# Patient Record
Sex: MALE | Race: WHITE | Employment: FULL TIME | URBAN - METROPOLITAN AREA
[De-identification: names, ages, dates, MRNs, and addresses within clinical notes are randomized per-mention and may not be internally consistent; named-entity substitution may affect disease eponyms.]

---

## 2017-01-05 ENCOUNTER — HOSPITAL ENCOUNTER (EMERGENCY)
Age: 47
Discharge: HOME | End: 2017-01-05
Attending: EMERGENCY MEDICINE
Payer: COMMERCIAL

## 2017-01-05 VITALS
WEIGHT: 245.37 LBS | OXYGEN SATURATION: 99 % | SYSTOLIC BLOOD PRESSURE: 114 MMHG | TEMPERATURE: 97.5 F | DIASTOLIC BLOOD PRESSURE: 78 MMHG | RESPIRATION RATE: 16 BRPM | HEART RATE: 86 BPM | BODY MASS INDEX: 32.52 KG/M2 | HEIGHT: 73 IN

## 2017-01-05 DIAGNOSIS — K52.9 GASTROENTERITIS, ACUTE: Primary | ICD-10-CM

## 2017-01-05 LAB
ALBUMIN SERPL BCP-MCNC: 4 G/DL (ref 3.5–5)
ALBUMIN/GLOB SERPL: 1 {RATIO} (ref 1.1–2.2)
ALP SERPL-CCNC: 68 U/L (ref 45–117)
ALT SERPL-CCNC: 40 U/L (ref 12–78)
ANION GAP BLD CALC-SCNC: 9 MMOL/L (ref 5–15)
APPEARANCE UR: CLEAR
AST SERPL W P-5'-P-CCNC: 27 U/L (ref 15–37)
BACTERIA URNS QL MICRO: NEGATIVE /HPF
BASOPHILS # BLD AUTO: 0 K/UL (ref 0–0.1)
BASOPHILS # BLD: 0 % (ref 0–1)
BILIRUB SERPL-MCNC: 1.1 MG/DL (ref 0.2–1)
BILIRUB UR QL: NEGATIVE
BUN SERPL-MCNC: 16 MG/DL (ref 6–20)
BUN/CREAT SERPL: 14 (ref 12–20)
C DIFF TOX GENS STL QL NAA+PROBE: NEGATIVE
CALCIUM SERPL-MCNC: 8.6 MG/DL (ref 8.5–10.1)
CHLORIDE SERPL-SCNC: 108 MMOL/L (ref 97–108)
CO2 SERPL-SCNC: 22 MMOL/L (ref 21–32)
COLOR UR: ABNORMAL
CREAT SERPL-MCNC: 1.12 MG/DL (ref 0.7–1.3)
EOSINOPHIL # BLD: 0.3 K/UL (ref 0–0.4)
EOSINOPHIL NFR BLD: 4 % (ref 0–7)
EPITH CASTS URNS QL MICRO: ABNORMAL /LPF
ERYTHROCYTE [DISTWIDTH] IN BLOOD BY AUTOMATED COUNT: 14 % (ref 11.5–14.5)
GLOBULIN SER CALC-MCNC: 4 G/DL (ref 2–4)
GLUCOSE SERPL-MCNC: 133 MG/DL (ref 65–100)
GLUCOSE UR STRIP.AUTO-MCNC: >1000 MG/DL
HCT VFR BLD AUTO: 50.8 % (ref 36.6–50.3)
HGB BLD-MCNC: 17.2 G/DL (ref 12.1–17)
HGB UR QL STRIP: NEGATIVE
KETONES UR QL STRIP.AUTO: NEGATIVE MG/DL
LEUKOCYTE ESTERASE UR QL STRIP.AUTO: NEGATIVE
LYMPHOCYTES # BLD AUTO: 25 % (ref 12–49)
LYMPHOCYTES # BLD: 1.8 K/UL (ref 0.8–3.5)
MCH RBC QN AUTO: 28.6 PG (ref 26–34)
MCHC RBC AUTO-ENTMCNC: 33.9 G/DL (ref 30–36.5)
MCV RBC AUTO: 84.4 FL (ref 80–99)
MONOCYTES # BLD: 0.7 K/UL (ref 0–1)
MONOCYTES NFR BLD AUTO: 10 % (ref 5–13)
NEUTS SEG # BLD: 4.4 K/UL (ref 1.8–8)
NEUTS SEG NFR BLD AUTO: 61 % (ref 32–75)
NITRITE UR QL STRIP.AUTO: NEGATIVE
PH UR STRIP: 5.5 [PH] (ref 5–8)
PLATELET # BLD AUTO: 181 K/UL (ref 150–400)
POTASSIUM SERPL-SCNC: 4.5 MMOL/L (ref 3.5–5.1)
PROT SERPL-MCNC: 8 G/DL (ref 6.4–8.2)
PROT UR STRIP-MCNC: NEGATIVE MG/DL
RBC # BLD AUTO: 6.02 M/UL (ref 4.1–5.7)
RBC #/AREA URNS HPF: ABNORMAL /HPF (ref 0–5)
SODIUM SERPL-SCNC: 139 MMOL/L (ref 136–145)
SP GR UR REFRACTOMETRY: 1.02 (ref 1–1.03)
UA: UC IF INDICATED,UAUC: ABNORMAL
UROBILINOGEN UR QL STRIP.AUTO: 0.2 EU/DL (ref 0.2–1)
WBC # BLD AUTO: 7.2 K/UL (ref 4.1–11.1)
WBC #/AREA STL HPF: NORMAL /HPF (ref 0–4)
WBC URNS QL MICRO: ABNORMAL /HPF (ref 0–4)

## 2017-01-05 PROCEDURE — 85025 COMPLETE CBC W/AUTO DIFF WBC: CPT | Performed by: EMERGENCY MEDICINE

## 2017-01-05 PROCEDURE — 96360 HYDRATION IV INFUSION INIT: CPT

## 2017-01-05 PROCEDURE — 89055 LEUKOCYTE ASSESSMENT FECAL: CPT | Performed by: PHYSICIAN ASSISTANT

## 2017-01-05 PROCEDURE — 87493 C DIFF AMPLIFIED PROBE: CPT | Performed by: PHYSICIAN ASSISTANT

## 2017-01-05 PROCEDURE — 81001 URINALYSIS AUTO W/SCOPE: CPT | Performed by: EMERGENCY MEDICINE

## 2017-01-05 PROCEDURE — 80053 COMPREHEN METABOLIC PANEL: CPT | Performed by: EMERGENCY MEDICINE

## 2017-01-05 PROCEDURE — 74011250636 HC RX REV CODE- 250/636: Performed by: PHYSICIAN ASSISTANT

## 2017-01-05 PROCEDURE — 99283 EMERGENCY DEPT VISIT LOW MDM: CPT

## 2017-01-05 PROCEDURE — 87427 SHIGA-LIKE TOXIN AG IA: CPT | Performed by: PHYSICIAN ASSISTANT

## 2017-01-05 PROCEDURE — 96361 HYDRATE IV INFUSION ADD-ON: CPT

## 2017-01-05 PROCEDURE — 36415 COLL VENOUS BLD VENIPUNCTURE: CPT | Performed by: EMERGENCY MEDICINE

## 2017-01-05 RX ORDER — METFORMIN HYDROCHLORIDE EXTENDED-RELEASE TABLETS 1000 MG/1
2000 TABLET, FILM COATED, EXTENDED RELEASE ORAL DAILY
COMMUNITY

## 2017-01-05 RX ORDER — DIPHENOXYLATE HYDROCHLORIDE AND ATROPINE SULFATE 2.5; .025 MG/1; MG/1
1 TABLET ORAL
Qty: 20 TAB | Refills: 0 | Status: SHIPPED | OUTPATIENT
Start: 2017-01-05 | End: 2017-10-26

## 2017-01-05 RX ORDER — LISINOPRIL 20 MG/1
20 TABLET ORAL DAILY
COMMUNITY
End: 2017-11-14 | Stop reason: SINTOL

## 2017-01-05 RX ORDER — ONDANSETRON 4 MG/1
4 TABLET, FILM COATED ORAL
Qty: 20 TAB | Refills: 0 | Status: SHIPPED | OUTPATIENT
Start: 2017-01-05 | End: 2017-10-26

## 2017-01-05 RX ORDER — SODIUM CHLORIDE 0.9 % (FLUSH) 0.9 %
5-10 SYRINGE (ML) INJECTION EVERY 8 HOURS
Status: DISCONTINUED | OUTPATIENT
Start: 2017-01-05 | End: 2017-01-05 | Stop reason: HOSPADM

## 2017-01-05 RX ORDER — SODIUM CHLORIDE 0.9 % (FLUSH) 0.9 %
5-10 SYRINGE (ML) INJECTION AS NEEDED
Status: DISCONTINUED | OUTPATIENT
Start: 2017-01-05 | End: 2017-01-05 | Stop reason: HOSPADM

## 2017-01-05 RX ORDER — ERGOCALCIFEROL 1.25 MG/1
50000 CAPSULE ORAL
COMMUNITY
End: 2018-09-04 | Stop reason: ALTCHOICE

## 2017-01-05 RX ORDER — ATORVASTATIN CALCIUM 40 MG/1
40 TABLET, FILM COATED ORAL DAILY
COMMUNITY

## 2017-01-05 RX ORDER — PANTOPRAZOLE SODIUM 40 MG/1
40 TABLET, DELAYED RELEASE ORAL DAILY
COMMUNITY
End: 2018-09-04

## 2017-01-05 RX ADMIN — SODIUM CHLORIDE 1000 ML: 900 INJECTION, SOLUTION INTRAVENOUS at 10:40

## 2017-01-05 RX ADMIN — SODIUM CHLORIDE 1000 ML: 900 INJECTION, SOLUTION INTRAVENOUS at 12:29

## 2017-01-05 NOTE — ED NOTES
RICCI Pagan reviewed discharge instructions with the patient. The patient verbalized understanding. Patient discharged home in stable condition, ambulatory with family. Patient discharged with discharge papers and prescriptions in hand. Patient awake and alert, stable gait.

## 2017-01-05 NOTE — LETTER
[address] 
[facility] EMERGENCY DEPT 
[address] [place] [address] [address] [id number] [phone] Work/School Note Date: 1/5/2017 To Whom It May concern: 
 
Yamileth Ignacio was seen and treated today in the emergency room by the following provider(s): 
Attending Provider: Dennis Werner MD 
Physician Assistant: RICCI Cervantes. Please excuse Yamileth Ignacio from work for the next 2 days. Sincerely, Lino Cervantes

## 2017-01-05 NOTE — DISCHARGE INSTRUCTIONS
Gastroenteritis: Care Instructions  Your Care Instructions  Gastroenteritis is an illness that may cause nausea, vomiting, and diarrhea. It is sometimes called \"stomach flu. \" It can be caused by bacteria or a virus. You will probably begin to feel better in 1 to 2 days. In the meantime, get plenty of rest and make sure you do not become dehydrated. Dehydration occurs when your body loses too much fluid. Follow-up care is a key part of your treatment and safety. Be sure to make and go to all appointments, and call your doctor if you are having problems. Its also a good idea to know your test results and keep a list of the medicines you take. How can you care for yourself at home? · If your doctor prescribed antibiotics, take them as directed. Do not stop taking them just because you feel better. You need to take the full course of antibiotics. · Drink plenty of fluids to prevent dehydration, enough so that your urine is light yellow or clear like water. Choose water and other caffeine-free clear liquids until you feel better. If you have kidney, heart, or liver disease and have to limit fluids, talk with your doctor before you increase your fluid intake. · Drink fluids slowly, in frequent, small amounts, because drinking too much too fast can cause vomiting. · Begin eating mild foods, such as dry toast, yogurt, applesauce, bananas, and rice. Avoid spicy, hot, or high-fat foods, and do not drink alcohol or caffeine for a day or two. Do not drink milk or eat ice cream until you are feeling better. How to prevent gastroenteritis  · Keep hot foods hot and cold foods cold. · Do not eat meats, dressings, salads, or other foods that have been kept at room temperature for more than 2 hours. · Use a thermometer to check your refrigerator. It should be between 34°F and 40°F.  · Defrost meats in the refrigerator or microwave, not on the kitchen counter. · Keep your hands and your kitchen clean.  Wash your hands, cutting boards, and countertops with hot soapy water frequently. · Cook meat until it is well done. · Do not eat raw eggs or uncooked sauces made with raw eggs. · Do not take chances. If food looks or tastes spoiled, throw it out. When should you call for help? Call 911 anytime you think you may need emergency care. For example, call if:  · You vomit blood or what looks like coffee grounds. · You passed out (lost consciousness). · You pass maroon or very bloody stools. Call your doctor now or seek immediate medical care if:  · You have severe belly pain. · You have signs of needing more fluids. You have sunken eyes, a dry mouth, and pass only a little dark urine. · You feel like you are going to faint. · You have increased belly pain that does not go away in 1 to 2 days. · You have new or increased nausea, or you are vomiting. · You have a new or higher fever. · Your stools are black and tarlike or have streaks of blood. Watch closely for changes in your health, and be sure to contact your doctor if:  · You are dizzy or lightheaded. · You urinate less than usual, or your urine is dark yellow or brown. · You do not feel better with each day that goes by. Where can you learn more? Go to http://milind-joe.info/. Enter N142 in the search box to learn more about \"Gastroenteritis: Care Instructions. \"  Current as of: May 24, 2016  Content Version: 11.1  © 6632-6973 [employer]. Care instructions adapted under license by [employer] (which disclaims liability or warranty for this information). If you have questions about a medical condition or this instruction, always ask your healthcare professional. [address] any warranty or liability for your use of this information.

## 2017-01-05 NOTE — ED PROVIDER NOTES
The history is provided by the patient. No  was used. Harriet Leyva is a 48 y.o. male who presents ambulatory to [id number] ED, with PMH of DM and hypercholesterolemia,  c/o diarrhea ongoing x 4 days with x 10 episodes since 12 AM today. Patient has had sensation of weakness and dizziness. Patient experienced x 1 episode of vomiting on 1/2/17, none since or prior to that episode. Patient took immodium at onset without improvement. \"I have not felt bad\" and \"I feel weak and I feel dizzy\". There has been no nausea and no ABD pain. Patient simply continues to have to make trips to bathroom. Patient specifically denies fever/chills, URI s/s, cough, SOB, ABD tenderness, back pain, nausea, rashes, urinary s/s, recently drinking stream water or eating exotic/unusual foods. Since onset of s/s, patient continues to eat and taking PO fluids normally, and food \"seems to partially digest\" before it becomes diarrhea. Diarrhea is \"thin and watery\". Patient's s/s began after he returned home on 1/1/17 0300 from [place]. Patient ate no unusual foods/dishses; patient had two glasses of wine and ate only finger type foods/cheeses. No hx similar s/s. No recent ABX. Patient with no other specific c/o or concerns today. Pain is 0/10 scale. Going to bathroom frequently is \"annoying\". PCP: Che Nye MD   Allergies: dolobid      There are no other complaints, changes or physical findings at this time. Past Medical History:   Diagnosis Date    Diabetes ([address]     Hypercholesterolemia        Past Surgical History:   Procedure Laterality Date    Vascular surgery procedure unlist           History reviewed. No pertinent family history. Social History     Social History    Marital status:      Spouse name: N/A    Number of children: N/A    Years of education: N/A     Occupational History    Not on file.      Social History Main Topics    Smoking status: Former Smoker    Smokeless tobacco: Never Used    Alcohol use Yes      Comment: moderate    Drug use: Not on file    Sexual activity: Not on file     Other Topics Concern    Not on file     Social History Narrative         ALLERGIES: Dolobid [diflunisal]    Review of Systems   Constitutional: Negative for chills and fever. HENT: Negative for ear pain, rhinorrhea and sore throat. Respiratory: Negative for cough and shortness of breath. Gastrointestinal: Positive for diarrhea. Negative for abdominal pain and nausea. Genitourinary: Negative for dysuria and frequency. Musculoskeletal: Negative for back pain. Skin: Negative for rash. Neurological: Positive for dizziness and weakness. All other systems reviewed and are negative. Vitals:    01/05/17 1000 01/05/17 1312   BP: (!) 133/92 114/78   Pulse: 99 86   Resp: 16 16   Temp: 97.5 °F (36.4 °C)    SpO2: 99% 99%   Weight: 111.3 kg (245 lb 6 oz)    Height: 6' 1\" (1.854 m)             Physical Exam   Constitutional: He is oriented to person, place, and time. He appears well-developed and well-nourished. No distress. 47 yo  female   HENT:   Head: Normocephalic and atraumatic. Eyes: Conjunctivae are normal. Right eye exhibits no discharge. Left eye exhibits no discharge. Neck: Normal range of motion. Neck supple. Cardiovascular: Normal rate, regular rhythm and normal heart sounds. No murmur heard. Pulmonary/Chest: Effort normal and breath sounds normal. No respiratory distress. He has no wheezes. Abdominal: Soft. Normal appearance. He exhibits no distension. Bowel sounds are increased. There is no tenderness. There is no rebound and no guarding. Lymphadenopathy:     He has no cervical adenopathy. Neurological: He is alert and oriented to person, place, and time. Skin: Skin is warm and dry. He is not diaphoretic. Psychiatric: He has a normal mood and affect. His behavior is normal.   Nursing note and vitals reviewed.        MDM  Number of Diagnoses or Management Options  Diagnosis management comments: DDx: viral gastroenteritis, dehydration, food poisoning, electrolyte abnormality    Needs work note    ED Course       Procedures     12:08 PM  RICCI Pagan at bedside reviewing available test results and treatment /discharge plan, including a second liter of fluids prior to discharge today. Patient is feeling improved. Patient agrees and verbalizes understanding of plan. DISCHARGE NOTE:  1:18 PM  The care plan has been outline with the patient and/or family, who verbally conveyed understanding and agreement. Available results have been reviewed. Patient and/or family understand the follow up plan as outlined and discharge instructions. Should their condition deterioration at any time after discharge the patient agrees to return, follow up sooner than outlined or seek medical assistance at the closest Emergency Room as soon as possible. Questions have been answered. Discharge instructions and educational information regarding the patient's diagnosis as well a list of reasons why the patient would want to seek immediate medical attention, should their condition change, were reviewed directly with the patient/family     LABS COMPLETED AND REVIEWED:  Recent Results (from the past 12 hour(s))   CBC WITH AUTOMATED DIFF    Collection Time: 01/05/17 10:30 AM   Result Value Ref Range    WBC 7.2 4.1 - 11.1 K/uL    RBC 6.02 (H) 4.10 - 5.70 M/uL    HGB 17.2 (H) 12.1 - 17.0 g/dL    HCT 50.8 (H) 36.6 - 50.3 %    MCV 84.4 80.0 - 99.0 FL    MCH 28.6 26.0 - 34.0 PG    MCHC 33.9 30.0 - 36.5 g/dL    RDW 14.0 11.5 - 14.5 %    PLATELET 398 925 - 869 K/uL    NEUTROPHILS 61 32 - 75 %    LYMPHOCYTES 25 12 - 49 %    MONOCYTES 10 5 - 13 %    EOSINOPHILS 4 0 - 7 %    BASOPHILS 0 0 - 1 %    ABS. NEUTROPHILS 4.4 1.8 - 8.0 K/UL    ABS. LYMPHOCYTES 1.8 0.8 - 3.5 K/UL    ABS. MONOCYTES 0.7 0.0 - 1.0 K/UL    ABS. EOSINOPHILS 0.3 0.0 - 0.4 K/UL    ABS.  BASOPHILS 0.0 0.0 - 0.1 K/UL   METABOLIC PANEL, COMPREHENSIVE    Collection Time: 01/05/17 10:30 AM   Result Value Ref Range    Sodium 139 136 - 145 mmol/L    Potassium 4.5 3.5 - 5.1 mmol/L    Chloride 108 97 - 108 mmol/L    CO2 22 21 - 32 mmol/L    Anion gap 9 5 - 15 mmol/L    Glucose 133 (H) 65 - 100 mg/dL    BUN 16 6 - 20 MG/DL    Creatinine 1.12 0.70 - 1.30 MG/DL    BUN/Creatinine ratio 14 12 - 20      GFR est AA >60 >60 ml/min/1.73m2    GFR est non-AA >60 >60 ml/min/1.73m2    Calcium 8.6 8.5 - 10.1 MG/DL    Bilirubin, total 1.1 (H) 0.2 - 1.0 MG/DL    ALT 40 12 - 78 U/L    AST 27 15 - 37 U/L    Alk. phosphatase 68 45 - 117 U/L    Protein, total 8.0 6.4 - 8.2 g/dL    Albumin 4.0 3.5 - 5.0 g/dL    Globulin 4.0 2.0 - 4.0 g/dL    A-G Ratio 1.0 (L) 1.1 - 2.2     URINALYSIS W/ REFLEX CULTURE    Collection Time: 01/05/17 10:57 AM   Result Value Ref Range    Color YELLOW/STRAW      Appearance CLEAR CLEAR      Specific gravity 1.020 1.003 - 1.030      pH (UA) 5.5 5.0 - 8.0      Protein NEGATIVE  NEG mg/dL    Glucose >1000 (A) NEG mg/dL    Ketone NEGATIVE  NEG mg/dL    Bilirubin NEGATIVE  NEG      Blood NEGATIVE  NEG      Urobilinogen 0.2 0.2 - 1.0 EU/dL    Nitrites NEGATIVE  NEG      Leukocyte Esterase NEGATIVE  NEG      WBC 0-4 0 - 4 /hpf    RBC 0-5 0 - 5 /hpf    Epithelial cells FEW FEW /lpf    Bacteria NEGATIVE  NEG /hpf    UA:UC IF INDICATED CULTURE NOT INDICATED BY UA RESULT CNI           Medications   sodium chloride (NS) flush 5-10 mL (not administered)   sodium chloride (NS) flush 5-10 mL (not administered)   sodium chloride 0.9 % bolus infusion 1,000 mL (1,000 mL IntraVENous New Bag 1/5/17 1229)   sodium chloride 0.9 % bolus infusion 1,000 mL (1,000 mL IntraVENous New Bag 1/5/17 1040)       CLINICAL IMPRESSION:  1. Gastroenteritis, acute        Plan:  1. Garza diet until s/s resolve  2.  Push PO fluids  Current Discharge Medication List      START taking these medications    Details   diphenoxylate-atropine (LOMOTIL) 2.5-0.025 mg per tablet Take 1 Tab by mouth four (4) times daily as needed for Diarrhea. Max Daily Amount: 4 Tabs. Qty: 20 Tab, Refills: 0      ondansetron hcl (ZOFRAN, AS HYDROCHLORIDE,) 4 mg tablet Take 1 Tab by mouth every eight (8) hours as needed for Nausea. Qty: 20 Tab, Refills: 0         CONTINUE these medications which have NOT CHANGED    Details   metFORMIN ER 1,000 mg tr24 Take 2,000 mg by mouth. Liraglutide (VICTOZA 2-KIYA) 0.6 mg/0.1 mL (18 mg/3 mL) sub-q pen 1.8 mg by SubCUTAneous route. canagliflozin (INVOKANA) 300 mg tablet Take  by mouth Daily (before breakfast). lisinopril (PRINIVIL, ZESTRIL) 20 mg tablet Take 20 mg by mouth daily. atorvastatin (LIPITOR) 40 mg tablet Take 40 mg by mouth daily. ergocalciferol (VITAMIN D2) 50,000 unit capsule Take 50,000 Units by mouth. vit B Cmplx 3-FA-Vit C-Biotin (NEPHRO JENARO RX) 1- mg-mg-mcg tablet Take 1 Tab by mouth daily. pantoprazole (PROTONIX) 40 mg tablet Take 40 mg by mouth daily. TESTOSTERONE (AXIRON TD) by TransDERmal route. Follow-up Information     Follow up With Details Comments Valentin Pollard MD In 1 week  [address] [employer]  [phone]          Return to the closest emergency room or follow up sooner for any deterioration  Patient's condition is stable and patient is ready to go home    This note is prepared by Ana Maria Hartman, acting as Scribe for [employer] MENDY Christianson PA-C: The scribe's documentation has been prepared under my direction and personally reviewed by me in its entirety. I confirm that the note above accurately reflects all work, treatment, procedures, and medical decision making performed by me.    4:09 PM  I was personally available for consultation in the emergency department. I have reviewed the chart and agree with the documentation recorded by the [facility], including the assessment, treatment plan, and disposition.   Liz Valenzuela MD

## 2017-01-05 NOTE — ED NOTES
Patient arrived with c/o diarrhea x4 days. Pt still able to eat and drink but reported \"everything goes right through me. \"  Pt denies abdominal pain. Pt reported he is dehydrated. Pt states \"I feel good, I just want to know why I have the diarrhea. \"  Kandis Syed with minimal relief. Pt A&Ox4.

## 2017-01-05 NOTE — LETTER
[facility] EMERGENCY DEPT 
[address] [place] [address] [address] [id number] [phone] Work/School Note Date: 1/5/2017 To Whom It May concern: 
 
Vianca Hart was seen and treated today in the emergency room by the following provider(s): 
Attending Provider: Robert Antony MD 
Physician Assistant: RICCI Dong. Please excuse Vianca Hart from work tomorrow. Sincerely, Lino Dong

## 2017-01-07 LAB
BACTERIA SPEC CULT: NORMAL
C JEJUNI+C COLI AG STL QL: NEGATIVE
E COLI SXT1+2 STL IA: NEGATIVE
SERVICE CMNT-IMP: NORMAL

## 2017-10-26 ENCOUNTER — HOSPITAL ENCOUNTER (EMERGENCY)
Age: 47
Discharge: HOME | End: 2017-10-26
Attending: FAMILY MEDICINE

## 2017-10-26 VITALS
SYSTOLIC BLOOD PRESSURE: 123 MMHG | HEART RATE: 100 BPM | OXYGEN SATURATION: 95 % | TEMPERATURE: 97 F | BODY MASS INDEX: 33.66 KG/M2 | RESPIRATION RATE: 16 BRPM | HEIGHT: 73 IN | DIASTOLIC BLOOD PRESSURE: 77 MMHG | WEIGHT: 254 LBS

## 2017-10-26 DIAGNOSIS — J20.8 ACUTE BRONCHITIS, VIRAL: Primary | ICD-10-CM

## 2017-10-26 RX ORDER — ALBUTEROL SULFATE 90 UG/1
2 AEROSOL, METERED RESPIRATORY (INHALATION)
Qty: 1 INHALER | Refills: 0 | Status: SHIPPED | OUTPATIENT
Start: 2017-10-26 | End: 2018-09-04

## 2017-10-26 RX ORDER — BENZONATATE 200 MG/1
200 CAPSULE ORAL
Qty: 21 CAP | Refills: 0 | Status: SHIPPED | OUTPATIENT
Start: 2017-10-26 | End: 2017-11-02

## 2017-10-26 RX ORDER — CODEINE PHOSPHATE AND GUAIFENESIN 10; 100 MG/5ML; MG/5ML
5 SOLUTION ORAL
Qty: 120 ML | Refills: 0 | Status: SHIPPED | OUTPATIENT
Start: 2017-10-26 | End: 2017-11-14 | Stop reason: ALTCHOICE

## 2017-10-26 RX ORDER — AZITHROMYCIN 250 MG/1
TABLET, FILM COATED ORAL
Qty: 6 TAB | Refills: 0 | Status: SHIPPED | OUTPATIENT
Start: 2017-10-26 | End: 2017-11-14

## 2017-10-26 RX ORDER — IPRATROPIUM BROMIDE AND ALBUTEROL SULFATE 2.5; .5 MG/3ML; MG/3ML
3 SOLUTION RESPIRATORY (INHALATION)
Qty: 30 NEBULE | Refills: 0 | Status: SHIPPED | OUTPATIENT
Start: 2017-10-26 | End: 2017-10-26

## 2017-10-26 NOTE — DISCHARGE INSTRUCTIONS
Bronchitis: Care Instructions  Your Care Instructions    Bronchitis is inflammation of the bronchial tubes, which carry air to the lungs. The tubes swell and produce mucus, or phlegm. The mucus and inflamed bronchial tubes make you cough. You may have trouble breathing. Most cases of bronchitis are caused by viruses like those that cause colds. Antibiotics usually do not help and they may be harmful. Bronchitis usually develops rapidly and lasts about 2 to 3 weeks in otherwise healthy people. Follow-up care is a key part of your treatment and safety. Be sure to make and go to all appointments, and call your doctor if you are having problems. It's also a good idea to know your test results and keep a list of the medicines you take. How can you care for yourself at home? · Take all medicines exactly as prescribed. Call your doctor if you think you are having a problem with your medicine. · Get some extra rest.  · Take an over-the-counter pain medicine, such as acetaminophen (Tylenol), ibuprofen (Advil, Motrin), or naproxen (Aleve) to reduce fever and relieve body aches. Read and follow all instructions on the label. · Do not take two or more pain medicines at the same time unless the doctor told you to. Many pain medicines have acetaminophen, which is Tylenol. Too much acetaminophen (Tylenol) can be harmful. · Take an over-the-counter cough medicine that contains dextromethorphan to help quiet a dry, hacking cough so that you can sleep. Avoid cough medicines that have more than one active ingredient. Read and follow all instructions on the label. · Breathe moist air from a humidifier, hot shower, or sink filled with hot water. The heat and moisture will thin mucus so you can cough it out. · Do not smoke. Smoking can make bronchitis worse. If you need help quitting, talk to your doctor about stop-smoking programs and medicines. These can increase your chances of quitting for good.   When should you call for help? Call 911 anytime you think you may need emergency care. For example, call if:  ? · You have severe trouble breathing. ?Call your doctor now or seek immediate medical care if:  ? · You have new or worse trouble breathing. ? · You cough up dark brown or bloody mucus (sputum). ? · You have a new or higher fever. ? · You have a new rash. ? Watch closely for changes in your health, and be sure to contact your doctor if:  ? · You cough more deeply or more often, especially if you notice more mucus or a change in the color of your mucus. ? · You are not getting better as expected. Where can you learn more? Go to http://milind-joe.info/. Enter H333 in the search box to learn more about \"Bronchitis: Care Instructions. \"  Current as of: May 12, 2017  Content Version: 11.4  © 4783-0862 [employer]. Care instructions adapted under license by [employer] (which disclaims liability or warranty for this information). If you have questions about a medical condition or this instruction, always ask your healthcare professional. [address] any warranty or liability for your use of this information.

## 2017-11-06 NOTE — UC PROVIDER NOTE
Patient is a 50 y.o. male presenting with cough. Cough   This is a new problem. The current episode started more than 1 week ago. The problem occurs every few minutes. The problem has not changed since onset. The cough is non-productive. There has been no fever. Associated symptoms include sore throat. Pertinent negatives include no chest pain, no chills, no ear pain, no headaches, no myalgias, no shortness of breath, no nausea and no vomiting. He has tried nothing for the symptoms. He is not a smoker. His past medical history is significant for bronchitis. His past medical history does not include pneumonia or asthma. Past Medical History:   Diagnosis Date    Diabetes ([address]     Hypercholesterolemia         Past Surgical History:   Procedure Laterality Date    VASCULAR SURGERY PROCEDURE UNLIST           History reviewed. No pertinent family history. Social History     Social History    Marital status:      Spouse name: N/A    Number of children: N/A    Years of education: N/A     Occupational History    Not on file. Social History Main Topics    Smoking status: Former Smoker    Smokeless tobacco: Never Used    Alcohol use Yes      Comment: moderate    Drug use: Not on file    Sexual activity: Not on file     Other Topics Concern    Not on file     Social History Narrative                ALLERGIES: Dolobid [diflunisal]    Review of Systems   Constitutional: Negative for chills. HENT: Positive for sore throat. Negative for ear pain. Respiratory: Positive for cough. Negative for shortness of breath. Cardiovascular: Negative for chest pain. Gastrointestinal: Negative for nausea and vomiting. Musculoskeletal: Negative for myalgias. Neurological: Negative for headaches. All other systems reviewed and are negative.       Vitals:    10/26/17 1320   BP: 123/77   Pulse: 100   Resp: 16   Temp: 97 °F (36.1 °C)   SpO2: 95%   Weight: 115.2 kg (254 lb)   Height: 6' 1\" (1.854 m) Physical Exam   Constitutional: No distress. HENT:   Right Ear: Tympanic membrane and ear canal normal.   Left Ear: Tympanic membrane and ear canal normal.   Nose: Nose normal.   Mouth/Throat: No oropharyngeal exudate, posterior oropharyngeal edema or posterior oropharyngeal erythema. Eyes: Conjunctivae are normal. Right eye exhibits no discharge. Left eye exhibits no discharge. Neck: Neck supple. Pulmonary/Chest: Effort normal and breath sounds normal. No respiratory distress. He has no wheezes. He has no rales. Lymphadenopathy:     He has no cervical adenopathy. Skin: No rash noted. Nursing note and vitals reviewed. MDM     Differential Diagnosis; Clinical Impression; Plan:     CLINICAL IMPRESSION:  Acute bronchitis, viral  (primary encounter diagnosis)      DDX    Plan:      Tessalon- robitussin AC suspension - flonase and albuterol     If not better in 1 week- use z myles   Amount and/or Complexity of Data Reviewed:    Review and summarize past medical records:  Yes  Risk of Significant Complications, Morbidity, and/or Mortality:   Presenting problems: Moderate  Management options:   Moderate  Progress:   Patient progress:  Stable      Procedures

## 2017-11-14 ENCOUNTER — OFFICE VISIT (OUTPATIENT)
Dept: FAMILY MEDICINE CLINIC | Age: 47
End: 2017-11-14

## 2017-11-14 VITALS
HEIGHT: 73 IN | SYSTOLIC BLOOD PRESSURE: 124 MMHG | OXYGEN SATURATION: 96 % | TEMPERATURE: 97.9 F | HEART RATE: 97 BPM | DIASTOLIC BLOOD PRESSURE: 75 MMHG | WEIGHT: 258 LBS | RESPIRATION RATE: 16 BRPM | BODY MASS INDEX: 34.19 KG/M2

## 2017-11-14 DIAGNOSIS — Z76.89 ENCOUNTER TO ESTABLISH CARE: ICD-10-CM

## 2017-11-14 DIAGNOSIS — R09.82 POST-NASAL DRIP: ICD-10-CM

## 2017-11-14 DIAGNOSIS — R05.9 COUGH: Primary | ICD-10-CM

## 2017-11-14 DIAGNOSIS — T46.4X5A ADVERSE EFFECT OF LISINOPRIL, INITIAL ENCOUNTER: ICD-10-CM

## 2017-11-14 DIAGNOSIS — R05.3 PERSISTENT DRY COUGH: ICD-10-CM

## 2017-11-14 DIAGNOSIS — Z79.4 CONTROLLED TYPE 2 DIABETES MELLITUS WITHOUT COMPLICATION, WITH LONG-TERM CURRENT USE OF INSULIN (HCC): ICD-10-CM

## 2017-11-14 DIAGNOSIS — E11.9 CONTROLLED TYPE 2 DIABETES MELLITUS WITHOUT COMPLICATION, WITH LONG-TERM CURRENT USE OF INSULIN (HCC): ICD-10-CM

## 2017-11-14 RX ORDER — BENZONATATE 100 MG/1
100 CAPSULE ORAL
Qty: 21 CAP | Refills: 0 | Status: SHIPPED | OUTPATIENT
Start: 2017-11-14 | End: 2017-11-21

## 2017-11-14 RX ORDER — NAPROXEN 250 MG/1
TABLET ORAL 2 TIMES DAILY WITH MEALS
COMMUNITY
End: 2018-11-28

## 2017-11-14 RX ORDER — CODEINE PHOSPHATE AND GUAIFENESIN 10; 100 MG/5ML; MG/5ML
5 SOLUTION ORAL
Qty: 120 ML | Refills: 0 | Status: SHIPPED | OUTPATIENT
Start: 2017-11-14 | End: 2018-09-04

## 2017-11-14 NOTE — PROGRESS NOTES
Chief Complaint   Patient presents with    Cough     been going on for 1 month     Headache    Nasal Congestion     in the chest      1. Have you been to the ER, urgent care clinic since your last visit? Hospitalized since your last visit?n/a hasn't been to office in 3 years     2. Have you seen or consulted any other health care providers outside of the [address] [place] [address] since your last visit? Include any pap smears or colon screening. Patient sees an endocrinologist. Dr. Torsten Pérez- Internal      Pt sees Dr. Maurice Johnson for DM     Pt states that prednisone \"spikes his blood sugar\"     Pt states he went to [facility] around a month ago.

## 2017-11-14 NOTE — PROGRESS NOTES
Subjective:     Chief Complaint   Patient presents with    Cough     been going on for 1 month     Headache    Nasal Congestion     in the chest        Delroy Suarez is a 50 y.o. male who complains of post nasal drip, dry cough (occasionally productive) and headache for 3-4 weeks, not improving. Cough wakes up at night and now feeling like he has pulled chest muscles from coughing so hard. Cough is worse at night and AM, \"annoying during the day\"  Was treat with azithromycin, tessalon, albuterol inhaler and robitussin with codeine at  on 10/26/17. Albuterol inhaler \"not doing anything\". Says that the tessalon and cough syrup helped some but he cannot take cough syrup and work. He denies fevers. He denies any mucous or sinus pain. Not taking over the counter medications but is using Neti Pot 1-2 times per day. Patient does not smoke cigarettes (quit 15 years ago)  Cannot tolerate steroids due to DM. Says that any prednisone causes his blood sugars to \"spike\"      New patient to me, Dr Maynor Epperson last PCP, seen by Dr Alayna Carrington prior. Has history of DM2, hypogonadism, hyperlipidemia. Has labs done routinely at endocrine, last done in August 25, 2017 and HgbA1c was 6.5  Says that his DM is stable on current medications. On protonix for about 3 years for GERD symptoms. Feels that his symptoms are controlled, was never told to try to wean off or use Zantac instead. Denies cardiac complaints including chest pain or discomfort, elevated heart rate, or palpitations. Denies respiratory complaints including SOB, difficulty or pain with breathing, wheezes, and cough. Denies fever, myalgias, chills, weakness, fatigue and other systemic symptoms. ROS is otherwise negative. No recent travel. Chart reviewed: immunizations are up to date and documented.     Past Medical History:   Diagnosis Date    Diabetes ([address]     Hypercholesterolemia     Sleep apnea 2015     Social History   Substance Use Topics    Smoking status: Former Smoker    Smokeless tobacco: Never Used    Alcohol use Yes      Comment: social      Current Outpatient Prescriptions on File Prior to Visit   Medication Sig Dispense Refill   [facility]. REC. ANLOG (TOUJEO SOLOSTAR SC) by SubCUTAneous route.  albuterol (PROVENTIL HFA, VENTOLIN HFA, PROAIR HFA) 90 mcg/actuation inhaler Take 2 Puffs by inhalation every six (6) hours as needed for Wheezing. 1 Inhaler 0    metFORMIN ER 1,000 mg tr24 Take 2,000 mg by mouth.  Liraglutide (VICTOZA 2-KIYA) 0.6 mg/0.1 mL (18 mg/3 mL) sub-q pen 1.8 mg by SubCUTAneous route.  lisinopril (PRINIVIL, ZESTRIL) 20 mg tablet Take 20 mg by mouth daily.  atorvastatin (LIPITOR) 40 mg tablet Take 40 mg by mouth daily.  ergocalciferol (VITAMIN D2) 50,000 unit capsule Take 50,000 Units by mouth.  vit B Cmplx 3-FA-Vit C-Biotin (NEPHRO JENARO RX) 1- mg-mg-mcg tablet Take 1 Tab by mouth daily.  pantoprazole (PROTONIX) 40 mg tablet Take 40 mg by mouth daily.  TESTOSTERONE (AXIRON TD) by TransDERmal route.  azithromycin (ZITHROMAX) 250 mg tablet Take two tablets today then one tablet daily 6 Tab 0    guaiFENesin-codeine (ROBITUSSIN AC) 100-10 mg/5 mL solution Take 5 mL by mouth three (3) times daily as needed for Cough. Max Daily Amount: 15 mL. 120 mL 0     No current facility-administered medications on file prior to visit. Allergies   Allergen Reactions    Dolobid [Diflunisal] Rash        Review of Systems  Pertinent items are noted in HPI. Agree with nurses note. OBJECTIVE:   Visit Vitals    /75 (BP 1 Location: Left arm, BP Patient Position: Sitting)    Pulse 97    Temp 97.9 °F (36.6 °C) (Oral)    Resp 16    Ht 6' 1\" (1.854 m)    Wt 258 lb (117 kg)    SpO2 96%    BMI 34.04 kg/m2     He appears well, vital signs are as noted above   PAIN: No complaints of pain today. HEAD:  Normocephalic. Atraumatic.   Non tender sinuses x 4.  EYE: PERRLA. EOMs intact. Sclera anicteric without injection. No drainage or discharge. EARS: Hearing intact bilaterally. External ear canals normal without evidence of blood or swelling. Bilateral TM's intact, pearly grey with landmarks visible. No erythema or effusion. + clear fluid bubbles seen bilateral   NOSE: Patent. Nasal turbinates boggy. No polyps noted. Mild erythema. No discharge. MOUTH: mucous membranes pink and moist. Posterior pharynx normal with cobblestone appearance. No erythema, white exudate or obstruction. NECK: supple. Midline trachea. RESP: Breath sounds are symmetrical bilaterally. Unlabored without SOB. Speaking in full sentences. Clear to auscultation bilaterally anteriorly and posteriorly. No wheezes. No rales or rhonchi. Non-productive, dry  cough heard. CV: normal rate. Regular rhythm. S1, S2 audible. No murmur noted. No rubs, clicks or gallops noted. HEME/LYMPH: peripheral pulses palpable 2+ x 4 extremities. No peripheral edema is noted. No cervical adenopathy noted. SKIN: clean dry and intact throughout. no rashes, erythema, ecchymosis, lacerations, abrasions, suspicious moles noted           Assessment/Plan:   Differential diagnosis and treatment options reviewed with patient who is in agreement with treatment plan as outlined below. ICD-10-CM ICD-9-CM    1. Cough R05 786.2 XR CHEST PA LAT      benzonatate (TESSALON) 100 mg capsule      guaiFENesin-codeine (ROBITUSSIN AC) 100-10 mg/5 mL solution   2. Persistent dry cough R05 786.2 XR CHEST PA LAT   3. Adverse effect of lisinopril, initial encounter T46.4X5A E942.6    4. Controlled type 2 diabetes mellitus without complication, with long-term current use of insulin (HCC) E11.9 250.00     Z79.4 V58.67    5. Encounter to establish care Z76.89 V65.8    6.  Post-nasal drip R09.82 784.91        Will send for CXR to rule out any other lung abnormality since cough persists for about a month and no improvement after antibiotic. Potential lisinopril induced cough although he has been on lisinopril for 1.5 year or more. Will Try d/c lisinopril- he says he has never had HTN but was on the lisinopril for renal protection while taking Invokana for his diabetes but has since stopped taking invokana due to black box warnings but stayed on the lisinopril. Encouraged him to monitor BP for the next few days, may need to add different antihypertensive if BP becomes elevated. Discussed BP goal <140/90. Encouraged to start OTC allergy medications (Flonase and zyrtec). Symptomatic therapy suggested: push fluids, gargle warm salt water and apply heat to sinuses prn. Lack of antibiotic effectiveness discussed with him. Alternate Ibuprofen with Tylenol every 4 hours as needed for pain and discomfort. OTC nasal saline spray  2-3 sprays per nostril twice a day to clear eustachian tube and assist with post nasal drip symptoms. OTC Mucinex (plain) for 3-5 days, increase water. Encouraged nutrition, hydration and rest; -avoid dairy, sugar and strenuous activity    Verbal and written instructions (see AVS) provided. Patient expresses understanding and agreement of diagnosis and treatment plan. Will try to get old records from PCP and endocrine for recent labs. F/u if symptoms do not improve or worsen. Follow up in 2 weeks for BP check.

## 2017-11-14 NOTE — PATIENT INSTRUCTIONS

## 2017-11-14 NOTE — MR AVS SNAPSHOT
Visit Information Date & Time Provider Department Dept. Phone Encounter #  
 11/14/2017  7:45 AM Karen Thao NP Fatou Wang [facility] [address] [phone] [id number] Follow-up Instructions Return in about 2 weeks (around 11/28/2017), or if symptoms worsen or fail to improve. Upcoming Health Maintenance Date Due  
 LIPID PANEL Q1 1968 FOOT EXAM Q1 4/21/1978 MICROALBUMIN Q1 4/21/1978 EYE EXAM RETINAL OR DILATED Q1 4/21/1978 Pneumococcal 19-64 Medium Risk (1 of 1 - PPSV23) 4/21/1987 DTaP/Tdap/Td series (1 - Tdap) 4/21/1989 HEMOGLOBIN A1C Q6M 7/14/2014 Influenza Age 5 to Adult 8/1/2017 Allergies as of 11/14/2017  Review Complete On: 11/14/2017 By: Karen Thao NP Severity Noted Reaction Type Reactions Dolobid [Diflunisal]  09/03/2013   Side Effect Rash Current Immunizations  Reviewed on 12/3/2013 Name Date Influenza Vaccine 12/3/2013 Not reviewed this visit You Were Diagnosed With   
  
 Codes Comments Cough    -  Primary ICD-10-CM: N62 ICD-9-CM: 786.2 Persistent dry cough     ICD-10-CM: R05 ICD-9-CM: 786.2 Adverse effect of lisinopril, initial encounter     ICD-10-CM: T46.4X5A 
ICD-9-CM: E942.6 Controlled type 2 diabetes mellitus without complication, with long-term current use of insulin (HCC)     ICD-10-CM: E11.9, Z79.4 ICD-9-CM: 250.00, V58.67 Encounter to establish care     ICD-10-CM: Z76.89 
ICD-9-CM: V65.8 Vitals BP Pulse Temp Resp Height(growth percentile) Weight(growth percentile) 124/75 (BP 1 Location: Left arm, BP Patient Position: Sitting) 97 97.9 °F (36.6 °C) (Oral) 16 6' 1\" (1.854 m) 258 lb (117 kg) SpO2 BMI Smoking Status 96% 34.04 kg/m2 Former Smoker Vitals History BMI and BSA Data Body Mass Index Body Surface Area 34.04 kg/m 2 2.45 m 2 Preferred Pharmacy Pharmacy Name Phone [address] [phone] Your Updated Medication List  
  
   
This list is accurate as of: 11/14/17  8:35 AM.  Always use your most recent med list.  
  
  
  
  
 albuterol 90 mcg/actuation inhaler Commonly known as:  PROVENTIL HFA, VENTOLIN HFA, PROAIR HFA Take 2 Puffs by inhalation every six (6) hours as needed for Wheezing. atorvastatin 40 mg tablet Commonly known as:  LIPITOR Take 40 mg by mouth daily. AXIRON TD  
by TransDERmal route. metFORMIN ER 1,000 mg Tr24 Take 2,000 mg by mouth. naproxen 250 mg tablet Commonly known as:  NAPROSYN Take  by mouth two (2) times daily (with meals). PROTONIX 40 mg tablet Generic drug:  pantoprazole Take 40 mg by mouth daily. TOUJEO SOLOSTAR SC  
by SubCUTAneous route. VICTOZA 2-KIYA 0.6 mg/0.1 mL (18 mg/3 mL) Pnij Generic drug:  Liraglutide 1.8 mg by SubCUTAneous route. vit B Cmplx 3-FA-Vit C-Biotin 1- mg-mg-mcg tablet Commonly known as:  NEPHRO JENARO RX Take 1 Tab by mouth daily. VITAMIN D2 50,000 unit capsule Generic drug:  ergocalciferol Take 50,000 Units by mouth. Follow-up Instructions Return in about 2 weeks (around 11/28/2017), or if symptoms worsen or fail to improve. To-Do List   
 02/12/2018 Imaging:  XR CHEST PA LAT Patient Instructions Cough: Care Instructions Your Care Instructions A cough is your body's response to something that bothers your throat or airways. Many things can cause a cough. You might cough because of a cold or the flu, bronchitis, or asthma. Smoking, postnasal drip, allergies, and stomach acid that backs up into your throat also can cause coughs. A cough is a symptom, not a disease. Most coughs stop when the cause, such as a cold, goes away. You can take a few steps at home to cough less and feel better. Follow-up care is a key part of your treatment and safety. Be sure to make and go to all appointments, and call your doctor if you are having problems. It's also a good idea to know your test results and keep a list of the medicines you take. How can you care for yourself at home? · Drink lots of water and other fluids. This helps thin the mucus and soothes a dry or sore throat. Honey or lemon juice in hot water or tea may ease a dry cough. · Take cough medicine as directed by your doctor. · Prop up your head on pillows to help you breathe and ease a dry cough. · Try cough drops to soothe a dry or sore throat. Cough drops don't stop a cough. Medicine-flavored cough drops are no better than candy-flavored drops or hard candy. · Do not smoke. Avoid secondhand smoke. If you need help quitting, talk to your doctor about stop-smoking programs and medicines. These can increase your chances of quitting for good. When should you call for help? Call 911 anytime you think you may need emergency care. For example, call if: 
? · You have severe trouble breathing. ?Call your doctor now or seek immediate medical care if: 
? · You cough up blood. ? · You have new or worse trouble breathing. ? · You have a new or higher fever. ? · You have a new rash. ? Watch closely for changes in your health, and be sure to contact your doctor if: 
? · You cough more deeply or more often, especially if you notice more mucus or a change in the color of your mucus. ? · You have new symptoms, such as a sore throat, an earache, or sinus pain. ? · You do not get better as expected. Where can you learn more? Go to http://milind-joe.info/. Enter D279 in the search box to learn more about \"Cough: Care Instructions. \" Current as of: May 12, 2017 Content Version: 11.4 © 5009-0245 Healthwise, [employer].  Care instructions adapted under license by [employer] (which disclaims liability or warranty for this information). If you have questions about a medical condition or this instruction, always ask your healthcare professional. [address] any warranty or liability for your use of this information. Introducing hospitals HEALTH SERVICES! [facility] introduces FlowPay patient portal. Now you can access parts of your medical record, email your doctor's office, and request medication refills online. 1. In your internet browser, go to [email or web address] 2. Click on the First Time User? Click Here link in the Sign In box. You will see the New Member Sign Up page. 3. Enter your FlowPay Access Code exactly as it appears below. You will not need to use this code after youve completed the sign-up process. If you do not sign up before the expiration date, you must request a new code. · FlowPay Access Code: [email or web address] Expires: 1/24/2018 12:36 PM 
 
4. Enter the last four digits of your Social Security Number (xxxx) and Date of Birth (mm/dd/yyyy) as indicated and click Submit. You will be taken to the next sign-up page. 5. Create a FlowPay ID. This will be your FlowPay login ID and cannot be changed, so think of one that is secure and easy to remember. 6. Create a FlowPay password. You can change your password at any time. 7. Enter your Password Reset Question and Answer. This can be used at a later time if you forget your password. 8. Enter your e-mail address. You will receive e-mail notification when new information is available in [address] 9. Click Sign Up. You can now view and download portions of your medical record. 10. Click the Download Summary menu link to download a portable copy of your medical information. If you have questions, please visit the Frequently Asked Questions section of the FlowPay website. Remember, FlowPay is NOT to be used for urgent needs. For medical emergencies, dial 911. Now available from your iPhone and Android! Please provide this summary of care documentation to your next provider. Your primary care clinician is listed as NELLY CASTILLO. If you have any questions after today's visit, please call [phone].

## 2017-12-06 ENCOUNTER — OFFICE VISIT (OUTPATIENT)
Dept: FAMILY MEDICINE CLINIC | Age: 47
End: 2017-12-06

## 2017-12-06 VITALS
WEIGHT: 261.2 LBS | SYSTOLIC BLOOD PRESSURE: 130 MMHG | RESPIRATION RATE: 20 BRPM | HEART RATE: 98 BPM | DIASTOLIC BLOOD PRESSURE: 87 MMHG | TEMPERATURE: 97.9 F | BODY MASS INDEX: 34.62 KG/M2 | OXYGEN SATURATION: 95 % | HEIGHT: 73 IN

## 2017-12-06 DIAGNOSIS — R05.8 DRY COUGH: Primary | ICD-10-CM

## 2017-12-06 NOTE — PROGRESS NOTES
Chief Complaint   Patient presents with    Cough     3 week follow up      1. Have you been to the ER, urgent care clinic since your last visit? Hospitalized since your last visit? No    2. Have you seen or consulted any other health care providers outside of the [address] [place] [address] since your last visit? Include any pap smears or colon screening. No     Pt still has cough from last visit.

## 2017-12-06 NOTE — MR AVS SNAPSHOT
Visit Information Date & Time Provider Department Dept. Phone Encounter #  
 12/6/2017  8:00 AM Cindy Torres, [address] [address] [phone] [id number] Upcoming Health Maintenance Date Due  
 LIPID PANEL Q1 1968 FOOT EXAM Q1 4/21/1978 MICROALBUMIN Q1 4/21/1978 EYE EXAM RETINAL OR DILATED Q1 4/21/1978 Pneumococcal 19-64 Medium Risk (1 of 1 - PPSV23) 4/21/1987 DTaP/Tdap/Td series (1 - Tdap) 4/21/1989 HEMOGLOBIN A1C Q6M 7/14/2014 Allergies as of 12/6/2017  Review Complete On: 12/6/2017 By: Cindy Torres NP Severity Noted Reaction Type Reactions Dolobid [Diflunisal]  09/03/2013   Side Effect Rash Current Immunizations  Reviewed on 12/3/2013 Name Date Influenza Vaccine 12/3/2013 Not reviewed this visit You Were Diagnosed With   
  
 Codes Comments Dry cough    -  Primary ICD-10-CM: U62 ICD-9-CM: 867. 2 Vitals BP Pulse Temp Resp Height(growth percentile) Weight(growth percentile) 130/87 (BP 1 Location: Left arm, BP Patient Position: Sitting) 98 97.9 °F (36.6 °C) (Oral) 20 6' 1\" (1.854 m) 261 lb 3.2 oz (118.5 kg) SpO2 BMI Smoking Status 95% 34.46 kg/m2 Former Smoker Vitals History BMI and BSA Data Body Mass Index Body Surface Area 34.46 kg/m 2 2.47 m 2 Preferred Pharmacy Pharmacy Name Phone [facility], [address] [phone] Your Updated Medication List  
  
   
This list is accurate as of: 12/6/17  8:50 AM.  Always use your most recent med list.  
  
  
  
  
 albuterol 90 mcg/actuation inhaler Commonly known as:  PROVENTIL HFA, VENTOLIN HFA, PROAIR HFA Take 2 Puffs by inhalation every six (6) hours as needed for Wheezing. atorvastatin 40 mg tablet Commonly known as:  LIPITOR Take 40 mg by mouth daily. AXIRON TD  
by TransDERmal route. guaiFENesin-codeine 100-10 mg/5 mL solution Commonly known as:  ROBITUSSIN AC Take 5 mL by mouth three (3) times daily as needed for Cough. Max Daily Amount: 15 mL. metFORMIN ER 1,000 mg Tr24 Take 2,000 mg by mouth. naproxen 250 mg tablet Commonly known as:  NAPROSYN Take  by mouth two (2) times daily (with meals). PROTONIX 40 mg tablet Generic drug:  pantoprazole Take 40 mg by mouth daily. TOUJEO SOLOSTAR SC  
by SubCUTAneous route. VICTOZA 2-KIYA 0.6 mg/0.1 mL (18 mg/3 mL) Pnij Generic drug:  Liraglutide 1.8 mg by SubCUTAneous route. vit B Cmplx 3-FA-Vit C-Biotin 1- mg-mg-mcg tablet Commonly known as:  NEPHRO JENARO RX Take 1 Tab by mouth daily. VITAMIN D2 50,000 unit capsule Generic drug:  ergocalciferol Take 50,000 Units by mouth. We Performed the Following REFERRAL TO ENT-OTOLARYNGOLOGY [ZCI54 Custom] Comments:  
 Persistent cough (stemming from throat), failed antihistamine Referral Information Referral ID Referred By Referred To  
  
 [id number] Yoseph Matos, [address] [facility] Whitney Ram [address] [place] [address] Fax: [phone] Visits Status Start Date End Date 1 New Request 12/6/17 12/6/18 If your referral has a status of pending review or denied, additional information will be sent to support the outcome of this decision. Patient Instructions Need CXR,- oredered Wean off Protonix-  Start taking half tab daily for 2-3 weeks then every other day for one week. Add Zantac or Pepcid in daily while weaning. Also advised to use over the counter nasal saline 2 sprays each nostril 2-3 times per day to assist with eustachian tube draining. Chronic Cough: Care Instructions Your Care Instructions A cough is your body's response to something that bothers your throat or airways. Many things can cause a cough.  You might cough because of a cold or the flu, bronchitis, or asthma. Smoking, postnasal drip, allergies, and stomach acid that backs up into your throat also can cause a cough. A cough can be short-term (acute) or long-term (chronic). A chronic cough lasts more than 3 weeks. A chronic cough is often caused by a long-term problem, such as asthma. Another cause might be a medicine, such as an ACE inhibitor. A cough is a symptom, not a disease. To treat a chronic cough, you may need to treat the problem that causes it. You can take a few steps at home to cough less and feel better. Some people cough or clear their throat out of habit for no clear reason. Follow-up care is a key part of your treatment and safety. Be sure to make and go to all appointments, and call your doctor if you are having problems. It's also a good idea to know your test results and keep a list of the medicines you take. How can you care for yourself at home? · Drink plenty of water and other fluids. This may help soothe a dry or sore throat. Honey or lemon juice in hot water or tea may ease a dry cough. · Prop up your head on pillows to help you breathe and ease a cough. · Do not smoke or allow others to smoke around you. Smoke can make a cough worse. If you need help quitting, talk to your doctor about stop-smoking programs and medicines. These can increase your chances of quitting for good. · Avoid exposure to smoke, dust, or other pollutants, or wear a face mask. Check with your doctor or pharmacist to find out which type of face mask will give you the most benefit. · Take cough medicine as directed by your doctor. · Try cough drops to soothe a dry or sore throat. Cough drops don't stop a cough. Medicine-flavored cough drops are no better than candy-flavored drops or hard candy. Throat clearing When you have a chronic cough or a disease that may cause this type of cough, you may often feel like you want to clear your throat.  This helps bring up mucus. But throat clearing does not always have a cause. Throat clearing can become a habit. The more you do it, the more you feel like you need to do it. But frequent throat clearing can be hard on your vocal cords. It's like slamming them together. To help lessen throat clearing, you can try: · Taking small sips of water. · Not clearing your throat when you feel you need to. · Swallowing hard when you want to clear your throat. You may want to ask your doctor if a medicine that thins mucus would help. When should you call for help? Call 911 anytime you think you may need emergency care. For example, call if: 
? · You have severe trouble breathing. ?Call your doctor now or seek immediate medical care if: 
? · You cough up blood. ? · You have new or worse trouble breathing. ? · You have a new or higher fever. ? Watch closely for changes in your health, and be sure to contact your doctor if: 
? · You cough more deeply or more often, especially if you notice more mucus or a change in the color of your mucus. ? · You do not get better as expected. Where can you learn more? Go to http://milind-joe.info/. Enter I688 in the search box to learn more about \"Chronic Cough: Care Instructions. \" Current as of: May 12, 2017 Content Version: 11.4 © 1049-5851 [employer]. Care instructions adapted under license by [employer] (which disclaims liability or warranty for this information). If you have questions about a medical condition or this instruction, always ask your healthcare professional. [address] [address] any warranty or liability for your use of this information. Learning About Diabetes Food Guidelines Your Care Instructions Meal planning is important to manage diabetes. It helps keep your blood sugar at a target level (which you set with your doctor).  You don't have to eat special foods. You can eat what your family eats, including sweets once in a while. But you do have to pay attention to how often you eat and how much you eat of certain foods. You may want to work with a dietitian or a certified diabetes educator (CDE) to help you plan meals and snacks. A dietitian or CDE can also help you lose weight if that is one of your goals. What should you know about eating carbs? Managing the amount of carbohydrate (carbs) you eat is an important part of healthy meals when you have diabetes. Carbohydrate is found in many foods. · Learn which foods have carbs. And learn the amounts of carbs in different foods. ¨ Bread, cereal, pasta, and rice have about 15 grams of carbs in a serving. A serving is 1 slice of bread (1 ounce), ½ cup of cooked cereal, or 1/3 cup of cooked pasta or rice. ¨ Fruits have 15 grams of carbs in a serving. A serving is 1 small fresh fruit, such as an apple or orange; ½ of a banana; ½ cup of cooked or canned fruit; ½ cup of fruit juice; 1 cup of melon or raspberries; or 2 tablespoons of dried fruit. ¨ Milk and no-sugar-added yogurt have 15 grams of carbs in a serving. A serving is 1 cup of milk or 2/3 cup of no-sugar-added yogurt. ¨ Starchy vegetables have 15 grams of carbs in a serving. A serving is ½ cup of mashed potatoes or sweet potato; 1 cup winter squash; ½ of a small baked potato; ½ cup of cooked beans; or ½ cup cooked corn or green peas. · Learn how much carbs to eat each day and at each meal. A dietitian or CDE can teach you how to keep track of the amount of carbs you eat. This is called carbohydrate counting. · If you are not sure how to count carbohydrate grams, use the Plate Method to plan meals. It is a good, quick way to make sure that you have a balanced meal. It also helps you spread carbs throughout the day. ¨ Divide your plate by types of foods.  Put non-starchy vegetables on half the plate, meat or other protein food on one-quarter of the plate, and a grain or starchy vegetable in the final quarter of the plate. To this you can add a small piece of fruit and 1 cup of milk or yogurt, depending on how many carbs you are supposed to eat at a meal. 
· Try to eat about the same amount of carbs at each meal. Do not \"save up\" your daily allowance of carbs to eat at one meal. 
· Proteins have very little or no carbs per serving. Examples of proteins are beef, chicken, turkey, fish, eggs, tofu, cheese, cottage cheese, and peanut butter. A serving size of meat is 3 ounces, which is about the size of a deck of cards. Examples of meat substitute serving sizes (equal to 1 ounce of meat) are 1/4 cup of cottage cheese, 1 egg, 1 tablespoon of peanut butter, and ½ cup of tofu. How can you eat out and still eat healthy? · Learn to estimate the serving sizes of foods that have carbohydrate. If you measure food at home, it will be easier to estimate the amount in a serving of restaurant food. · If the meal you order has too much carbohydrate (such as potatoes, corn, or baked beans), ask to have a low-carbohydrate food instead. Ask for a salad or green vegetables. · If you use insulin, check your blood sugar before and after eating out to help you plan how much to eat in the future. · If you eat more carbohydrate at a meal than you had planned, take a walk or do other exercise. This will help lower your blood sugar. What else should you know? · Limit saturated fat, such as the fat from meat and dairy products. This is a healthy choice because people who have diabetes are at higher risk of heart disease. So choose lean cuts of meat and nonfat or low-fat dairy products. Use olive or canola oil instead of butter or shortening when cooking. · Don't skip meals. Your blood sugar may drop too low if you skip meals and take insulin or certain medicines for diabetes. · Check with your doctor before you drink alcohol. Alcohol can cause your blood sugar to drop too low. Alcohol can also cause a bad reaction if you take certain diabetes medicines. Follow-up care is a key part of your treatment and safety. Be sure to make and go to all appointments, and call your doctor if you are having problems. It's also a good idea to know your test results and keep a list of the medicines you take. Where can you learn more? Go to http://milind-joe.info/. Enter K706 in the search box to learn more about \"Learning About Diabetes Food Guidelines. \" Current as of: March 13, 2017 Content Version: 11.4 © 5774-6935 [employer]. Care instructions adapted under license by [employer] (which disclaims liability or warranty for this information). If you have questions about a medical condition or this instruction, always ask your healthcare professional. [address] any warranty or liability for your use of this information. Introducing [facility]! Iona Zimmerman introduces [employer] patient portal. Now you can access parts of your medical record, email your doctor's office, and request medication refills online. 1. In your internet browser, go to [email or web address] 2. Click on the First Time User? Click Here link in the Sign In box. You will see the New Member Sign Up page. 3. Enter your [employer] Access Code exactly as it appears below. You will not need to use this code after youve completed the sign-up process. If you do not sign up before the expiration date, you must request a new code. · [employer] Access Code: [email or web address] Expires: 1/24/2018 12:36 PM 
 
4. Enter the last four digits of your Social Security Number (xxxx) and Date of Birth (mm/dd/yyyy) as indicated and click Submit. You will be taken to the next sign-up page. 5. Create a Housebites ID. This will be your Housebites login ID and cannot be changed, so think of one that is secure and easy to remember. 6. Create a Housebites password. You can change your password at any time. 7. Enter your Password Reset Question and Answer. This can be used at a later time if you forget your password. 8. Enter your e-mail address. You will receive e-mail notification when new information is available in [address] 9. Click Sign Up. You can now view and download portions of your medical record. 10. Click the Download Summary menu link to download a portable copy of your medical information. If you have questions, please visit the Frequently Asked Questions section of the Housebites website. Remember, Housebites is NOT to be used for urgent needs. For medical emergencies, dial 911. Now available from your iPhone and Android! Please provide this summary of care documentation to your next provider. Your primary care clinician is listed as NELLY CASTILLO. If you have any questions after today's visit, please call [phone].

## 2017-12-06 NOTE — PROGRESS NOTES
Subjective:      Chief Complaint   Patient presents with    Cough     3 week follow up        Vianca Hart is an 50 y.o. male here for follow up on of cough. The cough is non-productive (only occasional productive in the AM), without wheezing, dyspnea or hemoptysis. Described as harsh/dry and is aggravated by dust and air pollutants, extreme exercise but albuterol never helps. His onset of symptoms was about 7 weeks ago   I saw him on 11/14/17 as a new patient for the cough after he had already completed antibiotics and treatment. He Was treated with azithromycin, tessalon, albuterol inhaler and robitussin with codeine at  on 10/26/17. Was also using Albuterol inhaler but said that was  \"not doing anything\". I decided to stop his lisinopril to see if that was culprit for dry cough, he was not on lisnipril for BP control, but rather kidney protection when he was on a diabetic medication but he recalled that he was taken off that medication but never taken off the lisinopril. His BP at home has \"been perfectly fine off of it\". Says that he feels that his symptoms are gradually improving but still has dry cough,\"just not as harsh and not as often but still happens a lot\". Also started on Zyrtec ( for the past three weeks )and Flonase (past two weeks) and using the codeine cough syrup only as needed. Still using saline flushes once every other day or so. Denies any sinus pain or nasal congestion or sore throat. Has been on Protonix \"for years, 2-3 years. Dr Saji Francis put me on it for similar symptoms but I dont recall if that helped that problem then and he never took me off of it\", does not feel that he is having GERD symptoms (no burning, no bad taste in his mouth, symptoms not worse after eating and not worse at night). Past Medical History:   Diagnosis Date    Diabetes ([address]     Hypercholesterolemia     Sleep apnea 2015     History reviewed. No pertinent family history.   Current Outpatient Prescriptions   Medication Sig Dispense Refill    naproxen (NAPROSYN) 250 mg tablet Take  by mouth two (2) times daily (with meals).  INSULIN GLARGINE,HUM. REC. ANLOG (TOUJEO SOLOSTAR SC) by SubCUTAneous route.  albuterol (PROVENTIL HFA, VENTOLIN HFA, PROAIR HFA) 90 mcg/actuation inhaler Take 2 Puffs by inhalation every six (6) hours as needed for Wheezing. 1 Inhaler 0    metFORMIN ER 1,000 mg tr24 Take 2,000 mg by mouth.  Liraglutide (VICTOZA 2-KIYA) 0.6 mg/0.1 mL (18 mg/3 mL) sub-q pen 1.8 mg by SubCUTAneous route.  atorvastatin (LIPITOR) 40 mg tablet Take 40 mg by mouth daily.  ergocalciferol (VITAMIN D2) 50,000 unit capsule Take 50,000 Units by mouth.  vit B Cmplx 3-FA-Vit C-Biotin (NEPHRO JENARO RX) 1- mg-mg-mcg tablet Take 1 Tab by mouth daily.  pantoprazole (PROTONIX) 40 mg tablet Take 40 mg by mouth daily.  TESTOSTERONE (AXIRON TD) by TransDERmal route.  guaiFENesin-codeine (ROBITUSSIN AC) 100-10 mg/5 mL solution Take 5 mL by mouth three (3) times daily as needed for Cough. Max Daily Amount: 15 mL. 120 mL 0     Allergies   Allergen Reactions    Dolobid [Diflunisal] Rash     Social History     Social History    Marital status:      Spouse name: N/A    Number of children: N/A    Years of education: N/A     Occupational History    Not on file.      Social History Main Topics    Smoking status: Former Smoker    Smokeless tobacco: Never Used    Alcohol use Yes      Comment: social     Drug use: No    Sexual activity: Not on file     Other Topics Concern    Not on file     Social History Narrative     Review of Systems  ROS:  Gen: no fatigue, fever, chills  Eyes: no excessive tearing, itching, or discharge  Nose: no rhinorrhea, no sinus pain  Mouth: no oral lesions, no sore throat  Resp: no shortness of breath, no wheezing  CV: no chest pain, no paroxysmal nocturnal dyspnea  Abd: no nausea, no heartburn, no diarrhea, no constipation, no abdominal pain  Neuro: no headaches, no syncope or presyncopal episodes  Endo: no polyuria, no polydipsia  Heme: no lymphadenopathy, no easy bruising or bleeding      Objective:     Visit Vitals    /87 (BP 1 Location: Left arm, BP Patient Position: Sitting)    Pulse 98    Temp 97.9 °F (36.6 °C) (Oral)    Resp 20    Ht 6' 1\" (1.854 m)    Wt 261 lb 3.2 oz (118.5 kg)    SpO2 95%    BMI 34.46 kg/m2     General:  alert, cooperative, no distress, appears stated age   HEENT:  ENT exam normal, no neck nodes or sinus tenderness   Lungs: clear to auscultation bilaterally   Heart:  regular rate and rhythm, S1, S2 normal, no murmur, click, rub or gallop   Abdomen: soft, non-tender. Bowel sounds normal. No masses,  no organomegaly   Extremities: extremities normal, atraumatic, no cyanosis or edema      Neurological: alert, oriented x 3, no defects noted in general exam.         Assessment/Plan:   Differential diagnosis and treatment options reviewed with patient who is in agreement with treatment plan as outlined below. ICD-10-CM ICD-9-CM    1. Dry cough R05 786.2 REFERRAL TO ENT-OTOLARYNGOLOGY     BP stable off lisinopril. No need to add additional antihypertensive at this time. Call if shortness of breath worsens, blood in sputum, change in character of cough, development of fever or chills, inability to maintain nutrition and hydration. Avoid exposure to tobacco smoke, fumes. Symptoms are slowly improving and may just take time to resolve all the way given the extreme coughing that he was having. CXR ordered at previous visit, he will go have done to rule out any lung abnormality. ENT consult given due to chronic upper airway irritant, lungs are clear and no improvement from albuterol so unlikely stemming from lungs, may need scope of upper airway or allergy testing. Continue Zyrtec and Flonase.   Also advised to use OTC nasal saline 2 sprays each nostril 2-3 times per day to assist with eustachian tube draining. Trial of H-2 blocker:  Discussed weaning off Protonix and starting Zantac or Pepcid, no need for chronic PPI and may benefit more from H2 blocker if allergen induced. Follow up as needed or 1 month if no improvement. Verbal and written instructions (see AVS) provided. Patient expresses understanding and agreement of diagnosis and treatment plan.

## 2018-09-04 ENCOUNTER — OFFICE VISIT (OUTPATIENT)
Dept: FAMILY MEDICINE CLINIC | Age: 48
End: 2018-09-04

## 2018-09-04 VITALS
TEMPERATURE: 97.9 F | HEART RATE: 102 BPM | BODY MASS INDEX: 34.72 KG/M2 | DIASTOLIC BLOOD PRESSURE: 87 MMHG | HEIGHT: 73 IN | OXYGEN SATURATION: 93 % | SYSTOLIC BLOOD PRESSURE: 126 MMHG | WEIGHT: 262 LBS | RESPIRATION RATE: 16 BRPM

## 2018-09-04 DIAGNOSIS — K42.9 UMBILICAL HERNIA WITHOUT OBSTRUCTION AND WITHOUT GANGRENE: ICD-10-CM

## 2018-09-04 DIAGNOSIS — Z00.00 ENCOUNTER FOR WELLNESS EXAMINATION IN ADULT: Primary | ICD-10-CM

## 2018-09-04 DIAGNOSIS — E11.41 DIABETIC MONONEUROPATHY ASSOCIATED WITH TYPE 2 DIABETES MELLITUS (HCC): ICD-10-CM

## 2018-09-04 DIAGNOSIS — K59.00 CONSTIPATION, UNSPECIFIED CONSTIPATION TYPE: ICD-10-CM

## 2018-09-04 DIAGNOSIS — R26.89 BALANCE PROBLEMS: ICD-10-CM

## 2018-09-04 DIAGNOSIS — Z23 NEED FOR TDAP VACCINATION: ICD-10-CM

## 2018-09-04 DIAGNOSIS — E29.1 HYPOGONADISM MALE: ICD-10-CM

## 2018-09-04 DIAGNOSIS — M25.562 CHRONIC PAIN OF LEFT KNEE: ICD-10-CM

## 2018-09-04 DIAGNOSIS — Z12.11 SPECIAL SCREENING FOR MALIGNANT NEOPLASM OF COLON: ICD-10-CM

## 2018-09-04 DIAGNOSIS — G89.29 CHRONIC PAIN OF LEFT KNEE: ICD-10-CM

## 2018-09-04 RX ORDER — RANITIDINE 150 MG/1
150 CAPSULE ORAL 2 TIMES DAILY
COMMUNITY
End: 2020-09-05

## 2018-09-04 RX ORDER — CHOLECALCIFEROL TAB 125 MCG (5000 UNIT) 125 MCG
TAB ORAL DAILY
COMMUNITY
End: 2018-11-28

## 2018-09-04 RX ORDER — EMPAGLIFLOZIN 25 MG/1
25 TABLET, FILM COATED ORAL DAILY
COMMUNITY
Start: 2018-07-09

## 2018-09-04 NOTE — PROGRESS NOTES
Chief Complaint Patient presents with  Physical  
 Joint Pain  
  left knee and left hip  Umbilical Hernia 1. Have you been to the ER, urgent care clinic since your last visit? Hospitalized since your last visit? No 
 
2. Have you seen or consulted any other health care providers outside of the [address] [place] [address] since your last visit? Include any pap smears or colon screening. No  
 
Pt has fasted this morning, pt did drink milk this morning to take his medications. Pt goes to endocrinology for management of diabetes and sees them every 3 months.   
 
TDaP vaccine given today per Roya Ludwig NP.

## 2018-09-04 NOTE — PATIENT INSTRUCTIONS
DTaP (Diphtheria, Tetanus, Pertussis) Vaccine: What You Need to Know Why get vaccinated? Diphtheria, tetanus, and pertussis are serious diseases caused by bacteria. Diphtheria and pertussis are spread from person to person. Tetanus enters the body through cuts or wounds. DIPHTHERIA causes a thick covering in the back of the throat. · It can lead to breathing problems, paralysis, heart failure, and even death. TETANUS (Lockjaw) causes painful tightening of the muscles, usually all over the body. · It can lead to \"locking\" of the jaw so the victim cannot open his mouth or swallow. Tetanus leads to death in up to 2 out of 10 cases. PERTUSSIS (Whooping Cough) causes coughing spells so bad that it is hard for infants to eat, drink, or breathe. These spells can last for weeks. · It can lead to pneumonia, seizures (jerking and staring spells), brain damage, and death. Diphtheria, tetanus, and pertussis vaccine (DTaP) can help prevent these diseases. Most children who are vaccinated with DTaP will be protected throughout childhood. Many more children would get these diseases if we stopped vaccinating. DTaP is a safer version of an older vaccine called DTP. DTP is no longer used in the [place]. Who should get DTaP vaccine and when? Children should get 5 doses of DTaP vaccine, one dose at each of the following ages: · 2 months · 4 months · 6 months · 15-18 months · 4-6 years DTaP may be given at the same time as other vaccines. Some children should not get DTaP vaccine or should wait. · Children with minor illnesses, such as a cold, may be vaccinated. But children who are moderately or severely ill should usually wait until they recover before getting DTaP vaccine. · Any child who had a life-threatening allergic reaction after a dose of DTaP should not get another dose. · Any child who suffered a brain or nervous system disease within 7 days after a dose of DTaP should not get another dose. · Talk with your doctor if your child: 
Diamond Kim Had a seizure or collapsed after a dose of DTaP. ¨ Cried non-stop for 3 hours or more after a dose of DTaP. ¨ Had a fever over 105°F after a dose of DTaP. Ask your doctor for more information. Some of these children should not get another dose of pertussis vaccine, but may get a vaccine without pertussis, called DT. Older children and adults DTaP is not licensed for adolescents, adults, or children 9years of age and older. But older people still need protection. A vaccine called Tdap is similar to DTaP. A single dose of Tdap is recommended for people 11 through 59years of age. Another vaccine, called Td, protects against tetanus and diphtheria, but not pertussis. It is recommended every 10 years. There are separate Vaccine Information Statements for these vaccines. What are the risks from DTaP vaccine? Getting diphtheria, tetanus, or pertussis disease is much riskier than getting DTaP vaccine. However, a vaccine, like any medicine, is capable of causing serious problems, such as severe allergic reactions. The risk of DTaP vaccine causing serious harm, or death, is extremely small. Mild Problems (Common) · Fever (up to about 1 child in 4) · Redness or swelling where the shot was given (up to about 1 child in 4) · Soreness or tenderness where the shot was given (up to about 1 child in 4) These problems occur more often after the 4th and 5th doses of the DTaP series than after earlier doses. Sometimes the 4th or 5th dose of DTaP vaccine is followed by swelling of the entire arm or leg in which the shot was given, lasting 1-7 days (up to about 1 child in 27). Other mild problems include: · Fussiness (up to about 1 child in 3) · Tiredness or poor appetite (up to about 1 child in 10) · Vomiting (up to about 1 child in 48) These problems generally occur 1-3 days after the shot. Moderate Problems (Uncommon) · Seizure (jerking or staring) (about 1 child out of 14,000) · Non-stop crying, for 3 hours or more (up to about 1 child out of 1,000) · High fever, over 105°F (about 1 child out of 16,000) Severe Problems (Very Rare) · Serious allergic reaction (less than 1 out of a million doses) · Several other severe problems have been reported after DTaP vaccine. These include: 
¨ Long-term seizures, coma, or lowered consciousness. ¨ Permanent brain damage. These are so rare it is hard to tell if they are caused by the vaccine. Controlling fever is especially important for children who have had seizures, for any reason. It is also important if another family member has had seizures. You can reduce fever and pain by giving your child an aspirin-free pain reliever when the shot is given, and for the next 24 hours, following the package instructions. What if there is a serious reaction? What should I look for? · Look for anything that concerns you, such as signs of a severe allergic reaction, very high fever, or behavior changes. Signs of a severe allergic reaction can include hives, swelling of the face and throat, difficulty breathing, a fast heartbeat, dizziness, and weakness. These would start a few minutes to a few hours after the vaccination. What should I do? · If you think it is a severe allergic reaction or other emergency that can't wait, call 9-1-1 or get the person to the nearest hospital. Otherwise, call your doctor. · Afterward, the reaction should be reported to the Vaccine Adverse Event Reporting System (VAERS). Your doctor might file this report, or you can do it yourself through the VAERS web site at www.vaers. hhs.gov, or by calling 2-967.610.8822. VAERS is only for reporting reactions. They do not give medical advice.  
The [employer] Vaccine Injury Compensation Program 
The [employer] [place] Vaccine Injury Compensation Program (VICP) is a federal program that was created to compensate people who may have been injured by certain vaccines. Persons who believe they may have been injured by a vaccine can learn about the program and about filing a claim by calling 1-899.780.5786 or visiting the [employer] website at www.[employer].gov/vaccinecompensation. How can I learn more? · Ask your doctor. · Call your local or state health department. · Contact the Centers for Disease Control and Prevention (CDC): 
¨ Call 8-372.515.1922 (1-800-CDC-INFO) or ¨ Visit CDC's website at www.cdc.gov/vaccines Vaccine Information Statement DTaP (Tetanus, Diphtheria, Pertussis ) Vaccine 
(5/17/2007) 42 FAW[address] [id number] [employer] Centers for Disease Control and Prevention Many Vaccine Information Statements are available in [place] and other languages. See www.immunize.org/vis. Muchas hojas de información sobre vacunas están disponibles en español y en otros idiomas. Visite www.immunize.org/vis. Care instructions adapted under license by [employer] (which disclaims liability or warranty for this information). If you have questions about a medical condition or this instruction, always ask your healthcare professional. [place] [address] any warranty or liability for your use of this information. Colon Cancer Screening: Care Instructions Your Care Instructions Colorectal cancer occurs in the colon or rectum. That's the lower part of your digestive system. It is the second-leading cause of cancer deaths in the [place]. It often starts with small growths called polyps in the colon or rectum. Polyps are usually found with screening tests. Depending on the type of test, any polyps found may be removed during the tests.  
Colorectal cancer usually does not cause symptoms at first. But regular tests can help find it early, before it spreads and becomes harder to treat. Experts advise routine tests for colon cancer for people starting at age 48. And they advise people with a higher risk of colon cancer to get tested sooner. Talk with your doctor about when you should start testing. Discuss which tests you need. Follow-up care is a key part of your treatment and safety. Be sure to make and go to all appointments, and call your doctor if you are having problems. It's also a good idea to know your test results and keep a list of the medicines you take. What are the main screening tests for colon cancer? · Stool tests. These include the fecal immunochemical test (FIT) and the fecal occult blood test (FOBT). These tests check stool samples for signs of cancer. If your test is positive, you will need to have a colonoscopy. · Sigmoidoscopy. This test lets your doctor look at the lining of your rectum and the lowest part of your colon. Your doctor uses a lighted tube called a sigmoidoscope. This test can't find cancers or polyps in the upper part of your colon. In some cases, polyps that are found can be removed. But if your doctor finds polyps, you will need to have a colonoscopy to check the upper part of your colon. · Colonoscopy. This test lets your doctor look at the lining of your rectum and your entire colon. The doctor uses a thin, flexible tool called a colonoscope. It can also be used to remove polyps or get a tissue sample (biopsy). What tests do you need? The following guidelines are for people age 48 and over who are not at high risk for colorectal cancer. You may have at least one of these tests as directed by your doctor. · Fecal immunochemical test (FIT) or fecal occult blood test (FOBT) every year · Sigmoidoscopy every 5 years · Colonoscopy every 10 years If you are age 68 to 80, you can work with your doctor to decide if screening is a good option. If you are age 80 or older, your doctor will likely advise that screening is not helpful. Talk with your doctor about when you need to be tested. And discuss which tests are right for you. Your doctor may recommend earlier or more frequent testing if you: 
· Have had colorectal cancer before. · Have had colon polyps. · Have symptoms of colorectal cancer. These include blood in your stool and changes in your bowel habits. · Have a parent, brother or sister, or child with colon polyps or colorectal cancer. · Have a bowel disease. This includes ulcerative colitis and Crohn's disease. · Have a rare polyp syndrome that runs in families, such as familial adenomatous polyposis (FAP). · Have had radiation treatments to the belly or pelvis. When should you call for help? Watch closely for changes in your health, and be sure to contact your doctor if: 
  · You have any changes in your bowel habits.  
  · You have any problems. Where can you learn more? Go to http://milind-joe.info/. Enter M541 in the search box to learn more about \"Colon Cancer Screening: Care Instructions. \" Current as of: May 12, 2017 Content Version: 11.7 © 7114-0160 [employer]. Care instructions adapted under license by [employer] (which disclaims liability or warranty for this information). If you have questions about a medical condition or this instruction, always ask your healthcare professional. [place] [address] any warranty or liability for your use of this information. Well Visit, Men 48 to 72: Care Instructions Your Care Instructions Physical exams can help you stay healthy. Your doctor has checked your overall health and may have suggested ways to take good care of yourself. He or she also may have recommended tests. At home, you can help prevent illness with healthy eating, regular exercise, and other steps. Follow-up care is a key part of your treatment and safety.  Be sure to make and go to all appointments, and call your doctor if you are having problems. It's also a good idea to know your test results and keep a list of the medicines you take. How can you care for yourself at home? · Reach and stay at a healthy weight. This will lower your risk for many problems, such as obesity, diabetes, heart disease, and high blood pressure. · Get at least 30 minutes of exercise on most days of the week. Walking is a good choice. You also may want to do other activities, such as running, swimming, cycling, or playing tennis or team sports. · Do not smoke. Smoking can make health problems worse. If you need help quitting, talk to your doctor about stop-smoking programs and medicines. These can increase your chances of quitting for good. · Protect your skin from too much sun. When you're outdoors from 10 a.m. to 4 p.m., stay in the shade or cover up with clothing and a hat with a wide brim. Wear sunglasses that block UV rays. Even when it's cloudy, put broad-spectrum sunscreen (SPF 30 or higher) on any exposed skin. · See a dentist one or two times a year for checkups and to have your teeth cleaned. · Wear a seat belt in the car. · Limit alcohol to 2 drinks a day. Too much alcohol can cause health problems. Follow your doctor's advice about when to have certain tests. These tests can spot problems early. · Cholesterol. Your doctor will tell you how often to have this done based on your overall health and other things that can increase your risk for heart attack and stroke. · Blood pressure. Have your blood pressure checked during a routine doctor visit. Your doctor will tell you how often to check your blood pressure based on your age, your blood pressure results, and other factors. · Prostate exam. Talk to your doctor about whether you should have a blood test (called a PSA test) for prostate cancer.  Experts disagree on whether men should have this test. Some experts recommend that you discuss the benefits and risks of the test with your doctor. · Diabetes. Ask your doctor whether you should have tests for diabetes. · Vision. Some experts recommend that you have yearly exams for glaucoma and other age-related eye problems starting at age 48. · Hearing. Tell your doctor if you notice any change in your hearing. You can have tests to find out how well you hear. · Colon cancer. You should begin tests for colon cancer at age 48. You may have one of several tests. Your doctor will tell you how often to have tests based on your age and risk. Risks include whether you already had a precancerous polyp removed from your colon or whether your parent, brother, sister, or child has had colon cancer. · Heart attack and stroke risk. At least every 4 to 6 years, you should have your risk for heart attack and stroke assessed. Your doctor uses factors such as your age, blood pressure, cholesterol, and whether you smoke or have diabetes to show what your risk for a heart attack or stroke is over the next 10 years. · Abdominal aortic aneurysm. Ask your doctor whether you should have a test to check for an aneurysm. You may need a test if you ever smoked or if your parent, brother, sister, or child has had an aneurysm. When should you call for help? Watch closely for changes in your health, and be sure to contact your doctor if you have any problems or symptoms that concern you. Where can you learn more? Go to http://milind-joe.info/. Enter G425 in the search box to learn more about \"Well Visit, Men 48 to 72: Care Instructions. \" Current as of: La 10, 2017 Content Version: 11.7 © 4668-1433 [employer]. Care instructions adapted under license by [employer] (which disclaims liability or warranty for this information).  If you have questions about a medical condition or this instruction, always ask your healthcare professional. [address] any warranty or liability for your use of this information. Hernia: Care Instructions Your Care Instructions A hernia develops when tissue bulges through a weak spot in the wall of your belly. The groin area and the navel are common areas for a hernia. A hernia can also develop near the area of a surgery you had before. Pressure from lifting, straining, or coughing can tear the weak area, causing the hernia to bulge and be painful. If you cannot push a hernia back into place, the tissue may become trapped outside the belly wall. If the hernia gets twisted and loses its blood supply, it will swell and die. This is called a strangulated hernia. It usually causes a lot of pain. It needs treatment right away. Some hernias need to be repaired to prevent a strangulated hernia. If your hernia causes symptoms or is large, you may need surgery. Follow-up care is a key part of your treatment and safety. Be sure to make and go to all appointments, and call your doctor if you are having problems. It's also a good idea to know your test results and keep a list of the medicines you take. How can you care for yourself at home? · Take care when lifting heavy objects. · Stay at a healthy weight. · Do not smoke. Smoking can cause coughing, which can cause your hernia to bulge. If you need help quitting, talk to your doctor about stop-smoking programs and medicines. These can increase your chances of quitting for good. · Talk with your doctor before wearing a corset or truss for a hernia. These devices are not recommended for treating hernias and sometimes can do more harm than good. There may be certain situations when your doctor thinks a truss would work, but these are rare. When should you call for help? Call your doctor now or seek immediate medical care if: 
  · You have new or worse belly pain.   · You are vomiting.  
  · You cannot pass stools or gas.  
  · You cannot push the hernia back into place with gentle pressure when you are lying down.  
  · The area over the hernia turns red or becomes tender.  
 Watch closely for changes in your health, and be sure to contact your doctor if you have any problems. Where can you learn more? Go to http://milind-joe.info/. Enter C129 in the search box to learn more about \"Hernia: Care Instructions. \" Current as of: May 12, 2017 Content Version: 11.7 © 4347-0495 [employer]. Care instructions adapted under license by [employer] (which disclaims liability or warranty for this information). If you have questions about a medical condition or this instruction, always ask your healthcare professional. [address] any warranty or liability for your use of this information.

## 2018-09-04 NOTE — MR AVS SNAPSHOT
[facility] 
 
 
 [address] 
[phone] Patient: Octavio Griffith MRN: [id number] NIKIA:[birth year] Visit Information Date & Time Provider Department Dept. Phone Encounter #  
 9/4/2018  8:30 AM Marcin Meza [place] [id number] [phone] [id number] Upcoming Health Maintenance Date Due  
 LIPID PANEL Q1 1968 FOOT EXAM Q1 4/21/1978 MICROALBUMIN Q1 4/21/1978 EYE EXAM RETINAL OR DILATED Q1 4/21/1978 HEMOGLOBIN A1C Q6M 7/14/2014 FOBT Q 1 YEAR AGE 50-75 4/21/2018 Influenza Age 5 to Adult 8/1/2018 DTaP/Tdap/Td series (2 - Td) 12/6/2027 Allergies as of 9/4/2018  Review Complete On: 9/4/2018 By: Helen Carter NP Severity Noted Reaction Type Reactions Dolobid [Diflunisal]  09/03/2013   Side Effect Rash Current Immunizations  Reviewed on 12/3/2013 Name Date Influenza Vaccine 12/3/2013 Tdap  Incomplete Not reviewed this visit You Were Diagnosed With   
  
 Codes Comments Encounter for wellness examination in adult    -  Primary ICD-10-CM: Z00.00 ICD-9-CM: V70.0 Special screening for malignant neoplasm of colon     ICD-10-CM: Z12.11 ICD-9-CM: V76.51 Constipation, unspecified constipation type     ICD-10-CM: K59.00 ICD-9-CM: 564.00 Umbilical hernia without obstruction and without gangrene     ICD-10-CM: K42.9 ICD-9-CM: 553.1 Need for Tdap vaccination     ICD-10-CM: A38 ICD-9-CM: V06.1 Hypogonadism male     ICD-10-CM: E29.1 ICD-9-CM: 257.2 Chronic pain of left knee     ICD-10-CM: M25.562, G89.29 ICD-9-CM: 719.46, 338.29 Vitals BP Pulse Temp Resp Height(growth percentile) Weight(growth percentile) 126/87 (BP 1 Location: Left arm, BP Patient Position: Sitting) (!) 102 97.9 °F (36.6 °C) (Oral) 16 6' 1\" (1.854 m) 262 lb (118.8 kg) SpO2 BMI Smoking Status 93% 34.57 kg/m2 Former Smoker Vitals History BMI and BSA Data Body Mass Index Body Surface Area 34.57 kg/m 2 2.47 m 2 Preferred Pharmacy Pharmacy Name Phone [facility], [address] [place] [address] [phone] Your Updated Medication List  
  
   
This list is accurate as of 9/4/18  9:38 AM.  Always use your most recent med list.  
  
  
  
  
 atorvastatin 40 mg tablet Commonly known as:  LIPITOR Take 40 mg by mouth daily. AXIRON TD  
by TransDERmal route. cholecalciferol (VITAMIN D3) 5,000 unit Tab tablet Commonly known as:  VITAMIN D3 Take  by mouth daily. JARDIANCE 25 mg tablet Generic drug:  empagliflozin  
  
 metFORMIN ER 1,000 mg Tr24 Take 2,000 mg by mouth. naproxen 250 mg tablet Commonly known as:  NAPROSYN Take  by mouth two (2) times daily (with meals). raNITIdine hcl 150 mg capsule Take 150 mg by mouth two (2) times a day. TOUJEO SOLOSTAR SC  
100 Units by SubCUTAneous route. VICTOZA 2-KIYA 0.6 mg/0.1 mL (18 mg/3 mL) Pnij Generic drug:  Liraglutide 1.8 mg by SubCUTAneous route. vit B Cmplx 3-FA-Vit C-Biotin 1- mg-mg-mcg tablet Commonly known as:  NEPHRO JENARO RX Take 1 Tab by mouth daily. We Performed the Following IA IMMUNIZ ADMIN,1 SINGLE/COMB VAC/TOXOID [id number] CPT(R)] REFERRAL TO GASTROENTEROLOGY [ERO20 Custom] Comments:  
 Please evaluate patient for screening colonoscopy and possible IBS symptoms. REFERRAL TO GENERAL SURGERY [REF27 Custom] TETANUS, DIPHTHERIA TOXOIDS AND ACELLULAR PERTUSSIS VACCINE (TDAP), IN INDIVIDS. >=7, IM [id number] CPT(R)] Referral Information Referral ID Referred By Referred To  
  
 [id number] [facility] [place] [address] [place], [address] [facility] Visits Status Start Date End Date 1 Authorized 9/4/18 9/4/19  If your referral has a status of pending review or denied, additional information will be sent to support the outcome of this decision. Referral ID Referred By Referred To  
 [id number] Kalpesh CASTILLO MD  
   [address] [place] [address] 3 [address] [place], [address] [place] [address] Street Phone: [phone] Fax: [phone] Visits Status Start Date End Date 1 Authorized 9/4/18 9/4/19 If your referral has a status of pending review or denied, additional information will be sent to support the outcome of this decision. Patient Instructions DTaP (Diphtheria, Tetanus, Pertussis) Vaccine: What You Need to Know Why get vaccinated? Diphtheria, tetanus, and pertussis are serious diseases caused by bacteria. Diphtheria and pertussis are spread from person to person. Tetanus enters the body through cuts or wounds. DIPHTHERIA causes a thick covering in the back of the throat. · It can lead to breathing problems, paralysis, heart failure, and even death. TETANUS (Lockjaw) causes painful tightening of the muscles, usually all over the body. · It can lead to \"locking\" of the jaw so the victim cannot open his mouth or swallow. Tetanus leads to death in up to 2 out of 10 cases. PERTUSSIS (Whooping Cough) causes coughing spells so bad that it is hard for infants to eat, drink, or breathe. These spells can last for weeks. · It can lead to pneumonia, seizures (jerking and staring spells), brain damage, and death. Diphtheria, tetanus, and pertussis vaccine (DTaP) can help prevent these diseases. Most children who are vaccinated with DTaP will be protected throughout childhood. Many more children would get these diseases if we stopped vaccinating. DTaP is a safer version of an older vaccine called DTP. DTP is no longer used in the [place]. Who should get DTaP vaccine and when? Children should get 5 doses of DTaP vaccine, one dose at each of the following ages: · 2 months · 4 months · 6 months · 15-18 months · 4-6 years DTaP may be given at the same time as other vaccines. Some children should not get DTaP vaccine or should wait. · Children with minor illnesses, such as a cold, may be vaccinated. But children who are moderately or severely ill should usually wait until they recover before getting DTaP vaccine. · Any child who had a life-threatening allergic reaction after a dose of DTaP should not get another dose. · Any child who suffered a brain or nervous system disease within 7 days after a dose of DTaP should not get another dose. · Talk with your doctor if your child: 
Mo Orellana Had a seizure or collapsed after a dose of DTaP. ¨ Cried non-stop for 3 hours or more after a dose of DTaP. ¨ Had a fever over 105°F after a dose of DTaP. Ask your doctor for more information. Some of these children should not get another dose of pertussis vaccine, but may get a vaccine without pertussis, called DT. Older children and adults DTaP is not licensed for adolescents, adults, or children 9years of age and older. But older people still need protection. A vaccine called Tdap is similar to DTaP. A single dose of Tdap is recommended for people 11 through 59years of age. Another vaccine, called Td, protects against tetanus and diphtheria, but not pertussis. It is recommended every 10 years. There are separate Vaccine Information Statements for these vaccines. What are the risks from DTaP vaccine? Getting diphtheria, tetanus, or pertussis disease is much riskier than getting DTaP vaccine. However, a vaccine, like any medicine, is capable of causing serious problems, such as severe allergic reactions. The risk of DTaP vaccine causing serious harm, or death, is extremely small. Mild Problems (Common) · Fever (up to about 1 child in 4) · Redness or swelling where the shot was given (up to about 1 child in 4) · Soreness or tenderness where the shot was given (up to about 1 child in 4) These problems occur more often after the 4th and 5th doses of the DTaP series than after earlier doses. Sometimes the 4th or 5th dose of DTaP vaccine is followed by swelling of the entire arm or leg in which the shot was given, lasting 1-7 days (up to about 1 child in 27). Other mild problems include: · Fussiness (up to about 1 child in 3) · Tiredness or poor appetite (up to about 1 child in 10) · Vomiting (up to about 1 child in 48) These problems generally occur 1-3 days after the shot. Moderate Problems (Uncommon) · Seizure (jerking or staring) (about 1 child out of 14,000) · Non-stop crying, for 3 hours or more (up to about 1 child out of 1,000) · High fever, over 105°F (about 1 child out of 16,000) Severe Problems (Very Rare) · Serious allergic reaction (less than 1 out of a million doses) · Several other severe problems have been reported after DTaP vaccine. These include: 
¨ Long-term seizures, coma, or lowered consciousness. ¨ Permanent brain damage. These are so rare it is hard to tell if they are caused by the vaccine. Controlling fever is especially important for children who have had seizures, for any reason. It is also important if another family member has had seizures. You can reduce fever and pain by giving your child an aspirin-free pain reliever when the shot is given, and for the next 24 hours, following the package instructions. What if there is a serious reaction? What should I look for? · Look for anything that concerns you, such as signs of a severe allergic reaction, very high fever, or behavior changes. Signs of a severe allergic reaction can include hives, swelling of the face and throat, difficulty breathing, a fast heartbeat, dizziness, and weakness. These would start a few minutes to a few hours after the vaccination. What should I do?  
· If you think it is a severe allergic reaction or other emergency that can't wait, call 9-1-1 or get the person to the nearest hospital. Otherwise, call your doctor. · Afterward, the reaction should be reported to the Vaccine Adverse Event Reporting System (VAERS). Your doctor might file this report, or you can do it yourself through the VAERS web site at www.vaers. [facility].gov, or by calling 8-224.739.7566. VAERS is only for reporting reactions. They do not give medical advice. The National Vaccine Injury Compensation Program 
The National Vaccine Injury Compensation Program (VICP) is a federal program that was created to compensate people who may have been injured by certain vaccines. Persons who believe they may have been injured by a vaccine can learn about the program and about filing a claim by calling 2-610.164.1212 or visiting the [employer] website at www.[place].gov/vaccinecompensation. How can I learn more? · Ask your doctor. · Call your local or state health department. · Contact the Centers for Disease Control and Prevention (CDC): 
¨ Call 3-910.639.6078 (1-800-CDC-INFO) or ¨ Visit CDC's website at www.cdc.gov/vaccines Vaccine Information Statement DTaP (Tetanus, Diphtheria, Pertussis ) Vaccine 
(5/17/2007) [address] [id number] [facility] and [employer] Centers for Disease Control and Prevention Many Vaccine Information Statements are available in [place] and other languages. See www.immunize.org/vis. Muchas hojas de información sobre vacunas están disponibles en español y en otros idiomas. Visite www.immunize.org/vis. Care instructions adapted under license by [employer] (which disclaims liability or warranty for this information). If you have questions about a medical condition or this instruction, always ask your healthcare professional. [place] [address] any warranty or liability for your use of this information. Colon Cancer Screening: Care Instructions Your Care Instructions Colorectal cancer occurs in the colon or rectum. That's the lower part of your digestive system. It is the second-leading cause of cancer deaths in the [place]. It often starts with small growths called polyps in the colon or rectum. Polyps are usually found with screening tests. Depending on the type of test, any polyps found may be removed during the tests. Colorectal cancer usually does not cause symptoms at first. But regular tests can help find it early, before it spreads and becomes harder to treat. Experts advise routine tests for colon cancer for people starting at age 48. And they advise people with a higher risk of colon cancer to get tested sooner. Talk with your doctor about when you should start testing. Discuss which tests you need. Follow-up care is a key part of your treatment and safety. Be sure to make and go to all appointments, and call your doctor if you are having problems. It's also a good idea to know your test results and keep a list of the medicines you take. What are the main screening tests for colon cancer? · Stool tests. These include the fecal immunochemical test (FIT) and the fecal occult blood test (FOBT). These tests check stool samples for signs of cancer. If your test is positive, you will need to have a colonoscopy. · Sigmoidoscopy. This test lets your doctor look at the lining of your rectum and the lowest part of your colon. Your doctor uses a lighted tube called a sigmoidoscope. This test can't find cancers or polyps in the upper part of your colon. In some cases, polyps that are found can be removed. But if your doctor finds polyps, you will need to have a colonoscopy to check the upper part of your colon. · Colonoscopy. This test lets your doctor look at the lining of your rectum and your entire colon. The doctor uses a thin, flexible tool called a colonoscope. It can also be used to remove polyps or get a tissue sample (biopsy). What tests do you need? The following guidelines are for people age 48 and over who are not at high risk for colorectal cancer. You may have at least one of these tests as directed by your doctor. · Fecal immunochemical test (FIT) or fecal occult blood test (FOBT) every year · Sigmoidoscopy every 5 years · Colonoscopy every 10 years If you are age 68 to 80, you can work with your doctor to decide if screening is a good option. If you are age 80 or older, your doctor will likely advise that screening is not helpful. Talk with your doctor about when you need to be tested. And discuss which tests are right for you. Your doctor may recommend earlier or more frequent testing if you: 
· Have had colorectal cancer before. · Have had colon polyps. · Have symptoms of colorectal cancer. These include blood in your stool and changes in your bowel habits. · Have a parent, brother or sister, or child with colon polyps or colorectal cancer. · Have a bowel disease. This includes ulcerative colitis and Crohn's disease. · Have a rare polyp syndrome that runs in families, such as familial adenomatous polyposis (FAP). · Have had radiation treatments to the belly or pelvis. When should you call for help? Watch closely for changes in your health, and be sure to contact your doctor if: 
  · You have any changes in your bowel habits.  
  · You have any problems. Where can you learn more? Go to http://milind-joe.info/. Enter M541 in the search box to learn more about \"Colon Cancer Screening: Care Instructions. \" Current as of: May 12, 2017 Content Version: 11.7 © 9338-2452 [employer]. Care instructions adapted under license by [employer] (which disclaims liability or warranty for this information).  If you have questions about a medical condition or this instruction, always ask your healthcare professional. [place] [address] any warranty or liability for your use of this information. Well Visit, Men 48 to 72: Care Instructions Your Care Instructions Physical exams can help you stay healthy. Your doctor has checked your overall health and may have suggested ways to take good care of yourself. He or she also may have recommended tests. At home, you can help prevent illness with healthy eating, regular exercise, and other steps. Follow-up care is a key part of your treatment and safety. Be sure to make and go to all appointments, and call your doctor if you are having problems. It's also a good idea to know your test results and keep a list of the medicines you take. How can you care for yourself at home? · Reach and stay at a healthy weight. This will lower your risk for many problems, such as obesity, diabetes, heart disease, and high blood pressure. · Get at least 30 minutes of exercise on most days of the week. Walking is a good choice. You also may want to do other activities, such as running, swimming, cycling, or playing tennis or team sports. · Do not smoke. Smoking can make health problems worse. If you need help quitting, talk to your doctor about stop-smoking programs and medicines. These can increase your chances of quitting for good. · Protect your skin from too much sun. When you're outdoors from 10 a.m. to 4 p.m., stay in the shade or cover up with clothing and a hat with a wide brim. Wear sunglasses that block UV rays. Even when it's cloudy, put broad-spectrum sunscreen (SPF 30 or higher) on any exposed skin. · See a dentist one or two times a year for checkups and to have your teeth cleaned. · Wear a seat belt in the car. · Limit alcohol to 2 drinks a day. Too much alcohol can cause health problems. Follow your doctor's advice about when to have certain tests. These tests can spot problems early. · Cholesterol.  Your doctor will tell you how often to have this done based on your overall health and other things that can increase your risk for heart attack and stroke. · Blood pressure. Have your blood pressure checked during a routine doctor visit. Your doctor will tell you how often to check your blood pressure based on your age, your blood pressure results, and other factors. · Prostate exam. Talk to your doctor about whether you should have a blood test (called a PSA test) for prostate cancer. Experts disagree on whether men should have this test. Some experts recommend that you discuss the benefits and risks of the test with your doctor. · Diabetes. Ask your doctor whether you should have tests for diabetes. · Vision. Some experts recommend that you have yearly exams for glaucoma and other age-related eye problems starting at age 48. · Hearing. Tell your doctor if you notice any change in your hearing. You can have tests to find out how well you hear. · Colon cancer. You should begin tests for colon cancer at age 48. You may have one of several tests. Your doctor will tell you how often to have tests based on your age and risk. Risks include whether you already had a precancerous polyp removed from your colon or whether your parent, brother, sister, or child has had colon cancer. · Heart attack and stroke risk. At least every 4 to 6 years, you should have your risk for heart attack and stroke assessed. Your doctor uses factors such as your age, blood pressure, cholesterol, and whether you smoke or have diabetes to show what your risk for a heart attack or stroke is over the next 10 years. · Abdominal aortic aneurysm. Ask your doctor whether you should have a test to check for an aneurysm. You may need a test if you ever smoked or if your parent, brother, sister, or child has had an aneurysm. When should you call for help? Watch closely for changes in your health, and be sure to contact your doctor if you have any problems or symptoms that concern you. Where can you learn more? Go to http://milind-joe.info/. Enter Y503 in the search box to learn more about \"Well Visit, Men 48 to 72: Care Instructions. \" Current as of: La 10, 2017 Content Version: 11.7 © 9557-8754 [employer]. Care instructions adapted under license by [facility] (which disclaims liability or warranty for this information). If you have questions about a medical condition or this instruction, always ask your healthcare professional. [place] [address] any warranty or liability for your use of this information. Hernia: Care Instructions Your Care Instructions A hernia develops when tissue bulges through a weak spot in the wall of your belly. The groin area and the navel are common areas for a hernia. A hernia can also develop near the area of a surgery you had before. Pressure from lifting, straining, or coughing can tear the weak area, causing the hernia to bulge and be painful. If you cannot push a hernia back into place, the tissue may become trapped outside the belly wall. If the hernia gets twisted and loses its blood supply, it will swell and die. This is called a strangulated hernia. It usually causes a lot of pain. It needs treatment right away. Some hernias need to be repaired to prevent a strangulated hernia. If your hernia causes symptoms or is large, you may need surgery. Follow-up care is a key part of your treatment and safety. Be sure to make and go to all appointments, and call your doctor if you are having problems. It's also a good idea to know your test results and keep a list of the medicines you take. How can you care for yourself at home? · Take care when lifting heavy objects. · Stay at a healthy weight. · Do not smoke. Smoking can cause coughing, which can cause your hernia to bulge. If you need help quitting, talk to your doctor about stop-smoking programs and medicines. These can increase your chances of quitting for good. · Talk with your doctor before wearing a corset or truss for a hernia. These devices are not recommended for treating hernias and sometimes can do more harm than good. There may be certain situations when your doctor thinks a truss would work, but these are rare. When should you call for help? Call your doctor now or seek immediate medical care if: 
  · You have new or worse belly pain.  
  · You are vomiting.  
  · You cannot pass stools or gas.  
  · You cannot push the hernia back into place with gentle pressure when you are lying down.  
  · The area over the hernia turns red or becomes tender.  
 Watch closely for changes in your health, and be sure to contact your doctor if you have any problems. Where can you learn more? Go to http://milind-joe.info/. Enter C129 in the search box to learn more about \"Hernia: Care Instructions. \" Current as of: May 12, 2017 Content Version: 11.7 © 2779-2191 [employer]. Care instructions adapted under license by [employer] (which disclaims liability or warranty for this information). If you have questions about a medical condition or this instruction, always ask your healthcare professional. [place] [address] any warranty or liability for your use of this information. Introducing [facility]! Clint Garnica introduces Nimbula patient portal. Now you can access parts of your medical record, email your doctor's office, and request medication refills online. 1. In your internet browser, go to [email or web address] 2. Click on the First Time User? Click Here link in the Sign In box. You will see the New Member Sign Up page. 3. Enter your Nimbula Access Code exactly as it appears below. You will not need to use this code after youve completed the sign-up process. If you do not sign up before the expiration date, you must request a new code. · Chunyu Access Code: [email or web address] Expires: 12/3/2018  8:18 AM 
 
4. Enter the last four digits of your Social Security Number (xxxx) and Date of Birth (mm/dd/yyyy) as indicated and click Submit. You will be taken to the next sign-up page. 5. Create a Chunyu ID. This will be your Chunyu login ID and cannot be changed, so think of one that is secure and easy to remember. 6. Create a Chunyu password. You can change your password at any time. 7. Enter your Password Reset Question and Answer. This can be used at a later time if you forget your password. 8. Enter your e-mail address. You will receive e-mail notification when new information is available in [address] 9. Click Sign Up. You can now view and download portions of your medical record. 10. Click the Download Summary menu link to download a portable copy of your medical information. If you have questions, please visit the Frequently Asked Questions section of the Chunyu website. Remember, Chunyu is NOT to be used for urgent needs. For medical emergencies, dial 911. Now available from your iPhone and Android! Please provide this summary of care documentation to your next provider. Your primary care clinician is listed as NELLY CASTILLO. If you have any questions after today's visit, please call [phone].

## 2018-09-04 NOTE — PROGRESS NOTES
Chief Complaint Patient presents with  Physical  
 Joint Pain  
  left knee and left hip  Umbilical Hernia S: Jose R De La Garza is a 46 y.o. male who presents for wellness physical.  
 
DM type 2- managed by endocrine Dr Violeta Cruz. He Is seen by her every three months, last seen end of May and will see next week. She also manages his hypogonadism. Last HgbA1c was 6.6 on 5/29/18, he also had fasting labs done at that time, including testosterone and PSA levels. PSA normal.   
Blood sugars at home usually average 110 fasting. Had one low blood sugar in the middle of the night last week, says that his reading was 45 which he says was the lowest he has ever seen. He is unsure what caused this low blood sugar, and says that his blood sugar recovered quickly after eating something. Says that he has issues of \"vertigo\" (which is self diagnosed)  but describes these episodes as brief moments where he \"loses balance or my footing\". He does however, say that he had a syncopal episode about a month ago while walking in his home. No one was there to observe this syncopal event. He did check blood sugar and it was not low that day but was higher around 200 that day. He says that he did not hit his head and no there was no injury and did not get seen for this episode. Syncope was only once and that was the only time he has ever passed out. He says that he was only out of it for \"a second\" and is uncertain why he passed out. When he \"came to\" he says that he felt fine; denied nausea, vomiting, dizziness, chest pain, palpitations, SOB or diaphoresis prior to the syncopal event or after the syncopal event. He describes that the other \"vertigo\"episodes have been strictly like he loses his balance in his feet, and wonders if this is neuropathy related. He says that these balance issues resolve instantly, last only a \"split second\" and he never feels disoriented or dizzy.   He denies any history of a head injury or seizures in the past.  He denies unusual headaches or vision changes. He denies any other paralysis or neurological deficits. Complains of intermittent left outer posterior knee pain. He says that he cannot squat down due to pain/weakness in left leg at the lateral knee more posterior and says that the pain is minimal but the leg \"just does not work like it is suppose to\", worse when trying to stand up. No pain with stairs. He denies any past injury or trauma to that knee, but does recall that he often has hamstring issues (tightness in his hamstring/strain of hamstring in the past). He has not seen orthopedics for this pain nor has he tried any stretches or therapy at home. He would prefer to not see orthopedics yet, he would like to work on stretching for now. Umbilical hernia:  Has had for the past couple years, worsened/bigger over the past couple weeks. Says that he noticed that it became bigger after lifting something heavy a couple weeks ago. He denies any pain and says that the hernia is reducible but feels like he needs to have it looked at by a surgeon since it has become larger. He does note that he has persistently had \"bowel issues\" for multiple years. Says that he has chronic constipation, says that he has a BM about 2 times per week and when he finally goes, has a large firm formed stool with loose stool following. He does not take stool softener because he does not feel that his stool is hard. He denies any blood in his stools. He denies nausea vomiting. He says that he does tend to have a lot of gas and that his gas is \"very smelly\". He notes that certain foods will make him have to have a bowel movement, such as spicy foods. He says that he does eat adequate amount of fiber and tries to eat fairly healthy most of the time. He does drink some caffeine but tries to limit the amount of caffeine per day he drinks.   He has not had a colonoscopy for colon cancer screening yet. He is unsure of his family history as he was adopted as a child. Nutrition: Denies weight problems and eats a well balanced diet. Drinks plenty of water. Physical: Pt is active. Works 12 hour shifts, tries to be active but no formal exercise. Social: Denies any recent stressors. 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 Tobacco: Denies smoking or use of other tobacco products Alcohol: occasional alcohol use Health Maintenance Immunizations:  
  Influenza: patient declines. Tetanus: unknown. Cancer screening:  
 Prostate: up to date. Colon: not up to date - due now. Fasting lab work: UTD Last eye exam: UTD, done at [facility] office Last dental exam:  UTD Denies any systemic symptoms including fever, myalgias, chills, weakness, weight loss and fatigue. Denies respiratory symptoms including cough, SOB, or wheezing. Denies any cardiac symptoms including chest pain, tightness or discomfort. ROS is otherwise negative. Agree with nurses note. Past Medical History:  
Diagnosis Date  Diabetes ([facility] [address]  Hypercholesterolemia  Sleep apnea 2015 Past Surgical History:  
Procedure Laterality Date  VASCULAR SURGERY PROCEDURE UNLIST Social History Social History  Marital status:  Spouse name: N/A  
 Number of children: N/A  
 Years of education: N/A Occupational History  Not on file. Social History Main Topics  Smoking status: Former Smoker  Smokeless tobacco: Never Used  Alcohol use Yes Comment: social   
 Drug use: No  
 Sexual activity: Not on file Other Topics Concern  Not on file Social History Narrative Current Outpatient Prescriptions Medication Sig Dispense Refill  raNITIdine hcl 150 mg capsule Take 150 mg by mouth two (2) times a day.  naproxen (NAPROSYN) 250 mg tablet Take  by mouth two (2) times daily (with meals).  guaiFENesin-codeine (ROBITUSSIN AC) 100-10 mg/5 mL solution Take 5 mL by mouth three (3) times daily as needed for Cough. Max Daily Amount: 15 mL. 120 mL 0  
 INSULIN GLARGINE,HUM. REC. ANLOG (TOUJEO SOLOSTAR SC) by SubCUTAneous route.  metFORMIN ER 1,000 mg tr24 Take 2,000 mg by mouth.  Liraglutide (VICTOZA 2-KIYA) 0.6 mg/0.1 mL (18 mg/3 mL) sub-q pen 1.8 mg by SubCUTAneous route.  atorvastatin (LIPITOR) 40 mg tablet Take 40 mg by mouth daily.  ergocalciferol (VITAMIN D2) 50,000 unit capsule Take 50,000 Units by mouth.  vit B Cmplx 3-FA-Vit C-Biotin (NEPHRO JENARO RX) 1- mg-mg-mcg tablet Take 1 Tab by mouth daily.  TESTOSTERONE (AXIRON TD) by TransDERmal route.  albuterol (PROVENTIL HFA, VENTOLIN HFA, PROAIR HFA) 90 mcg/actuation inhaler Take 2 Puffs by inhalation every six (6) hours as needed for Wheezing. 1 Inhaler 0  
 pantoprazole (PROTONIX) 40 mg tablet Take 40 mg by mouth daily. Pt is taking all medications as prescribed without any side effects or difficulty. Allergies Allergen Reactions  Dolobid [Diflunisal] Rash  
 
  
 
 
 
 
O: VS:  
Visit Vitals  /87 (BP 1 Location: Left arm, BP Patient Position: Sitting)  Pulse (!) 102  Temp 97.9 °F (36.6 °C) (Oral)  Resp 16  
 Ht 6' 1\" (1.854 m)  Wt 262 lb (118.8 kg)  SpO2 93%  BMI 34.57 kg/m2 PAIN: No complaints of pain today. GENERAL: Jon Rosario is sitting on the table in no acute distress. Non-toxic. Well nourished. Well developed. Appropriately groomed. He is friendly, social and cooperative. HEAD:  Normocephalic. Atraumatic. Non tender sinuses x 4. EYE: PERRLA. EOMs intact. Sclera anicteric without injection. No drainage or discharge. EARS: Hearing intact bilaterally. External ear canals normal without evidence of blood or swelling. Bilateral TM's intact, pearly grey with landmarks visible. No erythema or effusion. NOSE: Patent. Nasal turbinates pink. No polyps noted. No erythema. No discharge. MOUTH: mucous membranes pink and moist. Posterior pharynx normal with cobblestone appearance. No erythema, white exudate or obstruction. NECK: supple. Midline trachea. No carotid bruits noted bilaterally. No thyromegaly noted. RESP: Breath sounds are symmetrical bilaterally. Unlabored without SOB. Speaking in full sentences. Clear to auscultation bilaterally anteriorly and posteriorly. No wheezes. No rales or rhonchi. CV: normal rate. Regular rhythm. S1, S2 audible. No murmur noted. No rubs, clicks or gallops noted. ABDOMEN:  Soft and nondistended. No tenderness on palpation. No masses or organomegaly. No rebound, rigidity or guarding. Bowel sounds normal x 4 quadrants. There is a small,soft, reducible umbilical hernia. BACK: No visible deformities or curvature. Full ROM. No pain on palpation of the spinous processes in the cervical, thoracic, lumbar, sacral regions. No CVA tenderness. NEURO:  awake, alert and oriented to person, place, and time and event. Cranial nerves II through XII intact. Clear speech. Muscle strength is +5/5 x 4 extremities. Sensation is intact to light touch bilaterally. Steady gait. MUSC:  Intact x 4 extremities. Full ROM x 4 extremities. No pain with movement. HEME/LYMPH: peripheral pulses palpable 2+ x 4 extremities. No peripheral edema is noted. No cervical adenopathy noted. SKIN: clean dry and intact throughout. No rashes, erythema, ecchymosis, lacerations, abrasions, suspicious moles noted. PSYCH: appropriate behavior, dress and thought processes. Good eye contact. Clear and coherent speech. Full affect. Good insight. LEFT knee:  Full ROM, no swelling or crepitus. Some pain is noted at that Posterior LCL area when he rises from a squatting position.   
 
 
A/P:   
Differential diagnosis and treatment options reviewed with patient who is in agreement with treatment plan as outlined below. ICD-10-CM ICD-9-CM 1. Encounter for wellness examination in adult Z00.00 V70.0 2. Special screening for malignant neoplasm of colon Z12.11 V76.51 REFERRAL TO GASTROENTEROLOGY 3. Constipation, unspecified constipation type K59.00 564.00 REFERRAL TO GASTROENTEROLOGY 4. Umbilical hernia without obstruction and without gangrene K42.9 553.1 REFERRAL TO GENERAL SURGERY 5. Need for Tdap vaccination Z23 V06.1 TETANUS, DIPHTHERIA TOXOIDS AND ACELLULAR PERTUSSIS VACCINE (TDAP), IN INDIVIDS. >=7, IM  
   IA IMMUNIZ ADMIN,1 SINGLE/COMB VAC/TOXOID 6. Hypogonadism male E29.1 257.2 7. Chronic pain of left knee M25.562 719.46   
 G89.29 338.29   
8. Diabetic mononeuropathy associated with type 2 diabetes mellitus (HCC) E11.41 250.60   
  355.9 9. Balance problems R26.89 781.99 Referral to GI given-needs screening colonoscopy as well as evaluation for possible IBS symptoms. I encourage patient to eat a diet high in fiber and increase his water intake as well as following a healthy bowel regimen. He may use MiraLAX over-the-counter as needed for constipation. Discussed BMI and healthy weight. Encouraged patient to work to implement changes including diet high in raw fruits and vegetables, lean protein and good fats. Limit refined, processed carbohydrates and sugar. Encouraged regular exercise. He is followed by endocrine for his diabetes and hypogonadism. He is scheduled to see endocrine next week, will defer labs at this time because he will have labs done at her office next week. I have asked that he sign a release so that we can get a copy of all of his labs. I encouraged him to sign up for my chart and send me a message after he has his appointment with endocrinology.   I also encouraged him to discuss with her the symptoms of his low blood sugar episode, his balance/neuropathy complaints, and his complaints of random sweating. I am concerned about his complaint of syncope vs vertigo vs balance/coordination issue. I suggest that he may need to be evaluated by neurology, but he would like to review these complaints with his endocrinologist first and he will follow-up with me in about 2 weeks either by phone or as another office visit to discuss further. He is instructed on neurological signs and symptoms that would need immediate medical attention. His syncopal event was unclear/difficult to assess etiology. We will start with reviewing his blood work that he gets done at endocrine next week and go from there. Handout regarding diabetic diet was given to patient. Referral to general surgery given for further evaluation and treatment of umbilical hernia. Hernia appears stable at this time, so he will call and make this appointment, should he have any issues making this appointment he will contact our office for our assistance. Advised on nutrition, physical activity, weight management, tobacco, alcohol and safety TDAP in office today. VIS discussed and handout given in AVS. 
 
Verbal and written instructions (see AVS) provided. Patient expresses understanding and agreement of diagnosis and treatment plan.

## 2018-09-14 ENCOUNTER — OFFICE VISIT (OUTPATIENT)
Dept: SURGERY | Age: 48
End: 2018-09-14

## 2018-09-14 VITALS
OXYGEN SATURATION: 91 % | TEMPERATURE: 97.5 F | HEART RATE: 89 BPM | DIASTOLIC BLOOD PRESSURE: 79 MMHG | BODY MASS INDEX: 35.07 KG/M2 | WEIGHT: 264.6 LBS | HEIGHT: 73 IN | RESPIRATION RATE: 18 BRPM | SYSTOLIC BLOOD PRESSURE: 110 MMHG

## 2018-09-14 DIAGNOSIS — K42.9 UMBILICAL HERNIA WITHOUT OBSTRUCTION AND WITHOUT GANGRENE: Primary | ICD-10-CM

## 2018-09-14 RX ORDER — TADALAFIL 20 MG/1
20 TABLET ORAL AS NEEDED
COMMUNITY
End: 2018-11-28

## 2018-09-14 NOTE — PROGRESS NOTES
Chief Complaint   Patient presents with    Umbilical Hernia     patient referred by Iyv Dumont NP     1. Have you been to the ER, urgent care clinic since your last visit? No   Hospitalized since your last visit? No      2. Have you seen or consulted any other health care providers outside of the [facility] since your last visit?    No

## 2018-09-14 NOTE — MR AVS SNAPSHOT
[address] [facility], [address] [facility], [facility] [address] [facility] 
[phone] Patient: Zoanne Babinski MRN: [id number] TON:4[birth year]84 Visit Information Date & Time Provider Department Dept. Phone Encounter #  
 9/14/2018  9:00 AM Mellissa Stallings MD [facility] [id number] Upcoming Health Maintenance Date Due  
 EYE EXAM RETINAL OR DILATED Q1 4/21/1978 FOBT Q 1 YEAR AGE 50-75 4/21/2018 Influenza Age 5 to Adult 8/1/2018 FOOT EXAM Q1 10/4/2018* HEMOGLOBIN A1C Q6M 10/4/2018* MICROALBUMIN Q1 10/4/2018* LIPID PANEL Q1 10/4/2018* DTaP/Tdap/Td series (2 - Td) 9/4/2028 *Topic was postponed. The date shown is not the original due date. Allergies as of 9/14/2018  Review Complete On: 9/14/2018 By: Mellissa Stallings MD  
  
 Severity Noted Reaction Type Reactions Dolobid [Diflunisal]  09/03/2013   Side Effect Rash Current Immunizations  Reviewed on 12/3/2013 Name Date Influenza Vaccine 12/3/2013 Tdap 9/4/2018 Not reviewed this visit Vitals BP Pulse Temp Resp Height(growth percentile) Weight(growth percentile) 110/79 (BP 1 Location: Left arm, BP Patient Position: Sitting) 89 97.5 °F (36.4 °C) (Oral) 18 6' 1\" (1.854 m) 264 lb 9.6 oz (120 kg) SpO2 BMI Smoking Status 91% 34.91 kg/m2 Former Smoker Vitals History BMI and BSA Data Body Mass Index Body Surface Area 34.91 kg/m 2 2.49 m 2 Preferred Pharmacy Pharmacy Name Phone [facility] [address] [phone] Your Updated Medication List  
  
   
This list is accurate as of 9/14/18  9:35 AM.  Always use your most recent med list.  
  
  
  
  
 atorvastatin 40 mg tablet Commonly known as:  LIPITOR Take 40 mg by mouth daily. AXIRON TD  
by TransDERmal route. cholecalciferol (VITAMIN D3) 5,000 unit Tab tablet Commonly known as:  VITAMIN D3 Take  by mouth daily. CIALIS 20 mg tablet Generic drug:  tadalafil Take 20 mg by mouth as needed. JARDIANCE 25 mg tablet Generic drug:  empagliflozin  
  
 metFORMIN ER 1,000 mg Tr24 Take 2,000 mg by mouth. naproxen 250 mg tablet Commonly known as:  NAPROSYN Take  by mouth two (2) times daily (with meals). raNITIdine hcl 150 mg capsule Take 150 mg by mouth two (2) times a day. TOUJEO SOLOSTAR SC  
100 Units by SubCUTAneous route. VIAGRA PO Take  by mouth. VICTOZA 2-KIYA 0.6 mg/0.1 mL (18 mg/3 mL) Pnij Generic drug:  Liraglutide 1.8 mg by SubCUTAneous route. vit B Cmplx 3-FA-Vit C-Biotin 1- mg-mg-mcg tablet Commonly known as:  NEPHRO JENARO RX Take 1 Tab by mouth daily. Introducing [facility]! [facility] introduces [employer] patient portal. Now you can access parts of your medical record, email your doctor's office, and request medication refills online. 1. In your internet browser, go to [email or web address] 2. Click on the First Time User? Click Here link in the Sign In box. You will see the New Member Sign Up page. 3. Enter your [employer] Access Code exactly as it appears below. You will not need to use this code after youve completed the sign-up process. If you do not sign up before the expiration date, you must request a new code. · [employer] Access Code: [email or web address] Expires: 12/3/2018  8:18 AM 
 
4. Enter the last four digits of your Social Security Number (xxxx) and Date of Birth (mm/dd/yyyy) as indicated and click Submit. You will be taken to the next sign-up page. 5. Create a [employer] ID. This will be your [employer] login ID and cannot be changed, so think of one that is secure and easy to remember. 6. Create a [employer] password. You can change your password at any time. 7. Enter your Password Reset Question and Answer. This can be used at a later time if you forget your password. 8. Enter your e-mail address. You will receive e-mail notification when new information is available in [address] 9. Click Sign Up. You can now view and download portions of your medical record. 10. Click the Download Summary menu link to download a portable copy of your medical information. If you have questions, please visit the Frequently Asked Questions section of the [employer] website. Remember, [employer] is NOT to be used for urgent needs. For medical emergencies, dial 911. Now available from your iPhone and Android! Please provide this summary of care documentation to your next provider. Your primary care clinician is listed as NELLY CASTILLO. If you have any questions after today's visit, please call [phone].

## 2018-09-14 NOTE — PROGRESS NOTES
HISTORY OF PRESENT ILLNESS  Genoveva Franks is a 46 y.o. male who comes in for consultation by Cindy Torres NP for a hernia  HPI  He has had a hernia near the umbilical region for several years. He lifted a log recently and it got larger. It is still easy to reduce when laying flat. He denies pain, nausea or vomiting associated with it. He has not had surgery in the region previously. He has some intermittent diarrhea and constipation, but denies melena or hematochezia, dysuria or hematuria. Past Medical History:   Diagnosis Date    Diabetes ([address]     Hypercholesterolemia     Sleep apnea 2015     Past Surgical History:   Procedure Laterality Date    VASCULAR SURGERY PROCEDURE UNLIST       History reviewed. No pertinent family history. Social History   Substance Use Topics    Smoking status: Former Smoker    Smokeless tobacco: Never Used    Alcohol use Yes      Comment: social      Current Outpatient Prescriptions   Medication Sig    sildenafil citrate (VIAGRA PO) Take  by mouth.  tadalafil (CIALIS) 20 mg tablet Take 20 mg by mouth as needed.  raNITIdine hcl 150 mg capsule Take 150 mg by mouth two (2) times a day.  cholecalciferol, VITAMIN D3, (VITAMIN D3) 5,000 unit tab tablet Take  by mouth daily.  JARDIANCE 25 mg tablet     INSULIN GLARGINE,HUM. REC. ANLOG (TOUJEO SOLOSTAR SC) 100 Units by SubCUTAneous route.  metFORMIN ER 1,000 mg tr24 Take 2,000 mg by mouth.  Liraglutide (VICTOZA 2-KIYA) 0.6 mg/0.1 mL (18 mg/3 mL) sub-q pen 1.8 mg by SubCUTAneous route.  atorvastatin (LIPITOR) 40 mg tablet Take 40 mg by mouth daily.  vit B Cmplx 3-FA-Vit C-Biotin (NEPHRO JENARO RX) 1- mg-mg-mcg tablet Take 1 Tab by mouth daily.  TESTOSTERONE (AXIRON TD) by TransDERmal route.  naproxen (NAPROSYN) 250 mg tablet Take  by mouth two (2) times daily (with meals). No current facility-administered medications for this visit.       Allergies   Allergen Reactions    Dolobid [Diflunisal] Rash       Review of Systems   Constitutional: Negative for chills, diaphoresis, fever, malaise/fatigue and weight loss. HENT: Negative for congestion, ear pain and sore throat. Eyes: Negative for blurred vision and pain. Respiratory: Negative for cough, hemoptysis, sputum production, shortness of breath, wheezing and stridor. Sleep apnea   Cardiovascular: Negative for chest pain, palpitations, orthopnea, claudication, leg swelling and PND. Gastrointestinal: Positive for constipation and diarrhea. Negative for abdominal pain, blood in stool, heartburn, melena, nausea and vomiting. Genitourinary: Negative for dysuria, flank pain, frequency, hematuria and urgency. Musculoskeletal: Positive for joint pain. Negative for back pain, myalgias and neck pain. Skin: Negative for itching and rash. Neurological: Positive for sensory change (diabetic neuropathy). Negative for dizziness, tremors, focal weakness, seizures, weakness and headaches. Endo/Heme/Allergies: Negative for polydipsia. Psychiatric/Behavioral: Negative for depression and memory loss. The patient is not nervous/anxious. Visit Vitals    /79 (BP 1 Location: Left arm, BP Patient Position: Sitting)    Pulse 89    Temp 97.5 °F (36.4 °C) (Oral)    Resp 18    Ht 6' 1\" (1.854 m)    Wt 120 kg (264 lb 9.6 oz)    SpO2 91%    BMI 34.91 kg/m2       Physical Exam   Constitutional: He is oriented to person, place, and time. He appears well-developed and well-nourished. No distress. HENT:   Head: Normocephalic and atraumatic. Mouth/Throat: Oropharynx is clear and moist. No oropharyngeal exudate. Eyes: Conjunctivae and EOM are normal. Pupils are equal, round, and reactive to light. No scleral icterus. Neck: Normal range of motion. Neck supple. No tracheal deviation present. No thyromegaly present. Cardiovascular: Normal rate, regular rhythm and normal heart sounds.   Exam reveals no gallop and no friction rub.    No murmur heard. Pulmonary/Chest: Effort normal and breath sounds normal. No stridor. No respiratory distress. He has no wheezes. He has no rales. Abdominal: Soft. Normal appearance and bowel sounds are normal. He exhibits no distension, no pulsatile liver and no mass. There is no hepatosplenomegaly. There is no tenderness. There is no rebound, no guarding and no CVA tenderness. A hernia is present. Hernia confirmed positive in the ventral area (reducible umbilical hernia-small/medium). Hernia confirmed negative in the right inguinal area and confirmed negative in the left inguinal area. Genitourinary: Testes normal and penis normal. Cremasteric reflex is present. Circumcised. Musculoskeletal: Normal range of motion. He exhibits no edema or tenderness. Lymphadenopathy:     He has no cervical adenopathy. Right: No inguinal adenopathy present. Left: No inguinal adenopathy present. Neurological: He is alert and oriented to person, place, and time. No cranial nerve deficit. Coordination normal.   Skin: Skin is warm and dry. No rash noted. He is not diaphoretic. No erythema. Psychiatric: He has a normal mood and affect. His behavior is normal. Judgment and thought content normal.       ASSESSMENT and PLAN  1. Umbilical hernia. I explained about the anatomy and pathophysiology of hernias and the risk of incarceration and strangulation of the bowel. I explained about hernia repairs (open with and without mesh, and robotic assisted and laparoscopic with mesh). I explained the risks and benefits of repair including bleeding, infection, chronic pain, bowel or bladder injury, hernia recurrence, seroma, mesh infection requiring removal.  I explained it would be a six to eight week recuperation with no driving for 5 - 7 days, no lifting for six weeks. 2.  Obesity BMI 35. Recommended exercise and diet modification  3. IDDM type 2.  HgbA1c 6.8 last week per patient  4.   Diabetic neuropathy  5. Cancer screening. Never had colonoscopy. Recommended colonoscopy given age 46   Adopted and does not know family hx  6. Social hx.    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

## 2018-11-14 ENCOUNTER — OFFICE VISIT (OUTPATIENT)
Dept: FAMILY MEDICINE CLINIC | Age: 48
End: 2018-11-14

## 2018-11-14 VITALS
BODY MASS INDEX: 34.19 KG/M2 | SYSTOLIC BLOOD PRESSURE: 112 MMHG | HEART RATE: 93 BPM | HEIGHT: 73 IN | RESPIRATION RATE: 20 BRPM | OXYGEN SATURATION: 93 % | DIASTOLIC BLOOD PRESSURE: 73 MMHG | TEMPERATURE: 98 F | WEIGHT: 258 LBS

## 2018-11-14 DIAGNOSIS — K42.9 UMBILICAL HERNIA WITHOUT OBSTRUCTION AND WITHOUT GANGRENE: Primary | ICD-10-CM

## 2018-11-14 RX ORDER — POLYETHYLENE GLYCOL 3350 17 G/17G
17 POWDER, FOR SOLUTION ORAL DAILY
COMMUNITY
End: 2020-09-05

## 2018-11-14 NOTE — PROGRESS NOTES
Subjective:     Chief Complaint   Patient presents with    Umbilical Hernia     7/32 pain scale        HPI:  David Diamond is a 46 y.o. male here for complaints of intermittent  umbilical area/central abdominal pain that started yesterday afternoon while sitting at his desk. He does note that he had to wear his gun belt yesterday. He has had history of an umbilical hernia, had never had pain in that area until yesterday. He was seen by surgery in Sept. for this hernia but at that time he was not having any pain associated with the hernia and elected to observe and monitor at that time instead of having surgical intervention. He has no changes in his bowel habits. No blood in stool or urine. Scheduled for colonoscopy 11/27/18 with Dr Gertrudis Gaona. Diabetes- followed by Dr Magan Clemente at [address] [facility]. Says that blood sugars are stable. Using Limbo no touch glucose monitor. No hospital, ER or specialist visits since last primary care visit except as noted above. Past Medical History:   Diagnosis Date    Diabetes ([facility] [address]     Hypercholesterolemia     Sleep apnea 2015       Social History     Tobacco Use    Smoking status: Former Smoker    Smokeless tobacco: Never Used   Substance Use Topics    Alcohol use: Yes     Comment: social     Drug use: No       Outpatient Medications Marked as Taking for the 11/14/18 encounter (Office Visit) with Catie Smith NP   Medication Sig Dispense Refill    polyethylene glycol (MIRALAX) 17 gram/dose powder Take 17 g by mouth daily.  sildenafil citrate (VIAGRA PO) Take  by mouth.  raNITIdine hcl 150 mg capsule Take 150 mg by mouth two (2) times a day.  JARDIANCE 25 mg tablet       naproxen (NAPROSYN) 250 mg tablet Take  by mouth two (2) times daily (with meals).  INSULIN GLARGINE,HUM. REC. ANLOG (TOUJEO SOLOSTAR SC) 100 Units by SubCUTAneous route.  metFORMIN ER 1,000 mg tr24 Take 2,000 mg by mouth.       Liraglutide (VICTOZA 2-KIYA) 0.6 mg/0.1 mL (18 mg/3 mL) sub-q pen 1.8 mg by SubCUTAneous route.  atorvastatin (LIPITOR) 40 mg tablet Take 40 mg by mouth daily.  vit B Cmplx 3-FA-Vit C-Biotin (NEPHRO JENARO RX) 1- mg-mg-mcg tablet Take 1 Tab by mouth daily.  TESTOSTERONE (AXIRON TD) by TransDERmal route. Allergies   Allergen Reactions    Dolobid [Diflunisal] Rash       Health Maintenance reviewed       ROS:  Gen: no fatigue, no fever, no chills, no unexplained weight loss or weight gain  Eyes: no excessive tearing, itching, or discharge  Nose: no rhinorrhea, no sinus pain  Mouth: no oral lesions, no sore throat, no difficulty swallowing  Resp: no shortness of breath, no wheezing, no cough  CV: no chest pain, no orthopnea, no paroxysmal nocturnal dyspnea, no lower extremity edema, no palpitations  Abd: no nausea, no heartburn, no diarrhea, no constipation  Neuro: no headaches, no syncope or presyncopal episodes  Endo: no polyuria, no polydipsia. : no hematuria, no dysuria, no frequency, no incontinence  Heme: no lymphadenopathy, no easy bruising or bleeding, no night sweats  MSK: no joint pain or swelling    PE:  Visit Vitals  /73 (BP 1 Location: Left arm, BP Patient Position: Sitting)   Pulse 93   Temp 98 °F (36.7 °C) (Oral)   Resp 20   Ht 6' 1\" (1.854 m)   Wt 258 lb (117 kg)   SpO2 93%   BMI 34.04 kg/m²     Gen: alert, oriented, no acute distress  Head: normocephalic, atraumatic  Ears: external auditory canals clear, TMs without erythema or effusion  Eyes: pupils equal round reactive to light, sclera clear, conjunctiva clear  Oral: moist mucus membranes, no oral lesions, no pharyngeal inflammation or exudate  Neck: symmetric normal sized thyroid, no carotid bruits, no jugular vein distention  Resp: no increase work of breathing, lungs clear to ausculation bilaterally, no wheezing, rales or rhonchi  CV: S1, S2 normal.  No murmurs, rubs, or gallops. Abd: soft,  not distended. No hepatosplenomegaly. Normal bowel sounds. There is a umbilical hernia that is reducible when laying flat but is tender to palpate directly above the umbilical until reduced. Neuro: cranial nerves intact, normal strength and movement in all extremities, reflexes and sensation intact and symmetric. Skin: no lesion or rash  Extremities: no cyanosis or edema      Assessment/Plan:  Differential diagnosis and treatment options reviewed with patient who is in agreement with treatment plan as outlined below. ICD-10-CM ICD-9-CM    1. Umbilical hernia without obstruction and without gangrene K42.9 946.9        Umbilical hernia now with pain but no signs of obstruction at this time, his hernia is reducible when laying flat. I advised that he needs to see surgery again; will need surgical intervention since he is having pain now. He would like to call make this appointment on his own as he needs to look at his schedule. He states that he will not be able to go until at least next week. I discussed risk of incarceration and strangulation of the bowel. Should he exhibit any of these signs or symptoms he will need to go the emergency room immediately. I recommend that he avoid heavy lifting and brace umbilical area if he has to strain or engage his abdominal muscles. His diabetes is managed by his endocrinologist, Will send request for labs in recent office visit notes from his endocrinologist.    Discussed BMI and healthy weight. Encouraged patient to work to implement changes including diet high in raw fruits and vegetables, lean protein and good fats. Limit refined, processed carbohydrates and sugar. I have discussed the diagnosis with the patient and the intended plan as seen in the above orders. The patient has received an after-visit summary and questions were answered concerning future plans. I have discussed medication side effects and warnings with the patient as well.   The patient verbalizes understanding and agreement with the plan.

## 2018-11-14 NOTE — PATIENT INSTRUCTIONS
Hernia: Care Instructions  Your Care Instructions    A hernia develops when tissue bulges through a weak spot in the wall of your belly. The groin area and the navel are common areas for a hernia. A hernia can also develop near the area of a surgery you had before. Pressure from lifting, straining, or coughing can tear the weak area, causing the hernia to bulge and be painful. If you cannot push a hernia back into place, the tissue may become trapped outside the belly wall. If the hernia gets twisted and loses its blood supply, it will swell and die. This is called a strangulated hernia. It usually causes a lot of pain. It needs treatment right away. Some hernias need to be repaired to prevent a strangulated hernia. If your hernia causes symptoms or is large, you may need surgery. Follow-up care is a key part of your treatment and safety. Be sure to make and go to all appointments, and call your doctor if you are having problems. It's also a good idea to know your test results and keep a list of the medicines you take. How can you care for yourself at home? · Take care when lifting heavy objects. · Stay at a healthy weight. · Do not smoke. Smoking can cause coughing, which can cause your hernia to bulge. If you need help quitting, talk to your doctor about stop-smoking programs and medicines. These can increase your chances of quitting for good. · Talk with your doctor before wearing a corset or truss for a hernia. These devices are not recommended for treating hernias and sometimes can do more harm than good. There may be certain situations when your doctor thinks a truss would work, but these are rare. When should you call for help?   Call your doctor now or seek immediate medical care if:    · You have new or worse belly pain.     · You are vomiting.     · You cannot pass stools or gas.     · You cannot push the hernia back into place with gentle pressure when you are lying down.     · The area over the hernia turns red or becomes tender.    Watch closely for changes in your health, and be sure to contact your doctor if you have any problems. Where can you learn more? Go to http://milnid-joe.info/. Enter C129 in the search box to learn more about \"Hernia: Care Instructions. \"  Current as of: March 28, 2018  Content Version: 11.8  © 1409-2886 [employer]. Care instructions adapted under license by [employer] (which disclaims liability or warranty for this information). If you have questions about a medical condition or this instruction, always ask your healthcare professional. [place] [address] any warranty or liability for your use of this information. Abdominal Hernia Repair: Before Your Surgery  What is abdominal hernia repair surgery? An abdominal hernia repair is a type of surgery. It fixes a problem called a hernia. A hernia is a bulge under the skin in your belly. It happens when you have a weak spot in your belly muscles and a piece of your intestines or tissues pokes through your muscles. This can cause pain. You may notice the pain most when you lift something heavy. You can have a hernia near your belly button. Or it may be in a scar from an earlier surgery. To fix it, the doctor will do one of two kinds of surgery. In open surgery, the doctor makes one cut near the hernia. This cut is called an incision. In laparoscopic surgery, the doctor makes several very small incisions and uses a thin, lighted scope and small tools. In either type of surgery, the doctor pushes the bulge back in place, if needed. Then the doctor sews the healthy tissue back together. Often the doctor patches the weak spot with a piece of material.  Laparoscopic surgery leaves several small scars. Open surgery leaves one long scar. The scars fade with time. You will probably need to take 1 to 2 weeks off from work.  But if your job requires heavy lifting or other physical work, you may need to take 4 to 6 weeks off. Follow-up care is a key part of your treatment and safety. Be sure to make and go to all appointments, and call your doctor if you are having problems. It's also a good idea to know your test results and keep a list of the medicines you take. What happens before surgery?   Surgery can be stressful. This information will help you understand what you can expect. And it will help you safely prepare for surgery.   Preparing for surgery    · Understand exactly what surgery is planned, along with the risks, benefits, and other options. · Tell your doctors ALL the medicines, vitamins, supplements, and herbal remedies you take. Some of these can increase the risk of bleeding or interact with anesthesia.     · If you take blood thinners, such as warfarin (Coumadin), clopidogrel (Plavix), or aspirin, be sure to talk to your doctor. He or she will tell you if you should stop taking these medicines before your surgery. Make sure that you understand exactly what your doctor wants you to do.     · Your doctor will tell you which medicines to take or stop before your surgery. You may need to stop taking certain medicines a week or more before surgery. So talk to your doctor as soon as you can.     · If you have an advance directive, let your doctor know. It may include a living will and a durable power of  for health care. Bring a copy to the hospital. If you don't have one, you may want to prepare one. It lets your doctor and loved ones know your health care wishes. Doctors advise that everyone prepare these papers before any type of surgery or procedure. What happens on the day of surgery? · Follow the instructions exactly about when to stop eating and drinking. If you don't, your surgery may be canceled.  If your doctor told you to take your medicines on the day of surgery, take them with only a sip of water.     · Take a bath or shower before you come in for your surgery. Do not apply lotions, perfumes, deodorants, or nail polish.     · Do not shave the surgical site yourself.     · Take off all jewelry and piercings. And take out contact lenses, if you wear them.    At the hospital or surgery center   · Bring a picture ID.     · The area for surgery is often marked to make sure there are no errors.     · You will be kept comfortable and safe by your anesthesia provider. You will be asleep during the surgery.     · The surgery will take 30 minutes to 2 hours. It depends on how large the hernia is and where it is. Going home   · Be sure you have someone to drive you home. Anesthesia and pain medicine make it unsafe for you to drive.     · You will be given more specific instructions about recovering from your surgery. They will cover things like diet, wound care, follow-up care, driving, and getting back to your normal routine. When should you call your doctor? · You have questions or concerns.     · You don't understand how to prepare for your surgery.     · You become ill before the surgery (such as fever, flu, or a cold).     · You need to reschedule or have changed your mind about having the surgery. Where can you learn more? Go to http://milind-joe.info/. Enter U288 in the search box to learn more about \"Abdominal Hernia Repair: Before Your Surgery. \"  Current as of: March 28, 2018  Content Version: 11.8  © 3635-3889 [employer]. Care instructions adapted under license by [employer] (which disclaims liability or warranty for this information). If you have questions about a medical condition or this instruction, always ask your healthcare professional. [address] any warranty or liability for your use of this information.

## 2018-11-14 NOTE — PROGRESS NOTES
Chief Complaint   Patient presents with    Umbilical Hernia     8/98 pain scale     1. Have you been to the ER, urgent care clinic since your last visit? Hospitalized since your last visit? No    2. Have you seen or consulted any other health care providers outside of the [address] [place] [address] since your last visit? Include any pap smears or colon screening. Yes When: Pt went to the general surgeon at [facility] and dr. Carmencita Love is scheduled for a colonoscopy 11/27/18.

## 2018-11-21 ENCOUNTER — HOSPITAL ENCOUNTER (OUTPATIENT)
Dept: GENERAL RADIOLOGY | Age: 48
Discharge: HOME | End: 2018-11-21
Payer: COMMERCIAL

## 2018-11-21 DIAGNOSIS — R52 PAIN: ICD-10-CM

## 2018-11-21 PROCEDURE — 74018 RADEX ABDOMEN 1 VIEW: CPT

## 2018-11-28 ENCOUNTER — HOSPITAL ENCOUNTER (OUTPATIENT)
Dept: PREADMISSION TESTING | Age: 48
Discharge: HOME | End: 2018-11-28
Attending: SURGERY
Payer: COMMERCIAL

## 2018-11-28 VITALS
OXYGEN SATURATION: 96 % | DIASTOLIC BLOOD PRESSURE: 82 MMHG | BODY MASS INDEX: 34.16 KG/M2 | SYSTOLIC BLOOD PRESSURE: 129 MMHG | HEIGHT: 73 IN | TEMPERATURE: 98.2 F | WEIGHT: 257.72 LBS | RESPIRATION RATE: 16 BRPM

## 2018-11-28 LAB
ANION GAP SERPL CALC-SCNC: 5 MMOL/L (ref 5–15)
APPEARANCE UR: CLEAR
BACTERIA URNS QL MICRO: NEGATIVE /HPF
BILIRUB UR QL: NEGATIVE
BUN SERPL-MCNC: 14 MG/DL (ref 6–20)
BUN/CREAT SERPL: 13 (ref 12–20)
CALCIUM SERPL-MCNC: 8.9 MG/DL (ref 8.5–10.1)
CHLORIDE SERPL-SCNC: 108 MMOL/L (ref 97–108)
CO2 SERPL-SCNC: 27 MMOL/L (ref 21–32)
COLOR UR: ABNORMAL
CREAT SERPL-MCNC: 1.04 MG/DL (ref 0.7–1.3)
EPITH CASTS URNS QL MICRO: ABNORMAL /LPF
ERYTHROCYTE [DISTWIDTH] IN BLOOD BY AUTOMATED COUNT: 13.7 % (ref 11.5–14.5)
GLUCOSE SERPL-MCNC: 76 MG/DL (ref 65–100)
GLUCOSE UR STRIP.AUTO-MCNC: >1000 MG/DL
HCT VFR BLD AUTO: 49.3 % (ref 36.6–50.3)
HGB BLD-MCNC: 16.1 G/DL (ref 12.1–17)
HGB UR QL STRIP: NEGATIVE
HYALINE CASTS URNS QL MICRO: ABNORMAL /LPF (ref 0–5)
KETONES UR QL STRIP.AUTO: NEGATIVE MG/DL
LEUKOCYTE ESTERASE UR QL STRIP.AUTO: NEGATIVE
MCH RBC QN AUTO: 28.8 PG (ref 26–34)
MCHC RBC AUTO-ENTMCNC: 32.7 G/DL (ref 30–36.5)
MCV RBC AUTO: 88.2 FL (ref 80–99)
NITRITE UR QL STRIP.AUTO: NEGATIVE
NRBC # BLD: 0 K/UL (ref 0–0.01)
NRBC BLD-RTO: 0 PER 100 WBC
PH UR STRIP: 5.5 [PH] (ref 5–8)
PLATELET # BLD AUTO: 206 K/UL (ref 150–400)
PMV BLD AUTO: 10.8 FL (ref 8.9–12.9)
POTASSIUM SERPL-SCNC: 4.2 MMOL/L (ref 3.5–5.1)
PROT UR STRIP-MCNC: NEGATIVE MG/DL
RBC # BLD AUTO: 5.59 M/UL (ref 4.1–5.7)
RBC #/AREA URNS HPF: ABNORMAL /HPF (ref 0–5)
SODIUM SERPL-SCNC: 140 MMOL/L (ref 136–145)
SP GR UR REFRACTOMETRY: 1.03 (ref 1–1.03)
UA: UC IF INDICATED,UAUC: ABNORMAL
UROBILINOGEN UR QL STRIP.AUTO: 0.2 EU/DL (ref 0.2–1)
WBC # BLD AUTO: 7.6 K/UL (ref 4.1–11.1)
WBC URNS QL MICRO: ABNORMAL /HPF (ref 0–4)

## 2018-11-28 PROCEDURE — 81001 URINALYSIS AUTO W/SCOPE: CPT

## 2018-11-28 PROCEDURE — 36415 COLL VENOUS BLD VENIPUNCTURE: CPT

## 2018-11-28 PROCEDURE — 80048 BASIC METABOLIC PNL TOTAL CA: CPT

## 2018-11-28 PROCEDURE — 85027 COMPLETE CBC AUTOMATED: CPT

## 2018-11-28 PROCEDURE — 93005 ELECTROCARDIOGRAM TRACING: CPT

## 2018-11-28 RX ORDER — TESTOSTERONE 30 MG/1.5ML
SOLUTION TOPICAL
COMMUNITY
End: 2020-09-05

## 2018-11-28 RX ORDER — NAPROXEN SODIUM 220 MG
220 TABLET ORAL
COMMUNITY

## 2018-11-28 NOTE — PERIOP NOTES
[facility]  Preoperative Instructions        Surgery Date 12/06/2018          Time of Arrival 1000 am Contact # [phone]    1. On the day of your surgery, please report to the Surgical Services Registration Desk and sign in at your designated time. The Surgery Center is located to the right of the Emergency Room. 2. You must have someone with you to drive you home. You should not drive a car for 24 hours following surgery. Please make arrangements for a friend or family member to stay with you for the first 24 hours after your surgery. 3. Do not have anything to eat or drink (including water, gum, mints, coffee, juice) after midnight ?? .? This may not apply to medications prescribed by your physician. ?(Please note below the special instructions with medications to take the morning of your procedure.)    4. We recommend you do not drink any alcoholic beverages for 24 hours before and after your surgery. 5. Contact your surgeons office for instructions on the following medications: non-steroidal anti-inflammatory drugs (i.e. Advil, Aleve), vitamins, and supplements. (Some surgeons will want you to stop these medications prior to surgery and others may allow you to take them)  **If you are currently taking Plavix, Coumadin, Aspirin and/or other blood-thinning agents, contact your surgeon for instructions. ** Your surgeon will partner with the physician prescribing these medications to determine if it is safe to stop or if you need to continue taking. Please do not stop taking these medications without instructions from your surgeon    6. Wear comfortable clothes. Wear glasses instead of contacts. Do not bring any money or jewelry. Please bring picture ID, insurance card, and any prearranged co-payment or hospital payment. Do not wear make-up, particularly mascara the morning of your surgery. Do not wear nail polish, particularly if you are having foot /hand surgery. Wear your hair loose or down, no ponytails, buns, michele pins or clips. All body piercings must be removed. Please shower with antibacterial soap for three consecutive days before and on the morning of surgery, but do not apply any lotions, powders or deodorants after the shower on the day of surgery. Please use a fresh towels after each shower. Please sleep in clean clothes and change bed linens the night before surgery. Please do not shave for 48 hours prior to surgery. Shaving of the face is acceptable. 7. You should understand that if you do not follow these instructions your surgery may be cancelled. If your physical condition changes (I.e. fever, cold or flu) please contact your surgeon as soon as possible. 8. It is important that you be on time. If a situation occurs where you may be late, please call [phone] (OR Holding Area). 9. If you have any questions and or problems, please call [phone] (Pre-admission Testing). 10. Your surgery time may be subject to change. You will receive a phone call the evening prior if your time changes. 11.  If having outpatient surgery, you must have someone to drive you here, stay with you during the duration of your stay, and to drive you home at time of discharge. 12.   In an effort to improve the efficiency, privacy, and safety for all of our Pre-op patients visitors are not allowed in the Holding area. Once you arrive and are registered your family/visitors will be asked to remain in the waiting room. The Pre-op staff will get you from the Surgical Waiting Area and will explain to you and your family/visitors that the Pre-op phase is beginning. The staff will answer any questions and provide instructions for tracking of the patient, by use of the existing tracking number and color-coded status board in the waiting room.   At this time the staff will also ask for your designated spokesperson information in the event that the physician or staff need to provide an update or obtain any pertinent information. The designated spokesperson will be notified if the physician needs to speak to family during the pre-operative phase. If at any time your family/visitors has questions or concerns they may approach the volunteer desk in the waiting area for assistance. Special Instructions:    MEDICATIONS TO TAKE THE MORNING OF SURGERY WITH A SIP OF WATER:atorvastatin, ranitidine      I understand a pre-operative phone call will be made to verify my surgery time. In the event that I am not available, I give permission for a message to be left on my answering service and/or with another person?   yes         ___________________      __________   _________    (Signature of Patient)             (Witness)                (Date and Time)

## 2018-11-29 LAB
ATRIAL RATE: 85 BPM
CALCULATED P AXIS, ECG09: 18 DEGREES
CALCULATED R AXIS, ECG10: -9 DEGREES
CALCULATED T AXIS, ECG11: 29 DEGREES
DIAGNOSIS, 93000: NORMAL
P-R INTERVAL, ECG05: 138 MS
Q-T INTERVAL, ECG07: 374 MS
QRS DURATION, ECG06: 100 MS
QTC CALCULATION (BEZET), ECG08: 445 MS
VENTRICULAR RATE, ECG03: 85 BPM

## 2018-12-05 ENCOUNTER — ANESTHESIA EVENT (OUTPATIENT)
Dept: SURGERY | Age: 48
End: 2018-12-05
Payer: COMMERCIAL

## 2018-12-06 ENCOUNTER — ANESTHESIA (OUTPATIENT)
Dept: SURGERY | Age: 48
End: 2018-12-06
Payer: COMMERCIAL

## 2018-12-06 ENCOUNTER — HOSPITAL ENCOUNTER (OUTPATIENT)
Age: 48
Setting detail: OUTPATIENT SURGERY
Discharge: HOME | End: 2018-12-06
Attending: SURGERY | Admitting: SURGERY
Payer: COMMERCIAL

## 2018-12-06 VITALS
RESPIRATION RATE: 18 BRPM | DIASTOLIC BLOOD PRESSURE: 84 MMHG | WEIGHT: 257.5 LBS | SYSTOLIC BLOOD PRESSURE: 143 MMHG | TEMPERATURE: 97.9 F | BODY MASS INDEX: 34.13 KG/M2 | HEIGHT: 73 IN | OXYGEN SATURATION: 93 % | HEART RATE: 86 BPM

## 2018-12-06 DIAGNOSIS — K42.9 UMBILICAL HERNIA WITHOUT OBSTRUCTION AND WITHOUT GANGRENE: Primary | ICD-10-CM

## 2018-12-06 LAB
GLUCOSE BLD STRIP.AUTO-MCNC: 113 MG/DL (ref 65–100)
GLUCOSE BLD STRIP.AUTO-MCNC: 140 MG/DL (ref 65–100)
SERVICE CMNT-IMP: ABNORMAL
SERVICE CMNT-IMP: ABNORMAL

## 2018-12-06 PROCEDURE — 76010000934 HC OR TIME 1 TO 1.5HR INTENSV - TIER 2: Performed by: SURGERY

## 2018-12-06 PROCEDURE — 74011250636 HC RX REV CODE- 250/636: Performed by: SURGERY

## 2018-12-06 PROCEDURE — 77030020782 HC GWN BAIR PAWS FLX 3M -B

## 2018-12-06 PROCEDURE — 74011250636 HC RX REV CODE- 250/636: Performed by: ANESTHESIOLOGY

## 2018-12-06 PROCEDURE — 77030031139 HC SUT VCRL2 J&J -A: Performed by: SURGERY

## 2018-12-06 PROCEDURE — 77030022704 HC SUT VLOC COVD -B: Performed by: SURGERY

## 2018-12-06 PROCEDURE — 77030016151 HC PROTCTR LNS DFOG COVD -B: Performed by: SURGERY

## 2018-12-06 PROCEDURE — 77030027138 HC INCENT SPIROMETER -A

## 2018-12-06 PROCEDURE — 77030008684 HC TU ET CUF COVD -B: Performed by: ANESTHESIOLOGY

## 2018-12-06 PROCEDURE — 74011000250 HC RX REV CODE- 250: Performed by: SURGERY

## 2018-12-06 PROCEDURE — 77030020703 HC SEAL CANN DISP INTU -B: Performed by: SURGERY

## 2018-12-06 PROCEDURE — 77030026438 HC STYL ET INTUB CARD -A: Performed by: ANESTHESIOLOGY

## 2018-12-06 PROCEDURE — 77030039266 HC ADH SKN EXOFIN S2SG -A: Performed by: SURGERY

## 2018-12-06 PROCEDURE — C1781 MESH (IMPLANTABLE): HCPCS | Performed by: SURGERY

## 2018-12-06 PROCEDURE — 77030035277 HC OBTRTR BLDELSS DISP INTU -B: Performed by: SURGERY

## 2018-12-06 PROCEDURE — 74011250636 HC RX REV CODE- 250/636

## 2018-12-06 PROCEDURE — 77030011640 HC PAD GRND REM COVD -A: Performed by: SURGERY

## 2018-12-06 PROCEDURE — 77030018846 HC SOL IRR STRL H20 ICUM -A: Performed by: SURGERY

## 2018-12-06 PROCEDURE — 76210000006 HC OR PH I REC 0.5 TO 1 HR: Performed by: SURGERY

## 2018-12-06 PROCEDURE — 77030008771 HC TU NG SALEM SUMP -A: Performed by: ANESTHESIOLOGY

## 2018-12-06 PROCEDURE — 77030019908 HC STETH ESOPH SIMS -A: Performed by: ANESTHESIOLOGY

## 2018-12-06 PROCEDURE — 74011250637 HC RX REV CODE- 250/637: Performed by: SURGERY

## 2018-12-06 PROCEDURE — 74011000250 HC RX REV CODE- 250

## 2018-12-06 PROCEDURE — 76060000033 HC ANESTHESIA 1 TO 1.5 HR: Performed by: SURGERY

## 2018-12-06 PROCEDURE — 77030036554: Performed by: SURGERY

## 2018-12-06 PROCEDURE — 76210000021 HC REC RM PH II 0.5 TO 1 HR: Performed by: SURGERY

## 2018-12-06 PROCEDURE — 77030032490 HC SLV COMPR SCD KNE COVD -B: Performed by: SURGERY

## 2018-12-06 PROCEDURE — 82962 GLUCOSE BLOOD TEST: CPT

## 2018-12-06 DEVICE — VENTRALIGHT ST MESH WITH ECHO PS POSITIONING SYSTEM, 4.5" (11.4 CM), CIRCLE
Type: IMPLANTABLE DEVICE | Site: ABDOMEN | Status: FUNCTIONAL
Brand: VENTRALIGHT

## 2018-12-06 RX ORDER — MIDAZOLAM HYDROCHLORIDE 1 MG/ML
1 INJECTION, SOLUTION INTRAMUSCULAR; INTRAVENOUS AS NEEDED
Status: DISCONTINUED | OUTPATIENT
Start: 2018-12-06 | End: 2018-12-06 | Stop reason: HOSPADM

## 2018-12-06 RX ORDER — LIDOCAINE HYDROCHLORIDE 20 MG/ML
INJECTION, SOLUTION EPIDURAL; INFILTRATION; INTRACAUDAL; PERINEURAL AS NEEDED
Status: DISCONTINUED | OUTPATIENT
Start: 2018-12-06 | End: 2018-12-06 | Stop reason: HOSPADM

## 2018-12-06 RX ORDER — HYDROMORPHONE HYDROCHLORIDE 1 MG/ML
0.2 INJECTION, SOLUTION INTRAMUSCULAR; INTRAVENOUS; SUBCUTANEOUS
Status: DISCONTINUED | OUTPATIENT
Start: 2018-12-06 | End: 2018-12-06 | Stop reason: HOSPADM

## 2018-12-06 RX ORDER — FENTANYL CITRATE 50 UG/ML
25 INJECTION, SOLUTION INTRAMUSCULAR; INTRAVENOUS
Status: DISCONTINUED | OUTPATIENT
Start: 2018-12-06 | End: 2018-12-06 | Stop reason: HOSPADM

## 2018-12-06 RX ORDER — ACETAMINOPHEN 10 MG/ML
INJECTION, SOLUTION INTRAVENOUS AS NEEDED
Status: DISCONTINUED | OUTPATIENT
Start: 2018-12-06 | End: 2018-12-06 | Stop reason: HOSPADM

## 2018-12-06 RX ORDER — FENTANYL CITRATE 50 UG/ML
INJECTION, SOLUTION INTRAMUSCULAR; INTRAVENOUS AS NEEDED
Status: DISCONTINUED | OUTPATIENT
Start: 2018-12-06 | End: 2018-12-06 | Stop reason: HOSPADM

## 2018-12-06 RX ORDER — SUCCINYLCHOLINE CHLORIDE 20 MG/ML
INJECTION INTRAMUSCULAR; INTRAVENOUS AS NEEDED
Status: DISCONTINUED | OUTPATIENT
Start: 2018-12-06 | End: 2018-12-06 | Stop reason: HOSPADM

## 2018-12-06 RX ORDER — BUPIVACAINE HYDROCHLORIDE AND EPINEPHRINE 5; 5 MG/ML; UG/ML
INJECTION, SOLUTION EPIDURAL; INTRACAUDAL; PERINEURAL AS NEEDED
Status: DISCONTINUED | OUTPATIENT
Start: 2018-12-06 | End: 2018-12-06 | Stop reason: HOSPADM

## 2018-12-06 RX ORDER — KETOROLAC TROMETHAMINE 30 MG/ML
INJECTION, SOLUTION INTRAMUSCULAR; INTRAVENOUS AS NEEDED
Status: DISCONTINUED | OUTPATIENT
Start: 2018-12-06 | End: 2018-12-06 | Stop reason: HOSPADM

## 2018-12-06 RX ORDER — OXYCODONE AND ACETAMINOPHEN 5; 325 MG/1; MG/1
1 TABLET ORAL
Qty: 20 TAB | Refills: 0 | Status: SHIPPED | OUTPATIENT
Start: 2018-12-06 | End: 2018-12-11

## 2018-12-06 RX ORDER — LIDOCAINE HYDROCHLORIDE 10 MG/ML
0.1 INJECTION, SOLUTION EPIDURAL; INFILTRATION; INTRACAUDAL; PERINEURAL AS NEEDED
Status: DISCONTINUED | OUTPATIENT
Start: 2018-12-06 | End: 2018-12-06 | Stop reason: HOSPADM

## 2018-12-06 RX ORDER — ONDANSETRON 2 MG/ML
INJECTION INTRAMUSCULAR; INTRAVENOUS AS NEEDED
Status: DISCONTINUED | OUTPATIENT
Start: 2018-12-06 | End: 2018-12-06 | Stop reason: HOSPADM

## 2018-12-06 RX ORDER — SODIUM CHLORIDE, SODIUM LACTATE, POTASSIUM CHLORIDE, CALCIUM CHLORIDE 600; 310; 30; 20 MG/100ML; MG/100ML; MG/100ML; MG/100ML
125 INJECTION, SOLUTION INTRAVENOUS CONTINUOUS
Status: DISCONTINUED | OUTPATIENT
Start: 2018-12-06 | End: 2018-12-06 | Stop reason: HOSPADM

## 2018-12-06 RX ORDER — FENTANYL CITRATE 50 UG/ML
50 INJECTION, SOLUTION INTRAMUSCULAR; INTRAVENOUS AS NEEDED
Status: DISCONTINUED | OUTPATIENT
Start: 2018-12-06 | End: 2018-12-06 | Stop reason: HOSPADM

## 2018-12-06 RX ORDER — IBUPROFEN 800 MG/1
800 TABLET ORAL
Qty: 30 TAB | Refills: 0 | Status: SHIPPED | OUTPATIENT
Start: 2018-12-06 | End: 2018-12-11

## 2018-12-06 RX ORDER — SODIUM CHLORIDE 9 MG/ML
25 INJECTION, SOLUTION INTRAVENOUS CONTINUOUS
Status: DISCONTINUED | OUTPATIENT
Start: 2018-12-06 | End: 2018-12-06 | Stop reason: HOSPADM

## 2018-12-06 RX ORDER — ROCURONIUM BROMIDE 10 MG/ML
INJECTION, SOLUTION INTRAVENOUS AS NEEDED
Status: DISCONTINUED | OUTPATIENT
Start: 2018-12-06 | End: 2018-12-06 | Stop reason: HOSPADM

## 2018-12-06 RX ORDER — MIDAZOLAM HYDROCHLORIDE 1 MG/ML
0.5 INJECTION, SOLUTION INTRAMUSCULAR; INTRAVENOUS
Status: DISCONTINUED | OUTPATIENT
Start: 2018-12-06 | End: 2018-12-06 | Stop reason: HOSPADM

## 2018-12-06 RX ORDER — OXYCODONE AND ACETAMINOPHEN 5; 325 MG/1; MG/1
1 TABLET ORAL AS NEEDED
Status: DISCONTINUED | OUTPATIENT
Start: 2018-12-06 | End: 2018-12-06 | Stop reason: HOSPADM

## 2018-12-06 RX ORDER — SODIUM CHLORIDE 0.9 % (FLUSH) 0.9 %
5-10 SYRINGE (ML) INJECTION AS NEEDED
Status: DISCONTINUED | OUTPATIENT
Start: 2018-12-06 | End: 2018-12-06 | Stop reason: HOSPADM

## 2018-12-06 RX ORDER — PROPOFOL 10 MG/ML
INJECTION, EMULSION INTRAVENOUS AS NEEDED
Status: DISCONTINUED | OUTPATIENT
Start: 2018-12-06 | End: 2018-12-06 | Stop reason: HOSPADM

## 2018-12-06 RX ORDER — DEXAMETHASONE SODIUM PHOSPHATE 4 MG/ML
INJECTION, SOLUTION INTRA-ARTICULAR; INTRALESIONAL; INTRAMUSCULAR; INTRAVENOUS; SOFT TISSUE AS NEEDED
Status: DISCONTINUED | OUTPATIENT
Start: 2018-12-06 | End: 2018-12-06 | Stop reason: HOSPADM

## 2018-12-06 RX ORDER — CEFAZOLIN SODIUM/WATER 2 G/20 ML
2 SYRINGE (ML) INTRAVENOUS ONCE
Status: DISCONTINUED | OUTPATIENT
Start: 2018-12-06 | End: 2018-12-06 | Stop reason: HOSPADM

## 2018-12-06 RX ORDER — ONDANSETRON 2 MG/ML
4 INJECTION INTRAMUSCULAR; INTRAVENOUS AS NEEDED
Status: DISCONTINUED | OUTPATIENT
Start: 2018-12-06 | End: 2018-12-06 | Stop reason: HOSPADM

## 2018-12-06 RX ORDER — OXYCODONE AND ACETAMINOPHEN 5; 325 MG/1; MG/1
1 TABLET ORAL ONCE
Status: COMPLETED | OUTPATIENT
Start: 2018-12-06 | End: 2018-12-06

## 2018-12-06 RX ORDER — MORPHINE SULFATE 10 MG/ML
2 INJECTION, SOLUTION INTRAMUSCULAR; INTRAVENOUS
Status: DISCONTINUED | OUTPATIENT
Start: 2018-12-06 | End: 2018-12-06 | Stop reason: HOSPADM

## 2018-12-06 RX ORDER — GLYCOPYRROLATE 0.2 MG/ML
INJECTION INTRAMUSCULAR; INTRAVENOUS AS NEEDED
Status: DISCONTINUED | OUTPATIENT
Start: 2018-12-06 | End: 2018-12-06 | Stop reason: HOSPADM

## 2018-12-06 RX ORDER — NEOSTIGMINE METHYLSULFATE 1 MG/ML
INJECTION INTRAVENOUS AS NEEDED
Status: DISCONTINUED | OUTPATIENT
Start: 2018-12-06 | End: 2018-12-06 | Stop reason: HOSPADM

## 2018-12-06 RX ORDER — AMOXICILLIN 250 MG
2 CAPSULE ORAL DAILY
Qty: 30 TAB | Refills: 0 | Status: SHIPPED | OUTPATIENT
Start: 2018-12-06 | End: 2018-12-21

## 2018-12-06 RX ORDER — MIDAZOLAM HYDROCHLORIDE 1 MG/ML
INJECTION, SOLUTION INTRAMUSCULAR; INTRAVENOUS AS NEEDED
Status: DISCONTINUED | OUTPATIENT
Start: 2018-12-06 | End: 2018-12-06 | Stop reason: HOSPADM

## 2018-12-06 RX ORDER — DIPHENHYDRAMINE HYDROCHLORIDE 50 MG/ML
12.5 INJECTION, SOLUTION INTRAMUSCULAR; INTRAVENOUS AS NEEDED
Status: DISCONTINUED | OUTPATIENT
Start: 2018-12-06 | End: 2018-12-06 | Stop reason: HOSPADM

## 2018-12-06 RX ORDER — SODIUM CHLORIDE, SODIUM LACTATE, POTASSIUM CHLORIDE, CALCIUM CHLORIDE 600; 310; 30; 20 MG/100ML; MG/100ML; MG/100ML; MG/100ML
25 INJECTION, SOLUTION INTRAVENOUS CONTINUOUS
Status: DISCONTINUED | OUTPATIENT
Start: 2018-12-06 | End: 2018-12-06 | Stop reason: HOSPADM

## 2018-12-06 RX ORDER — SODIUM CHLORIDE 0.9 % (FLUSH) 0.9 %
5-10 SYRINGE (ML) INJECTION EVERY 8 HOURS
Status: DISCONTINUED | OUTPATIENT
Start: 2018-12-06 | End: 2018-12-06 | Stop reason: HOSPADM

## 2018-12-06 RX ADMIN — ROCURONIUM BROMIDE 20 MG: 10 INJECTION, SOLUTION INTRAVENOUS at 08:05

## 2018-12-06 RX ADMIN — SODIUM CHLORIDE, SODIUM LACTATE, POTASSIUM CHLORIDE, AND CALCIUM CHLORIDE 25 ML/HR: 600; 310; 30; 20 INJECTION, SOLUTION INTRAVENOUS at 07:22

## 2018-12-06 RX ADMIN — KETOROLAC TROMETHAMINE 30 MG: 30 INJECTION, SOLUTION INTRAMUSCULAR; INTRAVENOUS at 08:46

## 2018-12-06 RX ADMIN — NEOSTIGMINE METHYLSULFATE 3 MG: 1 INJECTION INTRAVENOUS at 08:49

## 2018-12-06 RX ADMIN — OXYCODONE AND ACETAMINOPHEN 1 TABLET: 5; 325 TABLET ORAL at 09:58

## 2018-12-06 RX ADMIN — FENTANYL CITRATE 50 MCG: 50 INJECTION, SOLUTION INTRAMUSCULAR; INTRAVENOUS at 08:12

## 2018-12-06 RX ADMIN — GLYCOPYRROLATE 0.4 MG: 0.2 INJECTION INTRAMUSCULAR; INTRAVENOUS at 08:49

## 2018-12-06 RX ADMIN — LIDOCAINE HYDROCHLORIDE 80 MG: 20 INJECTION, SOLUTION EPIDURAL; INFILTRATION; INTRACAUDAL; PERINEURAL at 07:55

## 2018-12-06 RX ADMIN — Medication 2 G: at 07:46

## 2018-12-06 RX ADMIN — ACETAMINOPHEN 1000 MG: 10 INJECTION, SOLUTION INTRAVENOUS at 08:14

## 2018-12-06 RX ADMIN — DEXAMETHASONE SODIUM PHOSPHATE 10 MG: 4 INJECTION, SOLUTION INTRA-ARTICULAR; INTRALESIONAL; INTRAMUSCULAR; INTRAVENOUS; SOFT TISSUE at 08:05

## 2018-12-06 RX ADMIN — SUCCINYLCHOLINE CHLORIDE 180 MG: 20 INJECTION INTRAMUSCULAR; INTRAVENOUS at 07:55

## 2018-12-06 RX ADMIN — ONDANSETRON 4 MG: 2 INJECTION INTRAMUSCULAR; INTRAVENOUS at 08:13

## 2018-12-06 RX ADMIN — MIDAZOLAM HYDROCHLORIDE 2 MG: 1 INJECTION, SOLUTION INTRAMUSCULAR; INTRAVENOUS at 07:46

## 2018-12-06 RX ADMIN — ROCURONIUM BROMIDE 10 MG: 10 INJECTION, SOLUTION INTRAVENOUS at 07:55

## 2018-12-06 RX ADMIN — FENTANYL CITRATE 25 MCG: 50 INJECTION, SOLUTION INTRAMUSCULAR; INTRAVENOUS at 09:23

## 2018-12-06 RX ADMIN — FENTANYL CITRATE 50 MCG: 50 INJECTION, SOLUTION INTRAMUSCULAR; INTRAVENOUS at 07:55

## 2018-12-06 RX ADMIN — FENTANYL CITRATE 25 MCG: 50 INJECTION, SOLUTION INTRAMUSCULAR; INTRAVENOUS at 09:29

## 2018-12-06 RX ADMIN — PROPOFOL 300 MG: 10 INJECTION, EMULSION INTRAVENOUS at 07:55

## 2018-12-06 NOTE — ANESTHESIA POSTPROCEDURE EVALUATION
Post-Anesthesia Evaluation and Assessment Patient: Laney Llanes MRN: [id number]  SSN: [id number] YOB: 1970  Age: 46 y.o. Sex: male I have evaluated the patient and they are stable and ready for discharge from the PACU. Cardiovascular Function/Vital Signs Visit Vitals /85 Pulse 72 Temp 36.6 °C (97.8 °F) Resp 15 Ht 6' 1\" (1.854 m) Wt 116.8 kg (257 lb 8 oz) SpO2 95% BMI 33.97 kg/m² Patient is status post General anesthesia for Procedure(s): 
ROBOTIC ASSISTED UMBILICAL HERNIA REPAIR WITH MESH. Nausea/Vomiting: None Postoperative hydration reviewed and adequate. Pain: 
Pain Scale 1: Numeric (0 - 10) (12/06/18 0929) Pain Intensity 1: 5 (12/06/18 0929) Managed Neurological Status:  
Neuro (WDL): Exceptions to WDL (12/06/18 0910) Neuro Neurologic State: Confused;Drowsy (12/06/18 0910) Orientation Level: Oriented to person (12/06/18 0910) Speech: Clear (12/06/18 0910) LUE Motor Response: Purposeful (12/06/18 0910) LLE Motor Response: Purposeful (12/06/18 0910) RUE Motor Response: Purposeful (12/06/18 0910) RLE Motor Response: Purposeful (12/06/18 0910) At baseline Mental Status, Level of Consciousness: Alert and  oriented to person, place, and time Pulmonary Status:  
O2 Device: Nasal cannula (12/06/18 0910) Adequate oxygenation and airway patent Complications related to anesthesia: None Post-anesthesia assessment completed. No concerns Signed By: Tello Peng MD   
 December 6, 2018 Procedure(s): 
ROBOTIC ASSISTED UMBILICAL HERNIA REPAIR WITH MESH. 
 
<BSHSIANPOST> Visit Vitals /85 Pulse 72 Temp 36.6 °C (97.8 °F) Resp 15 Ht 6' 1\" (1.854 m) Wt 116.8 kg (257 lb 8 oz) SpO2 95% BMI 33.97 kg/m²

## 2018-12-06 NOTE — PERIOP NOTES
TRANSFER - OUT REPORT:    Verbal report given to TAMICA Wolf(name) on Hernando Jm  being transferred to Phase II(unit) for routine post - op       Report consisted of patients Situation, Background, Assessment and   Recommendations(SBAR). Information from the following report(s) SBAR, Kardex, OR Summary, Procedure Summary and MAR was reviewed with the receiving nurse. Lines:   Peripheral IV 12/06/18 Left;Posterior Hand (Active)   Site Assessment Clean, dry, & intact 12/6/2018 10:00 AM   Phlebitis Assessment 0 12/6/2018 10:00 AM   Infiltration Assessment 0 12/6/2018 10:00 AM   Dressing Status Clean, dry, & intact 12/6/2018 10:00 AM   Dressing Type Transparent 12/6/2018 10:00 AM   Hub Color/Line Status Pink;Capped 12/6/2018 10:00 AM        Opportunity for questions and clarification was provided.       Patient transported with:   Registered Nurse

## 2018-12-06 NOTE — PERIOP NOTES
Discharge instructions reviewed with patient and significant other, Marcus. Opportunity for questions/answers give, able to verbalize understanding. Provided with 1 new prescription, 2 others electronically sent to pharmacy by MD and patient is aware. Denies any nausea. Pain 3-4/10. Patient slow to dress with assistance from nurse and Luis Birmingham, but tolerates. Instructed and encouraged continued use of incentive spirometer at home. Denies need to void. Tolerates PO intake. PIV removed. Escorted out for discharge home via wheelchair by nurse.

## 2018-12-06 NOTE — H&P
HISTORY OF PRESENT ILLNESS  Hernando Oneal is a 46 y.o. male who comes in for consultation by Marcelo Blount NP for a hernia  HPI  He has had a hernia near the umbilical region for several years. He lifted a log recently and it got larger. It is still easy to reduce when laying flat. He denies pain, nausea or vomiting associated with it. He has not had surgery in the region previously. He has some intermittent diarrhea and constipation, but denies melena or hematochezia, dysuria or hematuria.          Past Medical History:   Diagnosis Date    Diabetes ([address]      Hypercholesterolemia      Sleep apnea 2015            Past Surgical History:   Procedure Laterality Date    VASCULAR SURGERY PROCEDURE UNLIST          History reviewed. No pertinent family history. Social History    Substance Use Topics     Smoking status: Former Smoker     Smokeless tobacco: Never Used     Alcohol use Yes         Comment: social             Current Outpatient Prescriptions   Medication Sig    sildenafil citrate (VIAGRA PO) Take  by mouth.  tadalafil (CIALIS) 20 mg tablet Take 20 mg by mouth as needed.  raNITIdine hcl 150 mg capsule Take 150 mg by mouth two (2) times a day.  cholecalciferol, VITAMIN D3, (VITAMIN D3) 5,000 unit tab tablet Take  by mouth daily.  JARDIANCE 25 mg tablet      INSULIN GLARGINE,HUM. REC. ANLOG (TOUJEO SOLOSTAR SC) 100 Units by SubCUTAneous route.  metFORMIN ER 1,000 mg tr24 Take 2,000 mg by mouth.  Liraglutide (VICTOZA 2-KIYA) 0.6 mg/0.1 mL (18 mg/3 mL) sub-q pen 1.8 mg by SubCUTAneous route.  atorvastatin (LIPITOR) 40 mg tablet Take 40 mg by mouth daily.  vit B Cmplx 3-FA-Vit C-Biotin (NEPHRO JENARO RX) 1- mg-mg-mcg tablet Take 1 Tab by mouth daily.  TESTOSTERONE (AXIRON TD) by TransDERmal route.     naproxen (NAPROSYN) 250 mg tablet Take  by mouth two (2) times daily (with meals).      No current facility-administered medications for this visit.            Allergies Allergen Reactions    Dolobid [Diflunisal] Rash         Review of Systems   Constitutional: Negative for chills, diaphoresis, fever, malaise/fatigue and weight loss. HENT: Negative for congestion, ear pain and sore throat. Eyes: Negative for blurred vision and pain. Respiratory: Negative for cough, hemoptysis, sputum production, shortness of breath, wheezing and stridor. Sleep apnea   Cardiovascular: Negative for chest pain, palpitations, orthopnea, claudication, leg swelling and PND. Gastrointestinal: Positive for constipation and diarrhea. Negative for abdominal pain, blood in stool, heartburn, melena, nausea and vomiting. Genitourinary: Negative for dysuria, flank pain, frequency, hematuria and urgency. Musculoskeletal: Positive for joint pain. Negative for back pain, myalgias and neck pain. Skin: Negative for itching and rash. Neurological: Positive for sensory change (diabetic neuropathy). Negative for dizziness, tremors, focal weakness, seizures, weakness and headaches. Endo/Heme/Allergies: Negative for polydipsia. Psychiatric/Behavioral: Negative for depression and memory loss. The patient is not nervous/anxious.            Visit Vitals    /79 (BP 1 Location: Left arm, BP Patient Position: Sitting)    Pulse 89    Temp 97.5 °F (36.4 °C) (Oral)    Resp 18    Ht 6' 1\" (1.854 m)    Wt 120 kg (264 lb 9.6 oz)    SpO2 91%    BMI 34.91 kg/m2         Physical Exam   Constitutional: He is oriented to person, place, and time. He appears well-developed and well-nourished. No distress. HENT:   Head: Normocephalic and atraumatic. Mouth/Throat: Oropharynx is clear and moist. No oropharyngeal exudate. Eyes: Conjunctivae and EOM are normal. Pupils are equal, round, and reactive to light. No scleral icterus. Neck: Normal range of motion. Neck supple. No tracheal deviation present. No thyromegaly present. Cardiovascular: Normal rate, regular rhythm and normal heart sounds. Exam reveals no gallop and no friction rub. No murmur heard. Pulmonary/Chest: Effort normal and breath sounds normal. No stridor. No respiratory distress. He has no wheezes. He has no rales. Abdominal: Soft. Normal appearance and bowel sounds are normal. He exhibits no distension, no pulsatile liver and no mass. There is no hepatosplenomegaly. There is no tenderness. There is no rebound, no guarding and no CVA tenderness. A hernia is present. Hernia confirmed positive in the ventral area (reducible umbilical hernia-small/medium). Hernia confirmed negative in the right inguinal area and confirmed negative in the left inguinal area. Genitourinary: Testes normal and penis normal. Cremasteric reflex is present. Circumcised. Musculoskeletal: Normal range of motion. He exhibits no edema or tenderness. Lymphadenopathy:     He has no cervical adenopathy. Right: No inguinal adenopathy present. Left: No inguinal adenopathy present. Neurological: He is alert and oriented to person, place, and time. No cranial nerve deficit. Coordination normal.   Skin: Skin is warm and dry. No rash noted. He is not diaphoretic. No erythema. Psychiatric: He has a normal mood and affect. His behavior is normal. Judgment and thought content normal.         ASSESSMENT and PLAN  1. Umbilical hernia. I explained about the anatomy and pathophysiology of hernias and the risk of incarceration and strangulation of the bowel. I explained about hernia repairs (open with and without mesh, and robotic assisted and laparoscopic with mesh). I explained the risks and benefits of repair including bleeding, infection, chronic pain, bowel or bladder injury, hernia recurrence, seroma, mesh infection requiring removal.  I explained it would be a six to eight week recuperation with no driving for 5 - 7 days, no lifting for six weeks. 2.  Obesity BMI 35. Recommended exercise and diet modification  3.   IDDM type 2.  HgbA1c 6.8 last week per patient  4. Diabetic neuropathy  5. Cancer screening. Never had colonoscopy. Recommended colonoscopy given age 46   Adopted and does not know family hx  6. Social hx.    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

## 2018-12-06 NOTE — BRIEF OP NOTE
BRIEF OPERATIVE NOTE    Date of Procedure: 12/6/2018   Preoperative Diagnosis: UMBILICAL HERNIA  Postoperative Diagnosis: UMBILICAL HERNIA    Procedure(s):  ROBOTIC ASSISTED UMBILICAL HERNIA REPAIR WITH MESH  Surgeon(s) and Role:     * Christi Quintero MD - Primary         Surgical Assistant: Joshua Hernandez    Surgical Staff:  Circ-1: Jalyn Long  Scrub Tech-1: Robbie Mendez  Surg Asst-1: Steven Barrientos  Event Time In Time Out   Incision Start 0813    Incision Close       Anesthesia: General   Estimated Blood Loss: 10 ml  Specimens: * No specimens in log *   Findings: small umbilical hernia   Complications: none  Implants:   Implant Name Type Inv.  Item Serial No.  Lot No. LRB No. Used Action   MESH W/ECHO PS 11.4CM CIR -- VENTRALIGHT ORDER BY CA - SN/A  MESH W/ECHO PS 11.4CM CIR -- VENTRALIGHT ORDER BY CA N/A BARD HO [id number] N/A 1 Implanted

## 2018-12-06 NOTE — PERIOP NOTES
Handoff Report from Operating Room to PACU    Report received from [address] and [address] regarding Shaheen Santos. Surgeon(s):  Mendy Meza MD  And Procedure(s) (LRB):  ROBOTIC ASSISTED UMBILICAL HERNIA REPAIR WITH MESH (N/A)  confirmed   with allergies and dressings discussed. Anesthesia type, drugs, patient history, complications, estimated blood loss, vital signs, intake and output, and lines, reversal medications and temperature were reviewed.

## 2018-12-06 NOTE — OP NOTES
[facility]  OPERATIVE REPORT    Vinicio Call  MR#: [id number]  : 1970  ACCOUNT #: [de-identified]   DATE OF SERVICE: 2018    PREOPERATIVE DIAGNOSIS:  Umbilical hernia. POSTOPERATIVE DIAGNOSIS: Umbilical hernia. PROCEDURE PERFORMED:  Robotic-assisted umbilical hernia repair with mesh. SURGEON:  Joseph Pabon. Vanita Dietrich MD    ASSISTANT:  Vitaliy Bonds    ANESTHESIA:  General.    ESTIMATED BLOOD LOSS:  10 mL. COMPLICATIONS:  None. SPECIMENS REMOVED:  None. FINDINGS:  Umbilical hernia. IMPLANTS:  Bard Davol hernia mesh with Echo 1.4 cm round circular Ventralex ST, lot number [id number]. BRIEF HISTORY:  The patient is a 68-year-old gentleman with symptomatic umbilical hernia. Options were discussed. He understands the risks and benefits and wished to proceed. DESCRIPTION OF PROCEDURE:  The patient was taken to the operating room and placed on the operating table in supine position, underwent general anesthesia and the abdomen prepped and draped in the usual sterile fashion. After appropriate timeout and antibiotics were given, 0.5% Marcaine with epinephrine was infiltrated in skin and subcutaneous tissues in the left subcostal region laterally and a small incision was made in the left and then carefully a trocar was inserted and insufflation of 15 mmHg pressure gave visualization of the peritoneal cavity. I placed two more 8 mm trocars, one in the left lower quadrant laterally and one in the left lateral abdomen. There were some adhesions around the umbilical defect and we were able to reduce it and then electrocautery was used to incise the falciform that was extending down to the region. A piece of Ventralex ST Echo component was brought up into the field.   Then, we used a running #1 nonabsorbable V-Loc to approximate the fascia at the umbilical defect and then pulled the mesh up to the anterior abdominal wall utilizing the device and then with a running absorbable with 0 V-Loc we circumferentially secured the mesh to the perimeter of the mesh to the anterior abdominal wall, took down the Echo component and then sewed across the area transversely crossing the path to prevent dead space in the subcutaneous tissues. Once that was completed CO2 evacuated from the abdominal cavity. Trocars were removed, running 4-0 Vicryl was used to close all the incisions and Exofin dressings were applied. After operation, the needle, sponge and instrument counts were correct x2. The patient tolerated the procedure well and was extubated and brought to recovery room. JESS Richardson MD       MJCHELSY / HN  D: 12/06/2018 09:00     T: 12/06/2018 10:34  JOB #: [id number]  CC: Taras Mancini MD  CC: Emmett Ignacio NP

## 2018-12-06 NOTE — ANESTHESIA PREPROCEDURE EVALUATION
Anesthetic History No history of anesthetic complications Review of Systems / Medical History Patient summary reviewed, nursing notes reviewed and pertinent labs reviewed Pulmonary Within defined limits Sleep apnea: CPAP Neuro/Psych Within defined limits Cardiovascular Within defined limits Exercise tolerance: >4 METS 
  
GI/Hepatic/Renal 
Within defined limits GERD Endo/Other Within defined limits Diabetes: well controlled, type 1 Other Findings Physical Exam 
 
Airway Mallampati: II 
TM Distance: > 6 cm Neck ROM: normal range of motion Mouth opening: Normal 
 
 Cardiovascular Regular rate and rhythm,  S1 and S2 normal,  no murmur, click, rub, or gallop Dental 
No notable dental hx Pulmonary Breath sounds clear to auscultation Abdominal 
GI exam deferred Other Findings Anesthetic Plan ASA: 2 Anesthesia type: general 
 
 
 
 
Induction: Intravenous Anesthetic plan and risks discussed with: Patient

## 2018-12-06 NOTE — DISCHARGE INSTRUCTIONS
Discharge Instructions:  Hernia Repair    Dr. Ivone Egan    Call for appointment for follow up in 3 weeks [phone]    Activity:    Walk regularly. No lifting more than 5 to 10 pounds for 6 weeks. Light aerobic activity is okay when you feel up to it. You may resume driving in five days unless still requiring narcotics for pain. Work:    You may return to work in 1-2 weeks to light activity. No lifting more than 10 pounds for six weeks. Diet:    You may resume normal diet after 24 hours. Anesthesia and narcotics may cause nausea and vomiting. If persistent please call the office. Wound Care: You have a special dressing called Dermabond. It is okay to shower and let the water run over the incisions but do not scrub the area or soak in a tub. If you have a small amount of drainage you may place a dry bandage over the wound and change it daily. If you experience a lot of drainage, develop redness around the wound, or a fever over 101 F occurs please call the office. May use ice over incision for comfort. Wear abdominal binder at all times for four weeks and then when out of bed for additional 3 weeks. Medications:    Resume home medications as indicated on the Medical Reconciliation form. Aspirin and Coumadin can be restarted immediately if you were taking them preoperatively. If taking Plavix do not restart it until post operative day 2. Pain medications:  Non steroidal antiinflammatories seem to work best for post surgical pain. Try these first as prescribed. A narcotic prescription will also be given for breakthrough pain. Over the counter stool softeners and laxatives may be used if needed. Do not hesitate to call with questions or concerns. Narcotic-Analgesic/Acetaminophen (Percocet, Norco, Lorcet HD, Lortab 10/325) - (By mouth)   Why this medicine is used:   Relieves pain.   Contact a nurse or doctor right away if you have:  · Extreme weakness, shallow breathing, slow heartbeat  · Severe confusion, lightheadedness, dizziness, fainting  · Yellow skin or eyes, dark urine or pale stools  · Severe constipation, severe stomach pain, nausea, vomiting, loss of appetite  · Sweating or cold, clammy skin     Common side effects:  · Mild constipation, nausea, vomiting  · Sleepiness, tiredness  · Itching, rash  © 2017 [facility] Information is for End User's use only and may not be sold, redistributed or otherwise used for commercial purposes. Ibuprofen (Advil, Advil Children's, Motrin, Children's Ibuprofen) - (By mouth)   Why this medicine is used:   Treats pain and fever. This medicine is an NSAID. Contact a nurse or doctor right away if you have:  · Change in how much or how often you urinate  · Severe stomach pain, vomiting blood, bloody or black tarry stools  · Swelling in your hands, ankles, or feet; rapid weight gain     Common side effects:  · Constipation, diarrhea, gas, mild upset stomach  · Ringing in your ears, dizziness, headache  © 2017 [address] is for End User's use only and may not be sold, redistributed or otherwise used for commercial purposes. Laxative, Stimulant Combination (Kristy-Colace, Doc-Q-Lax, Doculax Plus, Senexon-S) - (By mouth)   Why this medicine is used:   Treats constipation by helping you have a bowel movement. Contact a nurse or doctor right away if you have:  · Yellow skin or eyes  · Dark urine or pale stools, vomiting, loss of appetite, stomach pain     Common side effects:  · Nausea, diarrhea, stomach cramps, bitter taste in mouth  · Urine turns a different color  © 2017 [facility] Information is for End User's use only and may not be sold, redistributed or otherwise used for commercial purposes.             How to Care for Your Wound After Its Treated With  DERMABOND* Topical Skin Adhesive  DERMABOND* Topical Skin Adhesive (2-octyl cyanoacrylate) is a sterile, liquid skin adhesive  that holds wound edges together. The film will usually remain in place for 5 to 10 days, then  naturally fall off your skin. The following will answer some of your questions and provide instructions for proper care for your  wound while it is healing:    CHECK WOUND APPEARANCE   Some swelling, redness, and pain are common with all wounds and normally will go away as the  wound heals. If swelling, redness, or pain increases or if the wound feels warm to the touch,  contact a doctor. Also contact a doctor if the wound edges reopen or separate. REPLACE BANDAGES   If your wound is bandaged, keep the bandage dry.  Replace the dressing daily until the adhesive film has fallen off or if the  bandage should become wet, unless otherwise instructed by your  physician.  When changing the dressing, do not place tape directly over the  DERMABOND adhesive film, because removing the tape later may also  remove the film. AVOID TOPICAL MEDICATIONS   Do not apply liquid or ointment medications or any other product to your wound while the  DERMABOND adhesive film is in place. These may loosen the film before your wound is healed. KEEP WOUND DRY AND PROTECTED   You may occasionally and briefly wet your wound in the shower or bath. Do not soak or scrub  your wound, do not swim, and avoid periods of heavy perspiration until the DERMABOND  adhesive has naturally fallen off. After showering or bathing, gently blot your wound dry with a  soft towel. If a protective dressing is being used, apply a fresh, dry bandage, being sure to keep  the tape off the DERMABOND adhesive film.  Apply a clean, dry bandage over the wound if necessary to protect it.  Protect your wound from injury until the skin has had sufficient time to heal.   Do not scratch, rub, or pick at the DERMABOND adhesive film. This may loosen the film before  your wound is healed.    Protect the wound from prolonged exposure to sunlight or tanning lamps while the film is in  place. If you have any questions or concerns about this product, please consult your doctor. *Trademark ©[employer] 6752DH PREVENT AN INFECTION      1. [address] YOUR HANDS     To prevent infection, good handwashing is the most important thing you or your caregiver can do.  Wash your hands with soap and water or use the hand  we gave you before you touch any wounds. 2. SHOWER     Use the antibacterial soap we gave you when you take a shower.  Shower with this soap until your wounds are healed.  To reach all areas of your body, you may need someone to help you.  Dont forget to clean your belly button with every shower. 3.  USE CLEAN SHEETS     Use freshly cleaned sheets on your bed after surgery.  To keep the surgery site clean, do not allow pets to sleep with you while your wound is still healing. 4. STOP SMOKING     Stop smoking, or at least cut back on smoking     Smoking slows your healing. 5.  CONTROL YOUR BLOOD SUGAR     High blood sugars slow wound healing. If you are diabetic, control your blood sugar levels before and after your surgery. After general anesthesia or intravenous sedation, for 24 hours or while taking prescription Narcotics:  · Limit your activities  · Do not drive and operate hazardous machinery  · Do not make important personal or business decisions  · Do  not drink alcoholic beverages  · If you have not urinated within 8 hours after discharge, please contact your surgeon on call.     Report the following to your surgeon:  · Excessive pain, swelling, redness or odor of or around the surgical area  · Temperature over 100.5  · Nausea and vomiting lasting longer than 4 hours or if unable to take medications  · Any signs of decreased circulation or nerve impairment to extremity: change in color, persistent  numbness, tingling, coldness or increase pain  · Any questions    These are general instructions for a healthy lifestyle:    No smoking/ No tobacco products/ Avoid exposure to second hand smoke  Surgeon General's Warning:  Quitting smoking now greatly reduces serious risk to your health. Obesity, smoking, and sedentary lifestyle greatly increases your risk for illness    A healthy diet, regular physical exercise & weight monitoring are important for maintaining a healthy lifestyle    You may be retaining fluid if you have a history of heart failure or if you experience any of the following symptoms:  Weight gain of 3 pounds or more overnight or 5 pounds in a week, increased swelling in our hands or feet or shortness of breath while lying flat in bed. Please call your doctor as soon as you notice any of these symptoms; do not wait until your next office visit. Recognize signs and symptoms of STROKE:  F-face looks uneven    A-arms unable to move or move unevenly    S-speech slurred or non-existent    T-time-call 911 as soon as signs and symptoms begin-DO NOT go       Back to bed or wait to see if you get better-TIME IS BRAIN. Warning Signs of HEART ATTACK   Call 911 if you have these symptoms:   Chest discomfort. Most heart attacks involve discomfort in the center of the chest that lasts more than a few minutes, or that goes away and comes back. It can feel like uncomfortable pressure, squeezing, fullness, or pain.  Discomfort in other areas of the upper body. Symptoms can include pain or discomfort in one or both arms, the back, neck, jaw, or stomach.  Shortness of breath with or without chest discomfort.  Other signs may include breaking out in a cold sweat, nausea, or lightheadedness. Don't wait more than five minutes to call 911 - MINUTES MATTER! Fast action can save your life. Calling 911 is almost always the fastest way to get lifesaving treatment. Emergency Medical Services staff can begin treatment when they arrive -- up to an hour sooner than if someone gets to the hospital by car.      The discharge information has been reviewed with the patient and caregiver. The patient and caregiver verbalized understanding. Discharge medications reviewed with the patient and caregiver and appropriate educational materials and side effects teaching were provided.

## 2018-12-12 ENCOUNTER — OFFICE VISIT (OUTPATIENT)
Dept: SURGERY | Age: 48
End: 2018-12-12

## 2018-12-12 ENCOUNTER — APPOINTMENT (OUTPATIENT)
Dept: GENERAL RADIOLOGY | Age: 48
End: 2018-12-12
Attending: SURGERY
Payer: COMMERCIAL

## 2018-12-12 ENCOUNTER — TELEPHONE (OUTPATIENT)
Dept: SURGERY | Age: 48
End: 2018-12-12

## 2018-12-12 ENCOUNTER — HOSPITAL ENCOUNTER (OUTPATIENT)
Dept: CT IMAGING | Age: 48
Discharge: HOME | End: 2018-12-12
Attending: SURGERY
Payer: COMMERCIAL

## 2018-12-12 VITALS
WEIGHT: 258 LBS | HEIGHT: 73 IN | TEMPERATURE: 97.3 F | BODY MASS INDEX: 34.19 KG/M2 | DIASTOLIC BLOOD PRESSURE: 99 MMHG | SYSTOLIC BLOOD PRESSURE: 130 MMHG | OXYGEN SATURATION: 95 % | HEART RATE: 98 BPM

## 2018-12-12 DIAGNOSIS — Z09 POSTOPERATIVE EXAMINATION: Primary | ICD-10-CM

## 2018-12-12 DIAGNOSIS — R10.31 ABDOMINAL PAIN, RLQ: ICD-10-CM

## 2018-12-12 PROCEDURE — 74177 CT ABD & PELVIS W/CONTRAST: CPT

## 2018-12-12 PROCEDURE — 74011250636 HC RX REV CODE- 250/636: Performed by: SURGERY

## 2018-12-12 PROCEDURE — 74011636320 HC RX REV CODE- 636/320: Performed by: SURGERY

## 2018-12-12 RX ORDER — SODIUM CHLORIDE 0.9 % (FLUSH) 0.9 %
10 SYRINGE (ML) INJECTION
Status: COMPLETED | OUTPATIENT
Start: 2018-12-12 | End: 2018-12-12

## 2018-12-12 RX ORDER — OXYCODONE AND ACETAMINOPHEN 5; 325 MG/1; MG/1
TABLET ORAL
COMMUNITY
End: 2020-09-05

## 2018-12-12 RX ORDER — IBUPROFEN 800 MG/1
400 TABLET ORAL AS NEEDED
COMMUNITY
End: 2020-09-05

## 2018-12-12 RX ORDER — SODIUM CHLORIDE 9 MG/ML
50 INJECTION, SOLUTION INTRAVENOUS
Status: COMPLETED | OUTPATIENT
Start: 2018-12-12 | End: 2018-12-12

## 2018-12-12 RX ADMIN — Medication 10 ML: at 17:26

## 2018-12-12 RX ADMIN — IOPAMIDOL 100 ML: 755 INJECTION, SOLUTION INTRAVENOUS at 17:26

## 2018-12-12 RX ADMIN — SODIUM CHLORIDE 50 ML/HR: 900 INJECTION, SOLUTION INTRAVENOUS at 17:26

## 2018-12-12 NOTE — PROGRESS NOTES
Surgery  Follow up  Procedure: RA umbilical hernia repair with mesh  OR date:  12/6/2018  Path:  none    S I have pain in the right lower abdomen that started 2 days ago    Visit Vitals  BP (!) 130/99 (BP 1 Location: Left arm, BP Patient Position: Sitting)   Pulse 98   Temp 97.3 °F (36.3 °C)   Ht 6' 1\" (1.854 m)   Wt 117 kg (258 lb)   SpO2 95%   BMI 34.04 kg/m²       O Incisions healing well without infection   No signs of hernia   Focal rebound/guarding in RLQ    A/P Doing fair   I am concerned that he has acute appendicitis   STAT CT A/P with iv CONTRAST    RTC depending upon ER findings    Concepcion Nye MD FACS

## 2018-12-12 NOTE — PROGRESS NOTES
Chief Complaint   Patient presents with    Surgical Follow-up     POST OP -PO UMBILICAL HERNIA REPAIR ON 12/6/18 ( TEARING PAIN UPON SIT TO STAND     1. Have you been to the ER, urgent care clinic since your last visit? Hospitalized since your last visit? YES, 12/6/18    2. Have you seen or consulted any other health care providers outside of the [address] [place] [address] since your last visit? Include any pap smears or colon screening.  NO

## 2018-12-12 NOTE — TELEPHONE ENCOUNTER
Spoke with patient complaining of  tearing abdominal  Mostly upon siting to standing. S/P umbilical hernia repair 55/73/62. Appointment made for 12/12/18 at 3:20 pm. Comprehend well.

## 2018-12-21 ENCOUNTER — OFFICE VISIT (OUTPATIENT)
Dept: SURGERY | Age: 48
End: 2018-12-21

## 2018-12-21 VITALS
WEIGHT: 254 LBS | TEMPERATURE: 97.9 F | OXYGEN SATURATION: 94 % | HEART RATE: 92 BPM | RESPIRATION RATE: 20 BRPM | SYSTOLIC BLOOD PRESSURE: 123 MMHG | HEIGHT: 73 IN | DIASTOLIC BLOOD PRESSURE: 89 MMHG | BODY MASS INDEX: 33.66 KG/M2

## 2018-12-21 DIAGNOSIS — Z09 POSTOPERATIVE EXAMINATION: Primary | ICD-10-CM

## 2018-12-21 NOTE — PROGRESS NOTES
Chief Complaint   Patient presents with    Follow-up     s/p  Robotic-assisted umbilical hernia repair with mesh. 12/06/18     1. Have you been to the ER, urgent care clinic since your last visit? Hospitalized since your last visit? No    2. Have you seen or consulted any other health care providers outside of the [address] [place] [address] since your last visit? Include any pap smears or colon screening.  No

## 2018-12-21 NOTE — PROGRESS NOTES
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

## 2018-12-21 NOTE — LETTER
NOTIFICATION OF RETURN TO WORK / SCHOOL 
 
12/21/2018 8:43 AM 
 
Mr. Kyle Cano [address] [facility] [id number] [facility] To Whom It May Concern: 
 
Kyle Cano was under the care of [address] from 12/06/2018 to present. He will be able to return to work/school on 12/27/2018 with no lifting, pushing or pulling greater than 10lb. Full duty 01/15/2019. If there are questions or concerns please have the patient contact our office.  
 
Sincerely, 
 
 
Karen Turk MD

## 2019-03-19 ENCOUNTER — DOCUMENTATION ONLY (OUTPATIENT)
Dept: SURGERY | Age: 49
End: 2019-03-19

## 2020-09-05 ENCOUNTER — OFFICE VISIT (OUTPATIENT)
Dept: URGENT CARE | Age: 50
End: 2020-09-05
Payer: COMMERCIAL

## 2020-09-05 VITALS — OXYGEN SATURATION: 94 % | TEMPERATURE: 99.2 F | RESPIRATION RATE: 18 BRPM | HEART RATE: 100 BPM

## 2020-09-05 DIAGNOSIS — Z20.822 COVID-19 RULED OUT: Primary | ICD-10-CM

## 2020-09-05 LAB
FLUAV+FLUBV AG NOSE QL IA.RAPID: NEGATIVE POS/NEG
FLUAV+FLUBV AG NOSE QL IA.RAPID: NEGATIVE POS/NEG
VALID INTERNAL CONTROL?: YES

## 2020-09-05 PROCEDURE — 87804 INFLUENZA ASSAY W/OPTIC: CPT | Performed by: FAMILY MEDICINE

## 2020-09-05 PROCEDURE — 99212 OFFICE O/P EST SF 10 MIN: CPT | Performed by: FAMILY MEDICINE

## 2020-09-05 RX ORDER — PHENTERMINE HYDROCHLORIDE 37.5 MG/1
37.5 TABLET ORAL DAILY
COMMUNITY
Start: 2019-07-15

## 2020-09-05 RX ORDER — FAMOTIDINE 20 MG/1
20 TABLET, FILM COATED ORAL 2 TIMES DAILY
COMMUNITY

## 2020-09-05 NOTE — PROGRESS NOTES
2020    Matthew Reyes (:  1970) is a 44 y.o.  male, here requesting COVID-19 testing    History of Present Illness  Fever and Cough    Visit Vitals  Pulse 100   Temp 99.2 °F (37.3 °C)   Resp 18   SpO2 94%       ASSESSMENT  Screening for COVID-19/ Viral disease    PLAN  POCT influenza testing - Negative  COVID-19 sample collected and submitted. Patient given detailed CDC instructions contained within After Visit Summary. An  electronic signature was used to authenticate this note.     --Shruthi Storey MD

## 2020-09-07 LAB — SARS-COV-2, NAA: DETECTED

## 2020-09-08 NOTE — PROGRESS NOTES
I notified patient of positive COVID-19 result. Reports decreased appetite. Has increased water intake. BS in 120's in AM. Denies SOB. Reports low back pain. Taking Naproxen and tylenol. Advised to quarantine x 10 days from sx onset. Encouraged deep breathing exercises, ambulation, stretching, heat to low back. ER if SOB, weakness.

## 2020-09-12 ENCOUNTER — APPOINTMENT (OUTPATIENT)
Dept: GENERAL RADIOLOGY | Age: 50
DRG: 177 | End: 2020-09-12
Attending: EMERGENCY MEDICINE
Payer: COMMERCIAL

## 2020-09-12 ENCOUNTER — HOSPITAL ENCOUNTER (INPATIENT)
Age: 50
LOS: 6 days | Discharge: HOME | DRG: 177 | End: 2020-09-18
Attending: EMERGENCY MEDICINE | Admitting: HOSPITALIST
Payer: COMMERCIAL

## 2020-09-12 DIAGNOSIS — J96.00 ACUTE RESPIRATORY FAILURE DUE TO COVID-19 (HCC): ICD-10-CM

## 2020-09-12 DIAGNOSIS — E66.9 OBESITY (BMI 30.0-34.9): ICD-10-CM

## 2020-09-12 DIAGNOSIS — U07.1 COVID-19 VIRUS INFECTION: ICD-10-CM

## 2020-09-12 DIAGNOSIS — R91.8 BILATERAL PULMONARY INFILTRATES ON CXR: ICD-10-CM

## 2020-09-12 DIAGNOSIS — U07.1 ACUTE RESPIRATORY FAILURE DUE TO COVID-19 (HCC): ICD-10-CM

## 2020-09-12 DIAGNOSIS — J96.01 ACUTE HYPOXEMIC RESPIRATORY FAILURE (HCC): ICD-10-CM

## 2020-09-12 DIAGNOSIS — E11.69 CONTROLLED TYPE 2 DIABETES MELLITUS WITH OTHER SPECIFIED COMPLICATION, WITHOUT LONG-TERM CURRENT USE OF INSULIN (HCC): ICD-10-CM

## 2020-09-12 DIAGNOSIS — E11.65 UNCONTROLLED TYPE 2 DIABETES MELLITUS WITH HYPERGLYCEMIA (HCC): ICD-10-CM

## 2020-09-12 DIAGNOSIS — J18.9 PNEUMONIA OF BOTH LUNGS DUE TO INFECTIOUS ORGANISM, UNSPECIFIED PART OF LUNG: ICD-10-CM

## 2020-09-12 DIAGNOSIS — R79.89 POSITIVE D DIMER: ICD-10-CM

## 2020-09-12 DIAGNOSIS — R65.10 SIRS (SYSTEMIC INFLAMMATORY RESPONSE SYNDROME) (HCC): ICD-10-CM

## 2020-09-12 DIAGNOSIS — K42.9 UMBILICAL HERNIA WITHOUT OBSTRUCTION AND WITHOUT GANGRENE: ICD-10-CM

## 2020-09-12 DIAGNOSIS — J96.01 ACUTE RESPIRATORY FAILURE WITH HYPOXIA (HCC): Primary | ICD-10-CM

## 2020-09-12 LAB
ALBUMIN SERPL-MCNC: 3 G/DL (ref 3.5–5)
ALBUMIN/GLOB SERPL: 0.6 {RATIO} (ref 1.1–2.2)
ALP SERPL-CCNC: 73 U/L (ref 45–117)
ALT SERPL-CCNC: 32 U/L (ref 12–78)
ANION GAP SERPL CALC-SCNC: 6 MMOL/L (ref 5–15)
APTT PPP: 29.3 SEC (ref 22.1–32)
AST SERPL-CCNC: 33 U/L (ref 15–37)
BASOPHILS # BLD: 0 K/UL (ref 0–0.1)
BASOPHILS NFR BLD: 0 % (ref 0–1)
BILIRUB SERPL-MCNC: 0.8 MG/DL (ref 0.2–1)
BUN SERPL-MCNC: 20 MG/DL (ref 6–20)
BUN/CREAT SERPL: 17 (ref 12–20)
CALCIUM SERPL-MCNC: 9.1 MG/DL (ref 8.5–10.1)
CHLORIDE SERPL-SCNC: 99 MMOL/L (ref 97–108)
CK SERPL-CCNC: 107 U/L (ref 39–308)
CO2 SERPL-SCNC: 30 MMOL/L (ref 21–32)
CREAT SERPL-MCNC: 1.19 MG/DL (ref 0.7–1.3)
D DIMER PPP FEU-MCNC: 0.7 MG/L FEU (ref 0–0.65)
DIFFERENTIAL METHOD BLD: ABNORMAL
EOSINOPHIL # BLD: 0.1 K/UL (ref 0–0.4)
EOSINOPHIL NFR BLD: 1 % (ref 0–7)
ERYTHROCYTE [DISTWIDTH] IN BLOOD BY AUTOMATED COUNT: 13.1 % (ref 11.5–14.5)
GLOBULIN SER CALC-MCNC: 4.9 G/DL (ref 2–4)
GLUCOSE SERPL-MCNC: 130 MG/DL (ref 65–100)
HCT VFR BLD AUTO: 49 % (ref 36.6–50.3)
HGB BLD-MCNC: 16.1 G/DL (ref 12.1–17)
IMM GRANULOCYTES # BLD AUTO: 0.1 K/UL (ref 0–0.04)
IMM GRANULOCYTES NFR BLD AUTO: 1 % (ref 0–0.5)
INR PPP: 1 (ref 0.9–1.1)
LDH SERPL L TO P-CCNC: 336 U/L (ref 85–241)
LYMPHOCYTES # BLD: 1.2 K/UL (ref 0.8–3.5)
LYMPHOCYTES NFR BLD: 18 % (ref 12–49)
MCH RBC QN AUTO: 28.7 PG (ref 26–34)
MCHC RBC AUTO-ENTMCNC: 32.9 G/DL (ref 30–36.5)
MCV RBC AUTO: 87.3 FL (ref 80–99)
MONOCYTES # BLD: 0.4 K/UL (ref 0–1)
MONOCYTES NFR BLD: 7 % (ref 5–13)
NEUTS SEG # BLD: 4.9 K/UL (ref 1.8–8)
NEUTS SEG NFR BLD: 73 % (ref 32–75)
NRBC # BLD: 0 K/UL (ref 0–0.01)
NRBC BLD-RTO: 0 PER 100 WBC
PLATELET # BLD AUTO: 249 K/UL (ref 150–400)
PMV BLD AUTO: 10.2 FL (ref 8.9–12.9)
POTASSIUM SERPL-SCNC: 3.9 MMOL/L (ref 3.5–5.1)
PROCALCITONIN SERPL-MCNC: <0.05 NG/ML
PROT SERPL-MCNC: 7.9 G/DL (ref 6.4–8.2)
PROTHROMBIN TIME: 10.1 SEC (ref 9–11.1)
RBC # BLD AUTO: 5.61 M/UL (ref 4.1–5.7)
SODIUM SERPL-SCNC: 135 MMOL/L (ref 136–145)
THERAPEUTIC RANGE,PTTT: NORMAL SECS (ref 58–77)
TROPONIN I SERPL-MCNC: <0.05 NG/ML
WBC # BLD AUTO: 6.7 K/UL (ref 4.1–11.1)

## 2020-09-12 PROCEDURE — 99285 EMERGENCY DEPT VISIT HI MDM: CPT

## 2020-09-12 PROCEDURE — 83615 LACTATE (LD) (LDH) ENZYME: CPT

## 2020-09-12 PROCEDURE — 84484 ASSAY OF TROPONIN QUANT: CPT

## 2020-09-12 PROCEDURE — 36415 COLL VENOUS BLD VENIPUNCTURE: CPT

## 2020-09-12 PROCEDURE — 85379 FIBRIN DEGRADATION QUANT: CPT

## 2020-09-12 PROCEDURE — 96374 THER/PROPH/DIAG INJ IV PUSH: CPT

## 2020-09-12 PROCEDURE — 71045 X-RAY EXAM CHEST 1 VIEW: CPT

## 2020-09-12 PROCEDURE — 74011000258 HC RX REV CODE- 258: Performed by: STUDENT IN AN ORGANIZED HEALTH CARE EDUCATION/TRAINING PROGRAM

## 2020-09-12 PROCEDURE — 65660000000 HC RM CCU STEPDOWN

## 2020-09-12 PROCEDURE — 83036 HEMOGLOBIN GLYCOSYLATED A1C: CPT

## 2020-09-12 PROCEDURE — 84145 PROCALCITONIN (PCT): CPT

## 2020-09-12 PROCEDURE — 85730 THROMBOPLASTIN TIME PARTIAL: CPT

## 2020-09-12 PROCEDURE — 85610 PROTHROMBIN TIME: CPT

## 2020-09-12 PROCEDURE — 74011250636 HC RX REV CODE- 250/636: Performed by: EMERGENCY MEDICINE

## 2020-09-12 PROCEDURE — 74011250636 HC RX REV CODE- 250/636: Performed by: STUDENT IN AN ORGANIZED HEALTH CARE EDUCATION/TRAINING PROGRAM

## 2020-09-12 PROCEDURE — 80053 COMPREHEN METABOLIC PANEL: CPT

## 2020-09-12 PROCEDURE — 82728 ASSAY OF FERRITIN: CPT

## 2020-09-12 PROCEDURE — 96375 TX/PRO/DX INJ NEW DRUG ADDON: CPT

## 2020-09-12 PROCEDURE — 85025 COMPLETE CBC W/AUTO DIFF WBC: CPT

## 2020-09-12 PROCEDURE — 82550 ASSAY OF CK (CPK): CPT

## 2020-09-12 RX ORDER — POLYETHYLENE GLYCOL 3350 17 G/17G
17 POWDER, FOR SOLUTION ORAL DAILY PRN
Status: DISCONTINUED | OUTPATIENT
Start: 2020-09-12 | End: 2020-09-18 | Stop reason: HOSPADM

## 2020-09-12 RX ORDER — FAMOTIDINE 20 MG/1
20 TABLET, FILM COATED ORAL 2 TIMES DAILY
Status: DISCONTINUED | OUTPATIENT
Start: 2020-09-13 | End: 2020-09-18 | Stop reason: HOSPADM

## 2020-09-12 RX ORDER — ONDANSETRON 2 MG/ML
4 INJECTION INTRAMUSCULAR; INTRAVENOUS
Status: DISCONTINUED | OUTPATIENT
Start: 2020-09-12 | End: 2020-09-16 | Stop reason: SDUPTHER

## 2020-09-12 RX ORDER — SODIUM CHLORIDE 0.9 % (FLUSH) 0.9 %
5-40 SYRINGE (ML) INJECTION AS NEEDED
Status: DISCONTINUED | OUTPATIENT
Start: 2020-09-12 | End: 2020-09-18 | Stop reason: HOSPADM

## 2020-09-12 RX ORDER — ENOXAPARIN SODIUM 100 MG/ML
30 INJECTION SUBCUTANEOUS EVERY 12 HOURS
Status: DISCONTINUED | OUTPATIENT
Start: 2020-09-12 | End: 2020-09-18 | Stop reason: HOSPADM

## 2020-09-12 RX ORDER — PROMETHAZINE HYDROCHLORIDE 25 MG/1
12.5 TABLET ORAL
Status: DISCONTINUED | OUTPATIENT
Start: 2020-09-12 | End: 2020-09-18 | Stop reason: HOSPADM

## 2020-09-12 RX ORDER — ASCORBIC ACID 500 MG
1000 TABLET ORAL DAILY
Status: DISCONTINUED | OUTPATIENT
Start: 2020-09-13 | End: 2020-09-18 | Stop reason: HOSPADM

## 2020-09-12 RX ORDER — ACETAMINOPHEN 325 MG/1
650 TABLET ORAL
Status: DISCONTINUED | OUTPATIENT
Start: 2020-09-12 | End: 2020-09-12

## 2020-09-12 RX ORDER — SODIUM CHLORIDE 0.9 % (FLUSH) 0.9 %
5-40 SYRINGE (ML) INJECTION EVERY 8 HOURS
Status: DISCONTINUED | OUTPATIENT
Start: 2020-09-12 | End: 2020-09-18 | Stop reason: HOSPADM

## 2020-09-12 RX ORDER — INSULIN GLARGINE 100 [IU]/ML
32 INJECTION, SOLUTION SUBCUTANEOUS
Status: DISCONTINUED | OUTPATIENT
Start: 2020-09-13 | End: 2020-09-17

## 2020-09-12 RX ORDER — MAGNESIUM SULFATE 100 %
4 CRYSTALS MISCELLANEOUS AS NEEDED
Status: DISCONTINUED | OUTPATIENT
Start: 2020-09-12 | End: 2020-09-18 | Stop reason: HOSPADM

## 2020-09-12 RX ORDER — DEXAMETHASONE 4 MG/1
6 TABLET ORAL DAILY
Status: DISCONTINUED | OUTPATIENT
Start: 2020-09-12 | End: 2020-09-12

## 2020-09-12 RX ORDER — ONDANSETRON 2 MG/ML
4 INJECTION INTRAMUSCULAR; INTRAVENOUS
Status: DISCONTINUED | OUTPATIENT
Start: 2020-09-12 | End: 2020-09-18 | Stop reason: HOSPADM

## 2020-09-12 RX ORDER — ACETAMINOPHEN 325 MG/1
650 TABLET ORAL
Status: DISCONTINUED | OUTPATIENT
Start: 2020-09-12 | End: 2020-09-18 | Stop reason: HOSPADM

## 2020-09-12 RX ORDER — DEXAMETHASONE 4 MG/1
6 TABLET ORAL DAILY
Status: DISCONTINUED | OUTPATIENT
Start: 2020-09-12 | End: 2020-09-18 | Stop reason: HOSPADM

## 2020-09-12 RX ORDER — ACETAMINOPHEN 650 MG/1
650 SUPPOSITORY RECTAL
Status: DISCONTINUED | OUTPATIENT
Start: 2020-09-12 | End: 2020-09-12

## 2020-09-12 RX ORDER — INSULIN LISPRO 100 [IU]/ML
INJECTION, SOLUTION INTRAVENOUS; SUBCUTANEOUS
Status: DISCONTINUED | OUTPATIENT
Start: 2020-09-13 | End: 2020-09-18 | Stop reason: HOSPADM

## 2020-09-12 RX ORDER — DEXAMETHASONE SODIUM PHOSPHATE 4 MG/ML
6 INJECTION, SOLUTION INTRA-ARTICULAR; INTRALESIONAL; INTRAMUSCULAR; INTRAVENOUS; SOFT TISSUE
Status: COMPLETED | OUTPATIENT
Start: 2020-09-12 | End: 2020-09-12

## 2020-09-12 RX ORDER — ZINC SULFATE 50(220)MG
220 CAPSULE ORAL DAILY
Status: DISCONTINUED | OUTPATIENT
Start: 2020-09-13 | End: 2020-09-18 | Stop reason: HOSPADM

## 2020-09-12 RX ORDER — ATORVASTATIN CALCIUM 40 MG/1
40 TABLET, FILM COATED ORAL DAILY
Status: DISCONTINUED | OUTPATIENT
Start: 2020-09-13 | End: 2020-09-18 | Stop reason: HOSPADM

## 2020-09-12 RX ORDER — ACETAMINOPHEN 650 MG/1
650 SUPPOSITORY RECTAL
Status: DISCONTINUED | OUTPATIENT
Start: 2020-09-12 | End: 2020-09-18 | Stop reason: HOSPADM

## 2020-09-12 RX ORDER — DEXTROSE 50 % IN WATER (D50W) INTRAVENOUS SYRINGE
12.5-25 AS NEEDED
Status: DISCONTINUED | OUTPATIENT
Start: 2020-09-12 | End: 2020-09-18 | Stop reason: HOSPADM

## 2020-09-12 RX ADMIN — CEFTRIAXONE SODIUM 2 G: 2 INJECTION, POWDER, FOR SOLUTION INTRAMUSCULAR; INTRAVENOUS at 23:35

## 2020-09-12 RX ADMIN — DEXAMETHASONE SODIUM PHOSPHATE 6 MG: 4 INJECTION, SOLUTION INTRAMUSCULAR; INTRAVENOUS at 23:36

## 2020-09-13 LAB
ABO + RH BLD: NORMAL
ALBUMIN SERPL-MCNC: 3.2 G/DL (ref 3.5–5)
ALBUMIN/GLOB SERPL: 0.6 {RATIO} (ref 1.1–2.2)
ALP SERPL-CCNC: 81 U/L (ref 45–117)
ALT SERPL-CCNC: 36 U/L (ref 12–78)
ANION GAP SERPL CALC-SCNC: 4 MMOL/L (ref 5–15)
AST SERPL-CCNC: 35 U/L (ref 15–37)
BILIRUB SERPL-MCNC: 0.6 MG/DL (ref 0.2–1)
BLOOD GROUP ANTIBODIES SERPL: NORMAL
BUN SERPL-MCNC: 19 MG/DL (ref 6–20)
BUN/CREAT SERPL: 19 (ref 12–20)
CALCIUM SERPL-MCNC: 9.6 MG/DL (ref 8.5–10.1)
CHLORIDE SERPL-SCNC: 101 MMOL/L (ref 97–108)
CO2 SERPL-SCNC: 30 MMOL/L (ref 21–32)
CREAT SERPL-MCNC: 1.01 MG/DL (ref 0.7–1.3)
CRP SERPL-MCNC: 20 MG/DL (ref 0–0.6)
D DIMER PPP FEU-MCNC: 0.59 MG/L FEU (ref 0–0.65)
D DIMER PPP FEU-MCNC: 0.6 MG/L FEU (ref 0–0.65)
ERYTHROCYTE [DISTWIDTH] IN BLOOD BY AUTOMATED COUNT: 13.2 % (ref 11.5–14.5)
EST. AVERAGE GLUCOSE BLD GHB EST-MCNC: 148 MG/DL
FERRITIN SERPL-MCNC: 1285 NG/ML (ref 26–388)
FERRITIN SERPL-MCNC: 1511 NG/ML (ref 26–388)
FIBRINOGEN PPP-MCNC: >800 MG/DL (ref 200–475)
FIBRINOGEN PPP-MCNC: >800 MG/DL (ref 200–475)
GLOBULIN SER CALC-MCNC: 5.6 G/DL (ref 2–4)
GLUCOSE BLD STRIP.AUTO-MCNC: 113 MG/DL (ref 65–100)
GLUCOSE BLD STRIP.AUTO-MCNC: 116 MG/DL (ref 65–100)
GLUCOSE BLD STRIP.AUTO-MCNC: 126 MG/DL (ref 65–100)
GLUCOSE BLD STRIP.AUTO-MCNC: 165 MG/DL (ref 65–100)
GLUCOSE BLD STRIP.AUTO-MCNC: 184 MG/DL (ref 65–100)
GLUCOSE SERPL-MCNC: 136 MG/DL (ref 65–100)
HBA1C MFR BLD: 6.8 % (ref 4–5.6)
HCT VFR BLD AUTO: 51.7 % (ref 36.6–50.3)
HGB BLD-MCNC: 16.8 G/DL (ref 12.1–17)
INR PPP: 1 (ref 0.9–1.1)
LDH SERPL L TO P-CCNC: 340 U/L (ref 85–241)
MAGNESIUM SERPL-MCNC: 2.6 MG/DL (ref 1.6–2.4)
MCH RBC QN AUTO: 28.8 PG (ref 26–34)
MCHC RBC AUTO-ENTMCNC: 32.5 G/DL (ref 30–36.5)
MCV RBC AUTO: 88.5 FL (ref 80–99)
NRBC # BLD: 0 K/UL (ref 0–0.01)
NRBC BLD-RTO: 0 PER 100 WBC
PLATELET # BLD AUTO: 274 K/UL (ref 150–400)
PMV BLD AUTO: 10.4 FL (ref 8.9–12.9)
POTASSIUM SERPL-SCNC: 4.8 MMOL/L (ref 3.5–5.1)
PROT SERPL-MCNC: 8.8 G/DL (ref 6.4–8.2)
PROTHROMBIN TIME: 10.1 SEC (ref 9–11.1)
RBC # BLD AUTO: 5.84 M/UL (ref 4.1–5.7)
SERVICE CMNT-IMP: ABNORMAL
SODIUM SERPL-SCNC: 135 MMOL/L (ref 136–145)
SPECIMEN EXP DATE BLD: NORMAL
WBC # BLD AUTO: 7.3 K/UL (ref 4.1–11.1)

## 2020-09-13 PROCEDURE — 85384 FIBRINOGEN ACTIVITY: CPT

## 2020-09-13 PROCEDURE — 36415 COLL VENOUS BLD VENIPUNCTURE: CPT

## 2020-09-13 PROCEDURE — 83615 LACTATE (LD) (LDH) ENZYME: CPT

## 2020-09-13 PROCEDURE — 74011250636 HC RX REV CODE- 250/636: Performed by: HOSPITALIST

## 2020-09-13 PROCEDURE — 86140 C-REACTIVE PROTEIN: CPT

## 2020-09-13 PROCEDURE — 83735 ASSAY OF MAGNESIUM: CPT

## 2020-09-13 PROCEDURE — 87635 SARS-COV-2 COVID-19 AMP PRB: CPT

## 2020-09-13 PROCEDURE — 94760 N-INVAS EAR/PLS OXIMETRY 1: CPT

## 2020-09-13 PROCEDURE — 85379 FIBRIN DEGRADATION QUANT: CPT

## 2020-09-13 PROCEDURE — 80053 COMPREHEN METABOLIC PANEL: CPT

## 2020-09-13 PROCEDURE — 85610 PROTHROMBIN TIME: CPT

## 2020-09-13 PROCEDURE — 2709999900 HC NON-CHARGEABLE SUPPLY

## 2020-09-13 PROCEDURE — 74011250637 HC RX REV CODE- 250/637: Performed by: HOSPITALIST

## 2020-09-13 PROCEDURE — 87449 NOS EACH ORGANISM AG IA: CPT

## 2020-09-13 PROCEDURE — 83520 IMMUNOASSAY QUANT NOS NONAB: CPT

## 2020-09-13 PROCEDURE — 74011636637 HC RX REV CODE- 636/637: Performed by: HOSPITALIST

## 2020-09-13 PROCEDURE — 87899 AGENT NOS ASSAY W/OPTIC: CPT

## 2020-09-13 PROCEDURE — 82728 ASSAY OF FERRITIN: CPT

## 2020-09-13 PROCEDURE — 85027 COMPLETE CBC AUTOMATED: CPT

## 2020-09-13 PROCEDURE — 82962 GLUCOSE BLOOD TEST: CPT

## 2020-09-13 PROCEDURE — 65660000000 HC RM CCU STEPDOWN

## 2020-09-13 PROCEDURE — 86900 BLOOD TYPING SEROLOGIC ABO: CPT

## 2020-09-13 PROCEDURE — 77010033678 HC OXYGEN DAILY

## 2020-09-13 PROCEDURE — 77010033711 HC HIGH FLOW OXYGEN

## 2020-09-13 PROCEDURE — 74011000258 HC RX REV CODE- 258: Performed by: HOSPITALIST

## 2020-09-13 RX ORDER — OMEPRAZOLE 40 MG/1
40 CAPSULE, DELAYED RELEASE ORAL DAILY
COMMUNITY

## 2020-09-13 RX ORDER — ACETAMINOPHEN 500 MG
5000 TABLET ORAL DAILY
COMMUNITY

## 2020-09-13 RX ADMIN — ZINC SULFATE 220 MG (50 MG) CAPSULE 220 MG: CAPSULE at 10:10

## 2020-09-13 RX ADMIN — Medication 10 ML: at 00:26

## 2020-09-13 RX ADMIN — AZITHROMYCIN 500 MG: 500 INJECTION, POWDER, LYOPHILIZED, FOR SOLUTION INTRAVENOUS at 00:12

## 2020-09-13 RX ADMIN — INSULIN GLARGINE 32 UNITS: 100 INJECTION, SOLUTION SUBCUTANEOUS at 01:35

## 2020-09-13 RX ADMIN — Medication 10 ML: at 23:36

## 2020-09-13 RX ADMIN — Medication 10 ML: at 12:45

## 2020-09-13 RX ADMIN — INSULIN GLARGINE 32 UNITS: 100 INJECTION, SOLUTION SUBCUTANEOUS at 22:14

## 2020-09-13 RX ADMIN — AZITHROMYCIN 500 MG: 500 INJECTION, POWDER, LYOPHILIZED, FOR SOLUTION INTRAVENOUS at 23:34

## 2020-09-13 RX ADMIN — ENOXAPARIN SODIUM 30 MG: 30 INJECTION SUBCUTANEOUS at 01:36

## 2020-09-13 RX ADMIN — FAMOTIDINE 20 MG: 20 TABLET, FILM COATED ORAL at 10:10

## 2020-09-13 RX ADMIN — FAMOTIDINE 20 MG: 20 TABLET, FILM COATED ORAL at 17:39

## 2020-09-13 RX ADMIN — CEFTRIAXONE SODIUM 2 G: 2 INJECTION, POWDER, FOR SOLUTION INTRAMUSCULAR; INTRAVENOUS at 22:18

## 2020-09-13 RX ADMIN — ENOXAPARIN SODIUM 30 MG: 30 INJECTION SUBCUTANEOUS at 12:44

## 2020-09-13 RX ADMIN — INSULIN LISPRO 2 UNITS: 100 INJECTION, SOLUTION INTRAVENOUS; SUBCUTANEOUS at 12:44

## 2020-09-13 RX ADMIN — Medication 10 ML: at 05:08

## 2020-09-13 RX ADMIN — ENOXAPARIN SODIUM 30 MG: 30 INJECTION SUBCUTANEOUS at 23:35

## 2020-09-13 RX ADMIN — OXYCODONE HYDROCHLORIDE AND ACETAMINOPHEN 1000 MG: 500 TABLET ORAL at 10:10

## 2020-09-13 RX ADMIN — DEXAMETHASONE 6 MG: 4 TABLET ORAL at 22:16

## 2020-09-13 RX ADMIN — ATORVASTATIN CALCIUM 40 MG: 40 TABLET, FILM COATED ORAL at 10:10

## 2020-09-13 NOTE — PROGRESS NOTES
TRANSFER - IN REPORT:    Verbal report received from Lenard on Madison Nelson  being received from ED for routine progression of care      Report consisted of patients Situation, Background, Assessment and   Recommendations(SBAR). Information from the following report(s) SBAR was reviewed with the receiving nurse. Opportunity for questions and clarification was provided. Assessment completed upon patients arrival to unit and care assumed.

## 2020-09-13 NOTE — ACP (ADVANCE CARE PLANNING)
[address] [facility] Adult  Hospitalist Group                                      Advance Care Planning Note    Name: Los Officer  YOB: 1970  MRN: [id number]  Admission Date: 9/12/2020  9:31 PM    Date of discussion: 9/12/2020    Active Diagnoses:    Hospital Problems  Date Reviewed: 12/21/2018          Codes Class Noted POA    Pneumonia ICD-10-CM: J18.9  ICD-9-CM: 359  9/12/2020 Unknown        Acute hypoxemic respiratory failure [facility]) ICD-10-CM: J96.01  ICD-9-CM: 518.81  9/12/2020 Unknown        COVID-19 virus infection ICD-10-CM: U07.1  ICD-9-CM: 079.89  9/12/2020 Unknown              These active diagnoses are of sufficient risk that focused discussion on advance care planning is indicated in order to allow the patient to thoughtfully consider personal goals of care, and if situations arise that prevent the ability to personally give input, to ensure appropriate representation of their personal desires for different levels and aggressiveness of care. Discussion:     Persons present and participating in discussion: Los Officer, Herrera Mcqueen MD,     Discussion: I discussed with patient what CODE STATUS means, and how his current admission and comorbidities affect outcomes. CODE STATUS addressed and want to be  FULL CODE      Time Spent:     Total time spent face-to-face in education and discussion: 16  minutes.      Herrera Mcqueen MD  Date of Service:  9/12/2020  11:54 PM

## 2020-09-13 NOTE — ED NOTES
spk with Dr. Koki Hoffmann regarding sepsis workup, per Dr. Koki Hoffmann no lactic or Blood cultures needed. abx will be given at this time. Pt now willing to accept decadron per MD.  Will administer.

## 2020-09-13 NOTE — ED NOTES
Pt refused decadron stating elevated blood glucose with steroid use in the past. Dr. Luisa Yates notified.

## 2020-09-13 NOTE — PROGRESS NOTES
Called on call service for pulmonary consult and spoke with [facility] and Dr. Isidro Pena is MD on call for pulmonary .

## 2020-09-13 NOTE — PROGRESS NOTES
Orthopedic End of Shift Note    Verbal shift change report given to TAMICA Reynaga (oncoming nurse) by Jessica Reza RN (offgoing nurse). Report included the following information SBAR, ED Summary, Intake/Output, MAR, Recent Results and Cardiac Rhythm sinus tach. POD# N/A  Significant issues during shift: MD voiced on rounds he wants patient moved to a negative pressure room. Passed on in report the plan is for patient to be moved to room 3243 . Housekeeping notified that room can be cleaned .      Issues for Physician to address: none at this time    Activity This Shift  (check all that apply) [] chair  [] dangle   [x] bathroom  [] bedside commode [] hallway  [] bedrest   Nausea/Vomiting [] yes [x] no     Voiding Status [x] void [] Liao [] I&O Cath   Bowel Movements [x] yes [] no     Foot Pumps or SCD [] yes [x] no    Ice Pack [] yes    [x] no    Incentive Spirometer [] yes [x] no Volume:      Telemetry Monitoring   [x] yes [] no Rhythm: sinus tach   Supplemental O2 [x] yes [] no Sat off O2:   88%

## 2020-09-13 NOTE — CONSULTS
Name: Ana Shepard   : 1970 Admit Date: 2020   Phone: [phone]  Room: [address] [place] [address]   PCP: Obie Black NP  MRN: [id number]   Date: 2020  Code: Full Code        HPI:    12:48 PM       History was obtained from patient. I was asked by Ishmael Brand MD to see Marlowshanta Gonsales in consultation for COVID-19/    History of Present Illness: 44year old male with past medical history of DM who presented to [facility] with fever for 5 days - low grade, dry cough and chest tightness for 4 days. [facility] and [facility]-19 test was positive. Last night he had an argument with his girl friend and felt that he could not breathe and was called 911. O2 requirement 2 lpm by nc. CXR - bilateral infiltrates.   PCT < 0.05  Trop < 0.05  Cr 1.01  Fibrinogen > 800  D-dimer 0.60    Past Medical History:   Diagnosis Date    Diabetes ([address]     GERD (gastroesophageal reflux disease)     Hypercholesterolemia     Ill-defined condition     neuropathy    Psychiatric disorder     past depression    Sleep apnea 2015    uses cpap       Past Surgical History:   Procedure Laterality Date    HX ADENOIDECTOMY      partial     HX HEENT Bilateral     rectus recession eye surgery    HX HERNIA REPAIR  2018    Robotic-assisted umbilical hernia repair with mesh    HX TONSILLECTOMY      partial    HX UROLOGICAL      hydrocelectomy    HX UROLOGICAL      Spermatocelectomy    HX UROLOGICAL      Varicocele-varicose vein around testicle    HX VASECTOMY      VASCULAR SURGERY PROCEDURE UNLIST Left     inguinal vein ligation       Family History   Family history unknown: Yes       Social History     Tobacco Use    Smoking status: Former Smoker     Packs/day: 1.00     Years: 20.00     Pack years: 20.00    Smokeless tobacco: Never Used   Substance Use Topics    Alcohol use: Yes     Comment: occasionally       Allergies   Allergen Reactions    Chlorhexidine Towelette Itching    Dolobid [Diflunisal] Rash    [place] Hives       Current Facility-Administered Medications   Medication Dose Route Frequency    cefTRIAXone (ROCEPHIN) 2 g in 0.9% sodium chloride (MBP/ADV) 50 mL  2 g IntraVENous Q24H    azithromycin (ZITHROMAX) 500 mg in 0.9% sodium chloride (MBP/ADV) 250 mL  500 mg IntraVENous Q24H    acetaminophen (TYLENOL) tablet 650 mg  650 mg Oral Q6H PRN    ondansetron (ZOFRAN) injection 4 mg  4 mg IntraVENous Q4H PRN    atorvastatin (LIPITOR) tablet 40 mg  40 mg Oral DAILY    famotidine (PEPCID) tablet 20 mg  20 mg Oral BID    insulin glargine (LANTUS) injection 32 Units  32 Units SubCUTAneous QHS    sodium chloride (NS) flush 5-40 mL  5-40 mL IntraVENous Q8H    sodium chloride (NS) flush 5-40 mL  5-40 mL IntraVENous PRN    acetaminophen (TYLENOL) tablet 650 mg  650 mg Oral Q6H PRN    Or    acetaminophen (TYLENOL) suppository 650 mg  650 mg Rectal Q6H PRN    polyethylene glycol (MIRALAX) packet 17 g  17 g Oral DAILY PRN    promethazine (PHENERGAN) tablet 12.5 mg  12.5 mg Oral Q6H PRN    Or    ondansetron (ZOFRAN) injection 4 mg  4 mg IntraVENous Q6H PRN    ascorbic acid (vitamin C) (VITAMIN C) tablet 1,000 mg  1,000 mg Oral DAILY    zinc sulfate (ZINCATE) 220 (50) mg capsule 220 mg  220 mg Oral DAILY    insulin lispro (HUMALOG) injection   SubCUTAneous AC&HS    glucose chewable tablet 16 g  4 Tab Oral PRN    dextrose (D50W) injection syrg 12.5-25 g  12.5-25 g IntraVENous PRN    glucagon (GLUCAGEN) injection 1 mg  1 mg IntraMUSCular PRN    enoxaparin (LOVENOX) injection 30 mg  30 mg SubCUTAneous Q12H    dexAMETHasone (DECADRON) tablet 6 mg  6 mg Oral DAILY         REVIEW OF SYSTEMS   Negative except as stated in the HPI.       Physical Exam:   Visit Vitals  BP (!) 144/97 (BP 1 Location: Left arm, BP Patient Position: At rest)   Pulse (!) 106   Temp 98.5 °F (36.9 °C)   Resp 18   Ht 6' 1\" (1.854 m)   Wt 104.1 kg (229 lb 8 oz)   SpO2 94%   BMI 30.28 kg/m² Patient is COVID-19 positive and examination was deferred. General:  Alert, cooperative, no distress, appears stated age. Head:  atraumatic. Skin: Skin color, texture, turgor normal. No rashes or lesions       Neurologic: Grossly nonfocal       Lab Results   Component Value Date/Time    Sodium 135 (L) 09/13/2020 05:26 AM    Potassium 4.8 09/13/2020 05:26 AM    Chloride 101 09/13/2020 05:26 AM    CO2 30 09/13/2020 05:26 AM    BUN 19 09/13/2020 05:26 AM    Creatinine 1.01 09/13/2020 05:26 AM    Glucose 136 (H) 09/13/2020 05:26 AM    Calcium 9.6 09/13/2020 05:26 AM    Magnesium 2.6 (H) 09/13/2020 05:26 AM       Lab Results   Component Value Date/Time    WBC 7.3 09/13/2020 05:26 AM    HGB 16.8 09/13/2020 05:26 AM    PLATELET 441 87/26/7450 05:26 AM    MCV 88.5 09/13/2020 05:26 AM       Lab Results   Component Value Date/Time    INR 1.0 09/13/2020 05:26 AM    aPTT 29.3 09/12/2020 09:59 PM    Alk. phosphatase 81 09/13/2020 05:26 AM    Protein, total 8.8 (H) 09/13/2020 05:26 AM    Albumin 3.2 (L) 09/13/2020 05:26 AM    Globulin 5.6 (H) 09/13/2020 05:26 AM       Lab Results   Component Value Date/Time    Ferritin 1,511 (H) 09/13/2020 05:26 AM       Lab Results   Component Value Date/Time    Troponin-I, Qt. <0.05 09/12/2020 09:59 PM        IMPRESSION:  ===========  -COVID-19 positive. -COVID-19 pneumonia/ pneumonitis. -Acute hypoxemic respiratory failure - on 2 lpm by NC.  -DM. PLAN:  =====  -O2 supplementation.  -Follow O2 requirement.  -Send CRP, ESR and d-dimer.  -Check IL-6.  -vitamin c and zinc.  -Rocephin and zithromac.  -decadron IV.  -I have discussed possibility of starting Remdesevir and Hamida. He wants to go home in 1-2 days. Will need to stay for 5 days if we plan to. Will assess in am.  -Recheck PCT tomorrow and if negative. D/c rocephin. -BS control > 180.  [facility] from my stand point to bring his CGI for BS monitoring.  -D-dimer 0.06. dvt ppx regular dose.    Thank you for the consult.     China Booker MD

## 2020-09-13 NOTE — PROGRESS NOTES
I reviewed pertinent labs and imaging, and discussed /agreed on the plan of care with Dr. Margarita Fraser. Hospitalist Progress Note    NAME: Serge Valdes   :  1970   MRN:  [id number]       Assessment / Plan:    Pt.with DM presenting with SOB 2/2 covid-19. Symptoms started 13 days ago, worsening tonight with SOB. Vitals show tachycardia and desaturation to 88% while talking ORA, recovers to 90-92% while resting. Patient placed on O2 upon arrival. Exam shows uncomfortable appearing male, diaphoretic, tachypneic.      Acute respiratory failure due to COVID-19  poa  tested positive for Covid-19 on 20 as an out pt. Pneumonia of both lungs due to infectious organism  poa  Acute respiratory failure with hypoxia   poa   - hypoxia noted to  88% on room air     CXR:  FINDINGS: AP portable imaging of the chest performed at 10:20 PM demonstrates a  normal cardiomediastinal silhouette. There is patchy bibasilar airspace disease. No significant osseous abnormalities are seen.     - appreciate Pul input  - NC 2 L sats at 93%    -  Procal - <0.05   D-Dimer 0.06 Ferritin 1285  - Encourage incentive spirometry  -Supportive and symptomatic rx  - Albuterol prn  - Will f/u covid pcr ordered here today in ED  Lovenox sq 30mg bid  - cont to follow labs      DM-Cont. ssi check a1c  GERD Cont PPI      Code status: Full  Prophylaxis: Lovenox  Recommended Disposition: Home w/Family     Subjective:     Chief Complaint / Reason for Physician Visit  \"I do not want to be here more than 2 days \". Discussed with RN events overnight.  Pt not receptive to continued hospitalization     Review of Systems:  Symptom Y/N Comments  Symptom Y/N Comments   Fever/Chills n   Chest Pain n    Poor Appetite n   Edema n    Cough    Abdominal Pain n    Sputum n   Joint Pain     SOB/STEWARD y   Pruritis/Rash     Nausea/vomit n   Tolerating PT/OT     Diarrhea n   Tolerating Diet y    Constipation    Other       Could NOT obtain due to: Objective:     VITALS:   Last 24hrs VS reviewed since prior progress note. Most recent are:  Patient Vitals for the past 24 hrs:   Temp Pulse Resp BP SpO2   09/13/20 0843 98.7 °F (37.1 °C) (!) 106 18 115/86 90 %   09/13/20 0400 98.5 °F (36.9 °C) 100 18 (!) 129/95 94 %   09/13/20 0106 98.1 °F (36.7 °C) (!) 118 18 128/89 95 %   09/13/20 0000 -- (!) 114 -- -- --   09/12/20 2330 -- 99 19 (!) 146/93 94 %   09/12/20 2315 -- (!) 105 9 128/89 92 %   09/12/20 2300 -- (!) 103 19 (!) 125/90 93 %   09/12/20 2245 -- (!) 104 23 131/86 90 %   09/12/20 2230 -- (!) 108 19 124/81 93 %   09/12/20 2215 -- (!) 107 17 131/88 91 %   09/12/20 2200 -- (!) 104 21 136/82 93 %   09/12/20 2145 -- (!) 112 24 128/88 92 %   09/12/20 2143 99.2 °F (37.3 °C) (!) 114 24 132/83 92 %       Intake/Output Summary (Last 24 hours) at 9/13/2020 1048  Last data filed at 9/13/2020 0136  Gross per 24 hour   Intake 240 ml   Output --   Net 240 ml        PHYSICAL EXAM:  General: WD, WN. Alert, cooperative, no acute distress    EENT:  EOMI. Anicteric sclerae. MMM  Resp:  CTA bilaterally, no wheezing or rales. No accessory muscle                             use  CV:  S1S2 No edema  GI:  Soft, Non distended, Non tender.  +Bowel sounds  Neurologic:  Alert and oriented X 3, normal speech,   Psych:   Good insight. Not anxious nor agitated  Skin:  No rashes. No jaundice    Reviewed most current lab test results and cultures  YES  Reviewed most current radiology test results   YES  Review and summation of old records today    NO  Reviewed patient's current orders and MAR    YES  PMH/SH reviewed - no change compared to H&P  ________________________________________________________________________  Care Plan discussed with:    Comments   Patient x    Family      RN x    Care Manager     Consultant                        Multidiciplinary team rounds were held today with , nursing, pharmacist and clinical coordinator.   Patient's plan of care was discussed; medications were reviewed and discharge planning was addressed. ________________________________________________________________________  Total NON critical care TIME:  30   Minutes    Total CRITICAL CARE TIME Spent:   Minutes non procedure based      Comments   >50% of visit spent in counseling and coordination of care     ________________________________________________________________________  Luis Langford, TAYLOR     Procedures: see electronic medical records for all procedures/Xrays and details which were not copied into this note but were reviewed prior to creation of Plan. LABS:  I reviewed today's most current labs and imaging studies. Pertinent labs include:  Recent Labs     09/13/20 0526 09/12/20 2159   WBC 7.3 6.7   HGB 16.8 16.1   HCT 51.7* 49.0    249     Recent Labs     09/13/20 0526 09/12/20  2159   * 135*   K 4.8 3.9    99   CO2 30 30   * 130*   BUN 19 20   CREA 1.01 1.19   CA 9.6 9.1   MG 2.6*  --    ALB 3.2* 3.0*   TBILI 0.6 0.8   ALT 36 32   INR 1.0 1.0       Signed: Emilia Langford, NP

## 2020-09-13 NOTE — H&P
Hospitalist Admission Note    NAME: Rachna Cline   :  1970   MRN:  [id number]     Date/Time:  2020 11:32 PM    Patient PCP: Dianne Avalos, NP  _____________________________________________________________________  Given the patient's current clinical presentation, I have a high level of concern for decompensation if discharged from the emergency department. Complex decision making was performed, which includes reviewing the patient's available past medical records, laboratory results, and x-ray films. My assessment of this patient's clinical condition and my plan of care is as follows. Assessment / Plan:  CQ.QFVG DM presenting with SOB 2/2 covid-19. Symptoms started 13 days ago, worsening tonight with SOB. Vitals show tachycardia and desaturation to 88% while talking ORA, recovers to 90-92% while resting. Patient placed on O2 upon arrival. Exam shows uncomfortable appearing male, diaphoretic, tachypneic. Acute respiratory failure due to COVID-19  poa  tested positive for Covid-19 on 20 as an out pt. Pneumonia of both lungs due to infectious organism  poa  Acute respiratory failure with hypoxia   poa    Patient CXR shows bilateral infiltrates, starting ABX for CAP. Patient on 2L NC, for O2, hanging in 92% pulse ox. Taken off O2 for conversation and desaturated to 85-88% while talking, 90-92% while resting. Will admit to hospital for hypoxia and pneumonia   CXR:  FINDINGS: AP portable imaging of the chest performed at 10:20 PM demonstrates a  normal cardiomediastinal silhouette. There is patchy bibasilar airspace disease. No significant osseous abnormalities are seen. Will obtain covid labs for inflammation  Will start  dexamethasone for COVID. Check urine antigen  Pulmn consulted  Vit. c and zinc     Encourage incentive spirometry  Supportive and symptomatic rx  Albuterol prn  Primary team to consult ID in am  Cont.covid complete isolation   Will f/u covid pcr ordered here today in ED  Lovenox sq 30mg bid    DM-Cont. ssi check a1c  GERD Cont PPI      Code Status: full  Surrogate Decision Maker:    DVT Prophylaxis:  SQ HEP  GI Prophylaxis: not indicated    Baseline: independent        Subjective:   CHIEF COMPLAINT:  SOB    HISTORY OF PRESENT ILLNESS:    Iman Fernandez is a 44 y.o. male, pmhx DM, GERD, who presents by ambulance to the ED c/o SOB 2/2 covid-19. He notes he began having multiple symptoms 8/31/20. He received outpatient testing and was tested positive for Covid-19 on 9/5/20. His symptoms that started on 8/31 include nausea/vomiting (2 episodes), anosmia, ageusia, HA, rhinnorhea, myalgias, fever to 104 resolved with acetaminopohen, cough. He reports tonight, him and his girlfriend got into a verbal argument and he noticed he was SOB with prolonged speech. He noticed the SOB getting worse throughout the night and presented to the ED for evaluation. Of note, his girlfriend also tested positive and is in the ED for similar symptoms. He currently denies many of his original symptoms and currently only notes SOB, cough, fatigue. PCP: Kamron Whitt NP     There are no other complaints, changes, or physical findings at this time. We were asked to admit for work up and evaluation of the above problems.      Past Medical History:   Diagnosis Date    Diabetes ([address]     GERD (gastroesophageal reflux disease)     Hypercholesterolemia     Ill-defined condition     neuropathy    Psychiatric disorder     past depression    Sleep apnea 2015    uses cpap        Past Surgical History:   Procedure Laterality Date    HX ADENOIDECTOMY      partial     HX HEENT Bilateral     rectus recession eye surgery    HX HERNIA REPAIR  12/06/2018    Robotic-assisted umbilical hernia repair with mesh    HX TONSILLECTOMY      partial    HX UROLOGICAL      hydrocelectomy    HX UROLOGICAL      Spermatocelectomy    HX UROLOGICAL      Varicocele-varicose vein around testicle    HX VASECTOMY      VASCULAR SURGERY PROCEDURE UNLIST Left     inguinal vein ligation       Social History     Tobacco Use    Smoking status: Former Smoker     Packs/day: 1.00     Years: 20.00     Pack years: 20.00    Smokeless tobacco: Never Used   Substance Use Topics    Alcohol use: Yes     Comment: occasionally        Family History   Family history unknown: Yes     Allergies   Allergen Reactions    Chlorhexidine Towelette Itching    Dolobid [Diflunisal] Rash    Randy Hives        Prior to Admission medications    Medication Sig Start Date End Date Taking? Authorizing Provider   phentermine (ADIPEX-P) 37.5 mg tablet Take 37.5 mg by mouth daily. 7/15/19   Provider, Historical   semaglutide (OZEMPIC) 0.25 mg/0.2 mL (2 mg/1.5 mL) sub-q pen 1 mg by SubCUTAneous route every seven (7) days. Provider, Historical   famotidine (Pepcid) 20 mg tablet Take 20 mg by mouth two (2) times a day. Provider, Historical   naproxen sodium (NAPROSYN) 220 mg tablet Take 220 mg by mouth two (2) times daily as needed. Provider, Historical   insulin glargine U-300 conc (TOUJEO MAX U-300 SOLOSTAR) 300 unit/mL (3 mL) inpn 70-80 Units by SubCUTAneous route nightly. Provider, Historical   JARDIANCE 25 mg tablet Take 25 mg by mouth daily. 7/9/18   Provider, Historical   metFORMIN ER 1,000 mg tr24 Take 2,000 mg by mouth daily. Jesus Gama MD   atorvastatin (LIPITOR) 40 mg tablet Take 40 mg by mouth daily. Jesus Gama MD       REVIEW OF SYSTEMS:     I am not able to complete the review of systems because: The patient is intubated and sedated    The patient has altered mental status due to his acute medical problems    The patient has baseline aphasia from prior stroke(s)    The patient has baseline dementia and is not reliable historian    The patient is in acute medical distress and unable to provide information           Constitutional: Positive for activity change, chills, fatigue and fever. HENT: Positive for congestion, postnasal drip, rhinorrhea and sore throat. Negative for sinus pressure, sinus pain, sneezing and trouble swallowing. Loss of smell and taste   Eyes: Negative for photophobia, discharge and visual disturbance. Respiratory: Positive for cough and shortness of breath. Negative for wheezing. Cardiovascular: Negative for chest pain, palpitations and leg swelling. Gastrointestinal: Positive for constipation. Negative for abdominal pain, nausea and vomiting. Endocrine: Negative. Genitourinary: Negative for difficulty urinating. Musculoskeletal: Negative for arthralgias, myalgias, neck pain and neck stiffness. Skin: Negative for rash. Allergic/Immunologic: Negative for immunocompromised state. Neurological: Negative for weakness, numbness and headaches. Hematological: Negative. Does not bruise/bleed easily. Psychiatric/Behavioral: Negative. Objective:   VITALS:    Visit Vitals  /86   Pulse (!) 104   Temp 99.2 °F (37.3 °C)   Resp 23   Ht 6' 1\" (1.854 m)   Wt 104.1 kg (229 lb 8 oz)   SpO2 90%   BMI 30.28 kg/m²       PHYSICAL EXAM:    Constitutional:       General: He is not in acute distress. Appearance: He is well-developed. HENT:      Head: Normocephalic and atraumatic. Eyes:      Extraocular Movements: Extraocular movements intact. Neck:      Musculoskeletal: Normal range of motion. Cardiovascular:      Rate and Rhythm: Regular rhythm. Tachycardia present. Pulmonary:      Effort: Tachypnea present. No accessory muscle usage. Comments: Respiratory distress increases when talking with desaturation to 88%  Chest:      Chest wall: No deformity. Abdominal:      Palpations: There is no mass. Musculoskeletal: Normal range of motion. Right lower leg: No edema. Left lower leg: No edema. Skin:     Findings: No rash. Neurological:      General: No focal deficit present.       Mental Status: He is alert and oriented to person, place, and time. Psychiatric:         Mood and Affect: Mood normal.         Behavior: Behavior normal.     _______________________________________________________________________  Care Plan discussed with:    Comments   Patient y    Family      RN y    Care Manager                    Consultant:  sterling Ed md   _______________________________________________________________________  Expected  Disposition:   Home with Family y   HH/PT/OT/RN    SNF/LTC    RAFAEL    ________________________________________________________________________  TOTAL TIME: 61   Minutes    Critical Care Provided     Minutes non procedure based      Comments    y Reviewed previous records   >50% of visit spent in counseling and coordination of care y Discussion with patient and/or family and questions answered       Given the patient's current clinical presentation, I have a high level of concern for decompensation if discharged from the ED. Complex decision making was performed which includes reviewing the patient's available past medical records, laboratory results, and Xray films. I have also directly communicated my plan and discussed this case with the involved ED physician.     ____________________________________________________________________  Rosalva Fournier MD    Procedures: see electronic medical records for all procedures/Xrays and details which were not copied into this note but were reviewed prior to creation of Plan.     LAB DATA REVIEWED:    Recent Results (from the past 24 hour(s))   CBC WITH AUTOMATED DIFF    Collection Time: 09/12/20  9:59 PM   Result Value Ref Range    WBC 6.7 4.1 - 11.1 K/uL    RBC 5.61 4.10 - 5.70 M/uL    HGB 16.1 12.1 - 17.0 g/dL    HCT 49.0 36.6 - 50.3 %    MCV 87.3 80.0 - 99.0 FL    MCH 28.7 26.0 - 34.0 PG    MCHC 32.9 30.0 - 36.5 g/dL    RDW 13.1 11.5 - 14.5 %    PLATELET 785 551 - 148 K/uL    MPV 10.2 8.9 - 12.9 FL    NRBC 0.0 0  WBC    ABSOLUTE NRBC 0.00 0.00 - 0.01 K/uL    NEUTROPHILS 73 32 - 75 %    LYMPHOCYTES 18 12 - 49 %    MONOCYTES 7 5 - 13 %    EOSINOPHILS 1 0 - 7 %    BASOPHILS 0 0 - 1 %    IMMATURE GRANULOCYTES 1 (H) 0.0 - 0.5 %    ABS. NEUTROPHILS 4.9 1.8 - 8.0 K/UL    ABS. LYMPHOCYTES 1.2 0.8 - 3.5 K/UL    ABS. MONOCYTES 0.4 0.0 - 1.0 K/UL    ABS. EOSINOPHILS 0.1 0.0 - 0.4 K/UL    ABS. BASOPHILS 0.0 0.0 - 0.1 K/UL    ABS. IMM. GRANS. 0.1 (H) 0.00 - 0.04 K/UL    DF AUTOMATED     METABOLIC PANEL, COMPREHENSIVE    Collection Time: 09/12/20  9:59 PM   Result Value Ref Range    Sodium 135 (L) 136 - 145 mmol/L    Potassium 3.9 3.5 - 5.1 mmol/L    Chloride 99 97 - 108 mmol/L    CO2 30 21 - 32 mmol/L    Anion gap 6 5 - 15 mmol/L    Glucose 130 (H) 65 - 100 mg/dL    BUN 20 6 - 20 MG/DL    Creatinine 1.19 0.70 - 1.30 MG/DL    BUN/Creatinine ratio 17 12 - 20      GFR est AA >60 >60 ml/min/1.73m2    GFR est non-AA >60 >60 ml/min/1.73m2    Calcium 9.1 8.5 - 10.1 MG/DL    Bilirubin, total 0.8 0.2 - 1.0 MG/DL    ALT (SGPT) 32 12 - 78 U/L    AST (SGOT) 33 15 - 37 U/L    Alk.  phosphatase 73 45 - 117 U/L    Protein, total 7.9 6.4 - 8.2 g/dL    Albumin 3.0 (L) 3.5 - 5.0 g/dL    Globulin 4.9 (H) 2.0 - 4.0 g/dL    A-G Ratio 0.6 (L) 1.1 - 2.2     TROPONIN I    Collection Time: 09/12/20  9:59 PM   Result Value Ref Range    Troponin-I, Qt. <0.05 <0.05 ng/mL   CK W/ REFLX CKMB    Collection Time: 09/12/20  9:59 PM   Result Value Ref Range     39 - 308 U/L   PROCALCITONIN    Collection Time: 09/12/20  9:59 PM   Result Value Ref Range    Procalcitonin <0.05 ng/mL   PROTHROMBIN TIME + INR    Collection Time: 09/12/20  9:59 PM   Result Value Ref Range    INR 1.0 0.9 - 1.1      Prothrombin time 10.1 9.0 - 11.1 sec   PTT    Collection Time: 09/12/20  9:59 PM   Result Value Ref Range    aPTT 29.3 22.1 - 32.0 sec    aPTT, therapeutic range     58.0 - 77.0 SECS   LD    Collection Time: 09/12/20  9:59 PM   Result Value Ref Range     (H) 85 - 241 U/L   D DIMER    Collection Time: 09/12/20  9:59 PM   Result Value Ref Range    D-dimer 0.70 (H) 0.00 - 0.65 mg/L FEU

## 2020-09-13 NOTE — ROUTINE PROCESS
TRANSFER - OUT REPORT:    Verbal report given to TAMICA Vargas(name) on [facility]  being transferred to  (unit) for routine progression of care       Report consisted of patients Situation, Background, Assessment and   Recommendations(SBAR). Information from the following report(s) SBAR, Kardex, ED Summary, MAR, Recent Results and Cardiac Rhythm ST was reviewed with the receiving nurse. Lines:   Peripheral IV 09/12/20 Right Antecubital (Active)   Site Assessment Clean, dry, & intact 09/12/20 2159   Phlebitis Assessment 0 09/12/20 2159   Infiltration Assessment 0 09/12/20 2159   Dressing Type Tape;Transparent 09/12/20 2159   Hub Color/Line Status Pink;Flushed;Patent 09/12/20 2159   Action Taken Blood drawn 09/12/20 2159        Opportunity for questions and clarification was provided.       Patient transported with:   O2 @ 2 liters

## 2020-09-13 NOTE — PROGRESS NOTES
Pulmonary consult called [phone] . Spoke with Braeden Blankenship and the on- call MD Dr. Jl Mabry on call and paged.

## 2020-09-13 NOTE — ED PROVIDER NOTES
EMERGENCY DEPARTMENT HISTORY AND PHYSICAL EXAM          Date: 9/12/2020  Patient Name: Herminia Cabrales  Attending of Record: Xiomara Childers MD    History of Presenting Illness     Chief Complaint   Patient presents with    Shortness of Breath     pt arrives via EMS for c/o SOB after arguing with girlfriend. pt was +COVID on Monday and reports fever daily since. pt denies CP. pt states unable to catch his breath. pt on RA 89-92%. initial symptoms on Thursday       History Provided By: Patient    HPI: Herminia Cabrales is a 44 y.o. male, pmhx DM, GERD, who presents by ambulance to the ED c/o SOB 2/2 covid-19. He notes he began having multiple symptoms 8/31/20. He received outpatient testing and was tested positive for Covid-19 on 9/5/20. His symptoms that started on 8/31 include nausea/vomiting (2 episodes), anosmia, ageusia, HA, rhinnorhea, myalgias, fever to 104 resolved with acetaminopohen, cough. He reports tonight, him and his girlfriend got into a verbal argument and he noticed he was SOB with prolonged speech. He noticed the SOB getting worse throughout the night and presented to the ED for evaluation. Of note, his girlfriend also tested positive and is in the ED for similar symptoms. He currently denies many of his original symptoms and currently only notes SOB, cough, fatigue. PCP: Margarita Luna NP    There are no other complaints, changes, or physical findings at this time.      Current Facility-Administered Medications   Medication Dose Route Frequency Provider Last Rate Last Dose    cefTRIAXone (ROCEPHIN) 2 g in 0.9% sodium chloride (MBP/ADV) 50 mL  2 g IntraVENous Q24H Mirella CHAMBERLAIN  mL/hr at 09/12/20 2335 2 g at 09/12/20 2335    azithromycin (ZITHROMAX) 500 mg in 0.9% sodium chloride (MBP/ADV) 250 mL  500 mg IntraVENous Q24H Mirella CHAMBERLAIN  mL/hr at 09/13/20 0012 500 mg at 09/13/20 0012    acetaminophen (TYLENOL) tablet 650 mg  650 mg Oral Q6H PRN MD Karlene Lomeli ondansetron (ZOFRAN) injection 4 mg  4 mg IntraVENous Q4H PRN Paul Cagle MD        atorvastatin (LIPITOR) tablet 40 mg  40 mg Oral DAILY Din, Sherrie Neely MD        famotidine (PEPCID) tablet 20 mg  20 mg Oral BID Din, Sherrie Neely MD        insulin glargine (LANTUS) injection 32 Units  32 Units SubCUTAneous QHS Flores Knight MD   32 Units at 09/13/20 0135    sodium chloride (NS) flush 5-40 mL  5-40 mL IntraVENous Q8H Din, Zechariah CHAMBERLAIN MD   10 mL at 09/13/20 0508    sodium chloride (NS) flush 5-40 mL  5-40 mL IntraVENous PRN Landen, Sherrie Neely MD        acetaminophen (TYLENOL) tablet 650 mg  650 mg Oral Q6H PRN Landen, Sherrie Neely MD        Or    acetaminophen (TYLENOL) suppository 650 mg  650 mg Rectal Q6H PRN Din, Sherrie Neely MD        polyethylene glycol (MIRALAX) packet 17 g  17 g Oral DAILY PRN Landen, Sherrie Neely MD        promethazine (PHENERGAN) tablet 12.5 mg  12.5 mg Oral Q6H PRN Landen, Sherrie Neely MD        Or    ondansetron (ZOFRAN) injection 4 mg  4 mg IntraVENous Q6H PRN Landen, Sherrie eNely MD        ascorbic acid (vitamin C) (VITAMIN C) tablet 1,000 mg  1,000 mg Oral DAILY Din, Sherrie Neely MD        zinc sulfate (ZINCATE) 220 (50) mg capsule 220 mg  220 mg Oral DAILY Din, Sherrie Neely MD        insulin lispro (HUMALOG) injection   SubCUTAneous AC&HS Din, Sherrie Neely MD        glucose chewable tablet 16 g  4 Tab Oral PRN Din, Sherrie Neely MD        dextrose (D50W) injection syrg 12.5-25 g  12.5-25 g IntraVENous PRN Din, Sherrie Neely MD        glucagon (GLUCAGEN) injection 1 mg  1 mg IntraMUSCular PRN Landen, Sherrie Neely MD        enoxaparin (LOVENOX) injection 30 mg  30 mg SubCUTAneous Q12H Din, Zechariah CHAMBERLAIN MD   30 mg at 09/13/20 0136    dexAMETHasone (DECADRON) tablet 6 mg  6 mg Oral DAILY Sherrie Schuster MD           Past History     Past Medical History:  Past Medical History:   Diagnosis Date    Diabetes ([facility] [address]     GERD (gastroesophageal reflux disease)     Hypercholesterolemia     Ill-defined condition     neuropathy    Psychiatric disorder     past depression    Sleep apnea 2015    uses cpap       Past Surgical History:  Past Surgical History:   Procedure Laterality Date    HX ADENOIDECTOMY      partial     HX HEENT Bilateral     rectus recession eye surgery    HX HERNIA REPAIR  12/06/2018    Robotic-assisted umbilical hernia repair with mesh    HX TONSILLECTOMY      partial    HX UROLOGICAL      hydrocelectomy    HX UROLOGICAL      Spermatocelectomy    HX UROLOGICAL      Varicocele-varicose vein around testicle    HX VASECTOMY      VASCULAR SURGERY PROCEDURE UNLIST Left     inguinal vein ligation       Family History:  Family History   Family history unknown: Yes       Social History:  Social History     Tobacco Use    Smoking status: Former Smoker     Packs/day: 1.00     Years: 20.00     Pack years: 20.00    Smokeless tobacco: Never Used   Substance Use Topics    Alcohol use: Yes     Comment: occasionally    Drug use: No       Allergies: Allergies   Allergen Reactions    Chlorhexidine Towelette Itching    Dolobid [Diflunisal] Rash    [place] Hives         Review of Systems   Review of Systems   Constitutional: Positive for activity change, chills, fatigue and fever. HENT: Positive for congestion, postnasal drip, rhinorrhea and sore throat. Negative for sinus pressure, sinus pain, sneezing and trouble swallowing. Loss of smell and taste   Eyes: Negative for photophobia, discharge and visual disturbance. Respiratory: Positive for cough and shortness of breath. Negative for wheezing. Cardiovascular: Negative for chest pain, palpitations and leg swelling. Gastrointestinal: Positive for constipation. Negative for abdominal pain, nausea and vomiting. Endocrine: Negative. Genitourinary: Negative for difficulty urinating. Musculoskeletal: Negative for arthralgias, myalgias, neck pain and neck stiffness. Skin: Negative for rash. Allergic/Immunologic: Negative for immunocompromised state.    Neurological: Negative for weakness, numbness and headaches. Hematological: Negative. Does not bruise/bleed easily. Psychiatric/Behavioral: Negative. Physical Exam   Physical Exam  Constitutional:       General: He is in acute distress. Appearance: He is well-developed. He is ill-appearing, toxic-appearing and diaphoretic. HENT:      Head: Normocephalic and atraumatic. Eyes:      Extraocular Movements: Extraocular movements intact. Neck:      Musculoskeletal: Normal range of motion. Cardiovascular:      Rate and Rhythm: Regular rhythm. Tachycardia present. Pulmonary:      Effort: Tachypnea present. No accessory muscle usage. Comments: Respiratory distress increases when talking with desaturation to 88%  Chest:      Chest wall: No deformity. Abdominal:      Palpations: There is no mass. Musculoskeletal: Normal range of motion. Right lower leg: No edema. Left lower leg: No edema. Skin:     Findings: No rash. Neurological:      General: No focal deficit present. Mental Status: He is alert and oriented to person, place, and time. Psychiatric:         Mood and Affect: Mood normal.         Behavior: Behavior normal.          Diagnostic Study Results     Labs -     Recent Results (from the past 12 hour(s))   CBC WITH AUTOMATED DIFF    Collection Time: 09/12/20  9:59 PM   Result Value Ref Range    WBC 6.7 4.1 - 11.1 K/uL    RBC 5.61 4.10 - 5.70 M/uL    HGB 16.1 12.1 - 17.0 g/dL    HCT 49.0 36.6 - 50.3 %    MCV 87.3 80.0 - 99.0 FL    MCH 28.7 26.0 - 34.0 PG    MCHC 32.9 30.0 - 36.5 g/dL    RDW 13.1 11.5 - 14.5 %    PLATELET 020 025 - 772 K/uL    MPV 10.2 8.9 - 12.9 FL    NRBC 0.0 0  WBC    ABSOLUTE NRBC 0.00 0.00 - 0.01 K/uL    NEUTROPHILS 73 32 - 75 %    LYMPHOCYTES 18 12 - 49 %    MONOCYTES 7 5 - 13 %    EOSINOPHILS 1 0 - 7 %    BASOPHILS 0 0 - 1 %    IMMATURE GRANULOCYTES 1 (H) 0.0 - 0.5 %    ABS. NEUTROPHILS 4.9 1.8 - 8.0 K/UL    ABS.  LYMPHOCYTES 1.2 0.8 - 3.5 K/UL ABS. MONOCYTES 0.4 0.0 - 1.0 K/UL    ABS. EOSINOPHILS 0.1 0.0 - 0.4 K/UL    ABS. BASOPHILS 0.0 0.0 - 0.1 K/UL    ABS. IMM. GRANS. 0.1 (H) 0.00 - 0.04 K/UL    DF AUTOMATED     METABOLIC PANEL, COMPREHENSIVE    Collection Time: 09/12/20  9:59 PM   Result Value Ref Range    Sodium 135 (L) 136 - 145 mmol/L    Potassium 3.9 3.5 - 5.1 mmol/L    Chloride 99 97 - 108 mmol/L    CO2 30 21 - 32 mmol/L    Anion gap 6 5 - 15 mmol/L    Glucose 130 (H) 65 - 100 mg/dL    BUN 20 6 - 20 MG/DL    Creatinine 1.19 0.70 - 1.30 MG/DL    BUN/Creatinine ratio 17 12 - 20      GFR est AA >60 >60 ml/min/1.73m2    GFR est non-AA >60 >60 ml/min/1.73m2    Calcium 9.1 8.5 - 10.1 MG/DL    Bilirubin, total 0.8 0.2 - 1.0 MG/DL    ALT (SGPT) 32 12 - 78 U/L    AST (SGOT) 33 15 - 37 U/L    Alk.  phosphatase 73 45 - 117 U/L    Protein, total 7.9 6.4 - 8.2 g/dL    Albumin 3.0 (L) 3.5 - 5.0 g/dL    Globulin 4.9 (H) 2.0 - 4.0 g/dL    A-G Ratio 0.6 (L) 1.1 - 2.2     TROPONIN I    Collection Time: 09/12/20  9:59 PM   Result Value Ref Range    Troponin-I, Qt. <0.05 <0.05 ng/mL   CK W/ REFLX CKMB    Collection Time: 09/12/20  9:59 PM   Result Value Ref Range     39 - 308 U/L   PROCALCITONIN    Collection Time: 09/12/20  9:59 PM   Result Value Ref Range    Procalcitonin <0.05 ng/mL   PROTHROMBIN TIME + INR    Collection Time: 09/12/20  9:59 PM   Result Value Ref Range    INR 1.0 0.9 - 1.1      Prothrombin time 10.1 9.0 - 11.1 sec   PTT    Collection Time: 09/12/20  9:59 PM   Result Value Ref Range    aPTT 29.3 22.1 - 32.0 sec    aPTT, therapeutic range     58.0 - 77.0 SECS   LD    Collection Time: 09/12/20  9:59 PM   Result Value Ref Range     (H) 85 - 241 U/L   D DIMER    Collection Time: 09/12/20  9:59 PM   Result Value Ref Range    D-dimer 0.70 (H) 0.00 - 0.65 mg/L FEU   SARS-COV-2    Collection Time: 09/13/20 12:27 AM   Result Value Ref Range    Specimen source Nasopharyngeal      SARS-CoV-2 PENDING     SARS-CoV-2 PENDING     Specimen source Nasopharyngeal      COVID-19 rapid test PENDING     Specimen type NP Swab      Health status PENDING     COVID-19 PENDING    GLUCOSE, POC    Collection Time: 09/13/20  1:34 AM   Result Value Ref Range    Glucose (POC) 165 (H) 65 - 100 mg/dL    Performed by Lowell Jc    METABOLIC PANEL, COMPREHENSIVE    Collection Time: 09/13/20  5:26 AM   Result Value Ref Range    Sodium 135 (L) 136 - 145 mmol/L    Potassium 4.8 3.5 - 5.1 mmol/L    Chloride 101 97 - 108 mmol/L    CO2 30 21 - 32 mmol/L    Anion gap 4 (L) 5 - 15 mmol/L    Glucose 136 (H) 65 - 100 mg/dL    BUN 19 6 - 20 MG/DL    Creatinine 1.01 0.70 - 1.30 MG/DL    BUN/Creatinine ratio 19 12 - 20      GFR est AA >60 >60 ml/min/1.73m2    GFR est non-AA >60 >60 ml/min/1.73m2    Calcium 9.6 8.5 - 10.1 MG/DL    Bilirubin, total 0.6 0.2 - 1.0 MG/DL    ALT (SGPT) 36 12 - 78 U/L    AST (SGOT) 35 15 - 37 U/L    Alk.  phosphatase 81 45 - 117 U/L    Protein, total 8.8 (H) 6.4 - 8.2 g/dL    Albumin 3.2 (L) 3.5 - 5.0 g/dL    Globulin 5.6 (H) 2.0 - 4.0 g/dL    A-G Ratio 0.6 (L) 1.1 - 2.2     MAGNESIUM    Collection Time: 09/13/20  5:26 AM   Result Value Ref Range    Magnesium 2.6 (H) 1.6 - 2.4 mg/dL   CBC W/O DIFF    Collection Time: 09/13/20  5:26 AM   Result Value Ref Range    WBC 7.3 4.1 - 11.1 K/uL    RBC 5.84 (H) 4.10 - 5.70 M/uL    HGB 16.8 12.1 - 17.0 g/dL    HCT 51.7 (H) 36.6 - 50.3 %    MCV 88.5 80.0 - 99.0 FL    MCH 28.8 26.0 - 34.0 PG    MCHC 32.5 30.0 - 36.5 g/dL    RDW 13.2 11.5 - 14.5 %    PLATELET 285 298 - 742 K/uL    MPV 10.4 8.9 - 12.9 FL    NRBC 0.0 0  WBC    ABSOLUTE NRBC 0.00 0.00 - 0.01 K/uL   PROTHROMBIN TIME + INR    Collection Time: 09/13/20  5:26 AM   Result Value Ref Range    INR 1.0 0.9 - 1.1      Prothrombin time 10.1 9.0 - 11.1 sec   FIBRINOGEN    Collection Time: 09/13/20  5:26 AM   Result Value Ref Range    Fibrinogen >800 (H) 200 - 475 mg/dL   LD    Collection Time: 09/13/20  5:26 AM   Result Value Ref Range     (H) 85 - 241 U/L   D DIMER    Collection Time: 09/13/20  5:26 AM   Result Value Ref Range    D-dimer 0.60 0.00 - 0.65 mg/L FEU       Radiologic Studies -   XR CHEST PORT   Final Result   IMPRESSION: Patchy bilateral airspace disease likely represents pneumonia. CT Results  (Last 48 hours)    None        CXR Results  (Last 48 hours)               09/12/20 2233  XR CHEST PORT Final result    Impression:  IMPRESSION: Patchy bilateral airspace disease likely represents pneumonia. Narrative:  INDICATION: Chest pain and shortness of breath       FINDINGS: AP portable imaging of the chest performed at 10:20 PM demonstrates a   normal cardiomediastinal silhouette. There is patchy bibasilar airspace disease. No significant osseous abnormalities are seen. Medical Decision Making   I am the first provider for this patient. I reviewed the vital signs, available nursing notes, past medical history, past surgical history, family history and social history. Vital Signs-Reviewed the patient's vital signs.   Patient Vitals for the past 12 hrs:   Temp Pulse Resp BP SpO2   09/13/20 0400 98.5 °F (36.9 °C) 100 18 (!) 129/95 94 %   09/13/20 0106 98.1 °F (36.7 °C) (!) 118 18 128/89 95 %   09/13/20 0000 -- (!) 114 -- -- --   09/12/20 2330 -- 99 19 (!) 146/93 94 %   09/12/20 2315 -- (!) 105 9 128/89 92 %   09/12/20 2300 -- (!) 103 19 (!) 125/90 93 %   09/12/20 2245 -- (!) 104 23 131/86 90 %   09/12/20 2230 -- (!) 108 19 124/81 93 %   09/12/20 2215 -- (!) 107 17 131/88 91 %   09/12/20 2200 -- (!) 104 21 136/82 93 %   09/12/20 2145 -- (!) 112 24 128/88 92 %   09/12/20 2143 99.2 °F (37.3 °C) (!) 114 24 132/83 92 %       ECG Interpretation: Sinus tachycardia, rate 104, normal axis and intervals, non-ischemic    Records Reviewed: Nursing Notes and Old Medical Records    Provider Notes (Medical Decision Making):   DDx: Covid-19, URI, septic shock, respiratory failure    46yoM with PMH of gerd and DM presenting with SOB 2/2 covid-19. Symptoms started 13 days ago, worsening tonight with SOB. Vitals show tachycardia and desaturation to 88% while talking ORA, recovers to 90-92% while resting. Patient placed on O2 upon arrival.  Exam shows uncomfortable appearing male, diaphoretic, tachypneic. Will obtain CXR, CBC, BMP and covid labs for inflammation. Will start acetaminophen PRN and dexamethasone for COVID. Likely admission for acute hypoxic respiratory failure. ED Course and Progress Notes:   Initial assessment performed. The patients presenting problems have been discussed, and they are in agreement with the care plan formulated and outlined with them. I have encouraged them to ask questions as they arise throughout their visit. ED Course as of Sep 13 0642   Sat Sep 12, 2020   2217 Patient refused dexamethasone, notes he took it in the past and it made his BG go too high. [JS]      ED Course User Index  [JS] Zara Maki MD     Patient CXR shows bilateral infiltrates, starting ABX for CAP. Patient on 2L NC, for O2, hanging in 92% pulse ox. Taken off O2 for conversation and desaturated to 85-88% while talking, 90-92% while resting. Will admit to hospital for hypoxia. Orders Placed This Encounter    SEVERE SEPSIS AND SEPTIC SHOCK BUNDLE INITIATED    MECHANICAL PROPHYLAXIS IS CONTRAINDICATED Other, please document Already on Anticoagulation    S. PNEUMO AG, UR/CSF    LEGIONELLA PNEUMOPHILA AG, URINE    XR CHEST PORT    CBC WITH AUTOMATED DIFF    METABOLIC PANEL, COMPREHENSIVE    TROPONIN I    CK W/REFLEX CKMB    PROCALCITONIN    PROTHROMBIN TIME + INR    PTT    FIBRINOGEN    LD    FERRITIN    D DIMER    METABOLIC PANEL, COMPREHENSIVE    MAGNESIUM    CBC W/O DIFF    PROTHROMBIN TIME + INR    HEMOGLOBIN A1C WITH EAG    FIBRINOGEN    LD    FERRITIN    D DIMER    SARS-COV-2    DIET DIABETIC CONSISTENT CARB Regular    CARDIAC/RESPIRATORY MONITORING    NOTIFY PROVIDER: SPECIFY Notify provider within one hour to start vasopressors if patient is unable to maintain a MAP of greater than or equal to 65 mmHg despite fluid resuscitation CONTINUOUS STAT    NOTIFY PROVIDER: SPECIFY Notify provider on pt's arrival to floor ONE TIME STAT    [place] [id number] [facility] [place]note 2] NOTIFY PROVIDER: SPECIFY Notify physician for pulse less than 50 or greater than 120, respiratory rate less than 12 or greater than 25, oral temperature greater than 101.3 F (01.3 C), systolic BP less than 90 or greater than 988, diastolic BP less. ..    ACTIVITY AS TOLERATED W/ASSIST    INTAKE AND OUTPUT    NURSING-MISCELLANEOUS: Palpate, scan or straight cath and document bladder residual as needed CONTINUOUS    INCENTIVE SPIROMETRY    COUGH, DEEP BREATHE    Cardiac Monitor    NURSING-MISCELLANEOUS: PPE required for any aerosolizing procedure (ie, intubation, bronchoscopy). Surgical mask on patient for any transport outside room. Patient should be single room, closed door with notification of droplet plus isolation. CON. ..    FULL CODE    DROPLET PLUS    OXYGEN CANNULA Liters per minute: 2; Indications for O2 therapy: HYPOXIA PRN STAT    GLUCOSE, POC    EKG 12 LEAD INITIAL    EKG, 12 LEAD, INITIAL    TYPE & SCREEN    INSERT PERIPHERAL IV ONE TIME STAT    DISCONTD: acetaminophen (TYLENOL) tablet 650 mg    DISCONTD: acetaminophen (TYLENOL) suppository 650 mg    DISCONTD: dexAMETHasone (DECADRON) tablet 6 mg    DISCONTD: dexamethasone (DECADRON) 6 mg in 0.9% sodium chloride 50 mL IVPB    dexamethasone (DECADRON) 4 mg/mL injection 6 mg    cefTRIAXone (ROCEPHIN) 2 g in 0.9% sodium chloride (MBP/ADV) 50 mL    azithromycin (ZITHROMAX) 500 mg in 0.9% sodium chloride (MBP/ADV) 250 mL    acetaminophen (TYLENOL) tablet 650 mg    ondansetron (ZOFRAN) injection 4 mg    atorvastatin (LIPITOR) tablet 40 mg    famotidine (PEPCID) tablet 20 mg    insulin glargine (LANTUS) injection 32 Units    sodium chloride (NS) flush 5-40 mL    sodium chloride (NS) flush 5-40 mL    OR Linked Order Group     acetaminophen (TYLENOL) tablet 650 mg     acetaminophen (TYLENOL) suppository 650 mg    polyethylene glycol (MIRALAX) packet 17 g    OR Linked Order Group     promethazine (PHENERGAN) tablet 12.5 mg     ondansetron (ZOFRAN) injection 4 mg    ascorbic acid (vitamin C) (VITAMIN C) tablet 1,000 mg    zinc sulfate (ZINCATE) 220 (50) mg capsule 220 mg    insulin lispro (HUMALOG) injection    glucose chewable tablet 16 g    dextrose (D50W) injection syrg 12.5-25 g    glucagon (GLUCAGEN) injection 1 mg    enoxaparin (LOVENOX) injection 30 mg    dexAMETHasone (DECADRON) tablet 6 mg    omeprazole (PRILOSEC) 40 mg capsule    cholecalciferol (VITAMIN D3) (2,000 UNITS /50 MCG) cap capsule    B-complex with vitamin C (SUPER B COMPLEX-VITAMIN C PO)    IP CONSULT TO HOSPITALIST    IP CONSULT TO PULMONOLOGY    INITIAL PHYSICIAN ORDER: INPATIENT Telemetry; 3.  Patient receiving treatment that can only be provided in an inpatient setting (further clarification in H&P documentation)     Medications   cefTRIAXone (ROCEPHIN) 2 g in 0.9% sodium chloride (MBP/ADV) 50 mL (2 g IntraVENous New Bag 9/12/20 2335)   azithromycin (ZITHROMAX) 500 mg in 0.9% sodium chloride (MBP/ADV) 250 mL (500 mg IntraVENous New Bag 9/13/20 0012)   acetaminophen (TYLENOL) tablet 650 mg (has no administration in time range)   ondansetron (ZOFRAN) injection 4 mg (has no administration in time range)   atorvastatin (LIPITOR) tablet 40 mg (has no administration in time range)   famotidine (PEPCID) tablet 20 mg (has no administration in time range)   insulin glargine (LANTUS) injection 32 Units (32 Units SubCUTAneous Given 9/13/20 0135)   sodium chloride (NS) flush 5-40 mL (10 mL IntraVENous Given 9/13/20 0508)   sodium chloride (NS) flush 5-40 mL (has no administration in time range)   acetaminophen (TYLENOL) tablet 650 mg (has no administration in time range)     Or   acetaminophen (TYLENOL) suppository 650 mg (has no administration in time range)   polyethylene glycol (MIRALAX) packet 17 g (has no administration in time range)   promethazine (PHENERGAN) tablet 12.5 mg (has no administration in time range)     Or   ondansetron (ZOFRAN) injection 4 mg (has no administration in time range)   ascorbic acid (vitamin C) (VITAMIN C) tablet 1,000 mg (has no administration in time range)   zinc sulfate (ZINCATE) 220 (50) mg capsule 220 mg (has no administration in time range)   insulin lispro (HUMALOG) injection (has no administration in time range)   glucose chewable tablet 16 g (has no administration in time range)   dextrose (D50W) injection syrg 12.5-25 g (has no administration in time range)   glucagon (GLUCAGEN) injection 1 mg (has no administration in time range)   enoxaparin (LOVENOX) injection 30 mg (30 mg SubCUTAneous Given 9/13/20 0136)   dexAMETHasone (DECADRON) tablet 6 mg ( Oral Canceled Entry 9/12/20 2354)   dexamethasone (DECADRON) 4 mg/mL injection 6 mg (6 mg IntraVENous Given 9/12/20 2336)     Progress Note:  Given concerns for COVID-19 infection in this patient, I spent extra time to ensure proper and full PPE was used for the initial assessment and for subsequent patient encounters for updates and reassessments. This was done to help combat the transmission of the virus to myself and other patients and staff in the emergency department. Consult Note:  Thania Reyes MD spoke with Dr. Elvira Hernandez,   Specialty: Hospitalist  Discussed pt's hx, disposition, and available diagnostic and imaging results. Reviewed care plans. Agree with management and plan thus far. Consultant will evaluate pt for admission.     CRITICAL CARE NOTE :  IMPENDING DETERIORATION -Airway, Respiratory, Cardiovascular, Metabolic and Renal  ASSOCIATED RISK FACTORS - Hypotension, Shock, Hypoxia, Dysrhythmia, Metabolic changes, Dehydration and Vascular Compromise  MANAGEMENT- Bedside Assessment and Supervision of Care  INTERPRETATION -  Xrays, CT Scan, ECG, Blood Pressure and Cardiac Output Measures   INTERVENTIONS - hemodynamic mngmt, Metobolic interventions and management of acute COVID-19 infection with respiratory failure and hypoxia  CASE REVIEW - Hospitalist, Nursing and Family  TREATMENT RESPONSE -Improved  PERFORMED BY - Self    NOTES   :  I personally spent 55 minutes of critical care time. This is time spent at this critically ill patient's bedside actively involved in patient care as well as the coordination of care and discussions with the patient's family. This includes time involved in lab review, consultations with specialist, family decision-making, bedside attention and documentation. During this entire length of time I was immediately available to the patient. This does not include any procedural time which has been billed separately. Critical Care: The reason for providing this level of medical care for this critically-ill patient was due to a critical illness that impaired one or more vital organ systems, such that there was a high probability of imminent or life-threatening deterioration in the patient's condition. This care involved the highest level of preparedness to intervene urgently. This care involved high complexity decision making to assess, manipulate, and support vital system functions, to treat this degree of vital organ system failure, and to prevent further life threatening deterioration of the patients condition requiring frequent assessments and interventions. Gloria Hansen MD    Disposition: ADMIT  Patient is being admitted to the hospital.  The results of their tests and reasons for their admission have been discussed with them and/or available family. They convey agreement and understanding for the need to be admitted and for their admission diagnosis.   Consultation has been made with the inpatient physician specialist for hospitalization. Diagnosis     Clinical Impression:   1. Acute respiratory failure with hypoxia ([address]    2. Pneumonia of both lungs due to infectious organism, unspecified part of lung    3. Acute respiratory failure due to COVID-19 ([address]    4. SIRS (systemic inflammatory response syndrome) (HCC)    5. Positive D dimer      I personally performed the services described in this documentation on this date 9/12/2020 for [employer]. I have reviewed and verified that all the information is accurate and complete. Rachid Payne MD    This note will not be viewable in [address]

## 2020-09-14 LAB
ALBUMIN SERPL-MCNC: 2.9 G/DL (ref 3.5–5)
ALBUMIN/GLOB SERPL: 0.6 {RATIO} (ref 1.1–2.2)
ALP SERPL-CCNC: 64 U/L (ref 45–117)
ALT SERPL-CCNC: 34 U/L (ref 12–78)
ANION GAP SERPL CALC-SCNC: 6 MMOL/L (ref 5–15)
ANION GAP SERPL CALC-SCNC: 9 MMOL/L (ref 5–15)
AST SERPL-CCNC: 28 U/L (ref 15–37)
BILIRUB SERPL-MCNC: 0.5 MG/DL (ref 0.2–1)
BUN SERPL-MCNC: 22 MG/DL (ref 6–20)
BUN SERPL-MCNC: 22 MG/DL (ref 6–20)
BUN/CREAT SERPL: 29 (ref 12–20)
BUN/CREAT SERPL: 29 (ref 12–20)
CALCIUM SERPL-MCNC: 8.6 MG/DL (ref 8.5–10.1)
CALCIUM SERPL-MCNC: 8.9 MG/DL (ref 8.5–10.1)
CHLORIDE SERPL-SCNC: 104 MMOL/L (ref 97–108)
CHLORIDE SERPL-SCNC: 105 MMOL/L (ref 97–108)
CO2 SERPL-SCNC: 24 MMOL/L (ref 21–32)
CO2 SERPL-SCNC: 26 MMOL/L (ref 21–32)
COVID-19, XGCOVT: DETECTED
CREAT SERPL-MCNC: 0.76 MG/DL (ref 0.7–1.3)
CREAT SERPL-MCNC: 0.77 MG/DL (ref 0.7–1.3)
D DIMER PPP FEU-MCNC: 0.36 MG/L FEU (ref 0–0.65)
ERYTHROCYTE [DISTWIDTH] IN BLOOD BY AUTOMATED COUNT: 12.9 % (ref 11.5–14.5)
ERYTHROCYTE [SEDIMENTATION RATE] IN BLOOD: 58 MM/HR (ref 0–20)
FLUID CULTURE, SPNG2: NORMAL
GLOBULIN SER CALC-MCNC: 4.6 G/DL (ref 2–4)
GLUCOSE BLD STRIP.AUTO-MCNC: 148 MG/DL (ref 65–100)
GLUCOSE BLD STRIP.AUTO-MCNC: 150 MG/DL (ref 65–100)
GLUCOSE BLD STRIP.AUTO-MCNC: 184 MG/DL (ref 65–100)
GLUCOSE BLD STRIP.AUTO-MCNC: 256 MG/DL (ref 65–100)
GLUCOSE SERPL-MCNC: 145 MG/DL (ref 65–100)
GLUCOSE SERPL-MCNC: 155 MG/DL (ref 65–100)
HCT VFR BLD AUTO: 44.1 % (ref 36.6–50.3)
HEALTH STATUS, XMCV2T: ABNORMAL
HGB BLD-MCNC: 14.1 G/DL (ref 12.1–17)
L PNEUMO1 AG UR QL IA: NEGATIVE
LDH SERPL L TO P-CCNC: 275 U/L (ref 85–241)
MCH RBC QN AUTO: 28 PG (ref 26–34)
MCHC RBC AUTO-ENTMCNC: 32 G/DL (ref 30–36.5)
MCV RBC AUTO: 87.5 FL (ref 80–99)
NRBC # BLD: 0 K/UL (ref 0–0.01)
NRBC BLD-RTO: 0 PER 100 WBC
ORGANISM ID, SPNG3: NORMAL
PLATELET # BLD AUTO: 305 K/UL (ref 150–400)
PLEASE NOTE, SPNG4: NORMAL
PMV BLD AUTO: 9.8 FL (ref 8.9–12.9)
POTASSIUM SERPL-SCNC: 4 MMOL/L (ref 3.5–5.1)
POTASSIUM SERPL-SCNC: 4.3 MMOL/L (ref 3.5–5.1)
PROCALCITONIN SERPL-MCNC: <0.05 NG/ML
PROT SERPL-MCNC: 7.5 G/DL (ref 6.4–8.2)
RBC # BLD AUTO: 5.04 M/UL (ref 4.1–5.7)
S PNEUM AG SPEC QL LA: NEGATIVE
SERVICE CMNT-IMP: ABNORMAL
SODIUM SERPL-SCNC: 136 MMOL/L (ref 136–145)
SODIUM SERPL-SCNC: 138 MMOL/L (ref 136–145)
SOURCE, COVRS: ABNORMAL
SPECIMEN SOURCE, FCOV2M: ABNORMAL
SPECIMEN SOURCE: NORMAL
SPECIMEN SOURCE: NORMAL
SPECIMEN TYPE, XMCV1T: ABNORMAL
SPECIMEN, SPNG1: NORMAL
WBC # BLD AUTO: 6.7 K/UL (ref 4.1–11.1)

## 2020-09-14 PROCEDURE — 82962 GLUCOSE BLOOD TEST: CPT

## 2020-09-14 PROCEDURE — 80053 COMPREHEN METABOLIC PANEL: CPT

## 2020-09-14 PROCEDURE — 77010033678 HC OXYGEN DAILY

## 2020-09-14 PROCEDURE — 74011250636 HC RX REV CODE- 250/636: Performed by: HOSPITALIST

## 2020-09-14 PROCEDURE — 65660000000 HC RM CCU STEPDOWN

## 2020-09-14 PROCEDURE — 74011636637 HC RX REV CODE- 636/637: Performed by: HOSPITALIST

## 2020-09-14 PROCEDURE — 74011000258 HC RX REV CODE- 258: Performed by: INTERNAL MEDICINE

## 2020-09-14 PROCEDURE — 85379 FIBRIN DEGRADATION QUANT: CPT

## 2020-09-14 PROCEDURE — 74011250637 HC RX REV CODE- 250/637: Performed by: HOSPITALIST

## 2020-09-14 PROCEDURE — 36415 COLL VENOUS BLD VENIPUNCTURE: CPT

## 2020-09-14 PROCEDURE — 85652 RBC SED RATE AUTOMATED: CPT

## 2020-09-14 PROCEDURE — 94760 N-INVAS EAR/PLS OXIMETRY 1: CPT

## 2020-09-14 PROCEDURE — 74011000250 HC RX REV CODE- 250: Performed by: INTERNAL MEDICINE

## 2020-09-14 PROCEDURE — 85027 COMPLETE CBC AUTOMATED: CPT

## 2020-09-14 PROCEDURE — 83615 LACTATE (LD) (LDH) ENZYME: CPT

## 2020-09-14 PROCEDURE — 99223 1ST HOSP IP/OBS HIGH 75: CPT | Performed by: INTERNAL MEDICINE

## 2020-09-14 PROCEDURE — 84145 PROCALCITONIN (PCT): CPT

## 2020-09-14 RX ORDER — INSULIN LISPRO 100 [IU]/ML
6 INJECTION, SOLUTION INTRAVENOUS; SUBCUTANEOUS
Status: DISCONTINUED | OUTPATIENT
Start: 2020-09-15 | End: 2020-09-15

## 2020-09-14 RX ADMIN — OXYCODONE HYDROCHLORIDE AND ACETAMINOPHEN 1000 MG: 500 TABLET ORAL at 08:56

## 2020-09-14 RX ADMIN — INSULIN GLARGINE 32 UNITS: 100 INJECTION, SOLUTION SUBCUTANEOUS at 22:04

## 2020-09-14 RX ADMIN — Medication 10 ML: at 09:01

## 2020-09-14 RX ADMIN — SODIUM CHLORIDE 200 MG: 9 INJECTION, SOLUTION INTRAVENOUS at 16:34

## 2020-09-14 RX ADMIN — Medication 10 ML: at 22:10

## 2020-09-14 RX ADMIN — INSULIN LISPRO 5 UNITS: 100 INJECTION, SOLUTION INTRAVENOUS; SUBCUTANEOUS at 11:30

## 2020-09-14 RX ADMIN — DEXAMETHASONE 6 MG: 4 TABLET ORAL at 22:04

## 2020-09-14 RX ADMIN — Medication 10 ML: at 14:00

## 2020-09-14 RX ADMIN — ZINC SULFATE 220 MG (50 MG) CAPSULE 220 MG: CAPSULE at 08:56

## 2020-09-14 RX ADMIN — ENOXAPARIN SODIUM 30 MG: 30 INJECTION SUBCUTANEOUS at 23:35

## 2020-09-14 RX ADMIN — AZITHROMYCIN 500 MG: 500 INJECTION, POWDER, LYOPHILIZED, FOR SOLUTION INTRAVENOUS at 22:05

## 2020-09-14 RX ADMIN — FAMOTIDINE 20 MG: 20 TABLET, FILM COATED ORAL at 17:03

## 2020-09-14 RX ADMIN — FAMOTIDINE 20 MG: 20 TABLET, FILM COATED ORAL at 09:00

## 2020-09-14 RX ADMIN — INSULIN LISPRO 2 UNITS: 100 INJECTION, SOLUTION INTRAVENOUS; SUBCUTANEOUS at 16:30

## 2020-09-14 RX ADMIN — INSULIN LISPRO 2 UNITS: 100 INJECTION, SOLUTION INTRAVENOUS; SUBCUTANEOUS at 07:30

## 2020-09-14 RX ADMIN — ENOXAPARIN SODIUM 30 MG: 30 INJECTION SUBCUTANEOUS at 11:55

## 2020-09-14 RX ADMIN — ATORVASTATIN CALCIUM 40 MG: 40 TABLET, FILM COATED ORAL at 08:56

## 2020-09-14 NOTE — CONSULTS
Infectious Disease Consult  Whitley Hobson MD FAC    Date of Consultation:  September 14, 2020  Date of Admission: 9/12/2020   Referring Physician: Dr Allen Pittman:     Patient is a 44 y.o. male who is being seen for - Covid pneumonia , consideration for Remdesivir         IMPRESSION:   · Covid pneumonia  · SARS- Cov2 + on 9/13, initial positive on 9/5 at OSH  · Chest x-ray-bilateral patchy infiltrates. · Acute hypoxic respiratory failure 93% on 4 L. · Elevated acute phase reactants- pro calcitonin less than 0.05, d-dimer 0.60, ferritin 1511, . · Diabetes mellitus  · Obesity BMI 30. PLAN:      · Patient meets criteria for Rashaun Keto is aware. We will start him remedies via IV now risks benefits have been discussed with patient. Informed consent obtained by car Dr. Dea Matt. This was discussed again and obtained from patient. · Daily CMP- monitor creatinine, LFTs. · Monitor acute phase reactants. · Continue ceftriaxone, azithromycin, vitamin C, zinc  · Continue Decadron  · If further decline consider convalescent plasma. Nahomi Chaudhry is a 44 y.o. male, pmhx DM, GERD, who presented by ambulance to the ED c/o SOB 2/2 covid-19. Per ED  note he began having multiple symptoms 8/31/20. He received outpatient testing and was tested positive for Covid-19 on 9/5/20. His symptoms that started on 8/31 include nausea/vomiting (2 episodes), anosmia, ageusia, HA, rhinnorhea, myalgias, fever to 104 resolved with acetaminopohen, cough. He reports tonight, him and his girlfriend got into a verbal argument and he noticed he was SOB with prolonged speech. He noticed the SOB getting worse throughout the night and presented to the ED for evaluation. Of note, his girlfriend also tested positive and is in the ED for similar symptoms. He currently denies many of his original symptoms and currently only notes SOB, cough, fatigue.   On speaking to patient today he states he feels better, still short of breath. Cough improved. No fever. No diarrhea. Discussed with patient remedies via IV dosage duration clinical trials and outcomes benefits risks. Patient has already been educated on random days of year by Dr. Terrilyn Saint. Informed consent obtained. Pharmacy notified. Parameters reviewed to be started right away. Remdesivir Initiation Note    [facility] meets criteria for initiation of remdesivir under the emergency use authorization (EUA) based on the following:   Known or suspected COVID-19   Severe disease (SpO2 ? 94% on RA, requiring supplemental O2, or requiring invasive mechanical ventilation)   Acceptable renal function  o CrCl ? 30 ml/min based on SCr obtained prior to initiation OR   o CrCl < 30 ml/min but the potential benefit of remdesivir outweighs the risk   Acceptable hepatic function (ALT within 5 times ULN)    I have discussed with the patient/proxy information consistent with the Fact Sheet for Patients and Parents/Caregivers\" and the patient/proxy has agreed to initiating remdesivir after being:   Given the Fact Sheet for Patients and Parents/Caregivers   Informed of the alternatives to receiving remdesivir, and    Informed that remdesivir is an unapproved drug that is authorized for use under EUA    Liver function tests will be monitored daily while on remdesivir.         Patient Active Problem List   Diagnosis Code    Type II or unspecified type diabetes mellitus without mention of complication, uncontrolled [id number]    Hypogonadism male P41.1    Umbilical hernia without obstruction and without gangrene K42.9    Pneumonia J18.9    Acute hypoxemic respiratory failure (HCC) J96.01    COVID-19 virus infection U07.1     Past Medical History:   Diagnosis Date    Diabetes ([facility] [address]     GERD (gastroesophageal reflux disease)     Hypercholesterolemia     Ill-defined condition     neuropathy    Psychiatric disorder     past depression    Sleep apnea 2015    uses cpap Family History   Family history unknown: Yes      Social History     Tobacco Use    Smoking status: Former Smoker     Packs/day: 1.00     Years: 20.00     Pack years: 20.00    Smokeless tobacco: Never Used   Substance Use Topics    Alcohol use: Yes     Comment: occasionally     Past Surgical History:   Procedure Laterality Date    HX ADENOIDECTOMY      partial     HX HEENT Bilateral     rectus recession eye surgery    HX HERNIA REPAIR  12/06/2018    Robotic-assisted umbilical hernia repair with mesh    HX TONSILLECTOMY      partial    HX UROLOGICAL      hydrocelectomy    HX UROLOGICAL      Spermatocelectomy    HX UROLOGICAL      Varicocele-varicose vein around testicle    HX VASECTOMY      VASCULAR SURGERY PROCEDURE UNLIST Left     inguinal vein ligation      Prior to Admission medications    Medication Sig Start Date End Date Taking? Authorizing Provider   omeprazole (PRILOSEC) 40 mg capsule Take 40 mg by mouth daily. Yes Provider, Historical   cholecalciferol (VITAMIN D3) (2,000 UNITS /50 MCG) cap capsule Take 5,000 Units by mouth daily. Yes Provider, Historical   B-complex with vitamin C (SUPER B COMPLEX-VITAMIN C PO) Take  by mouth. Yes Provider, Historical   semaglutide (OZEMPIC) 0.25 mg/0.2 mL (2 mg/1.5 mL) sub-q pen 1 mg by SubCUTAneous route every seven (7) days. Yes Provider, Historical   famotidine (Pepcid) 20 mg tablet Take 20 mg by mouth two (2) times a day. Yes Provider, Historical   naproxen sodium (NAPROSYN) 220 mg tablet Take 220 mg by mouth two (2) times daily as needed. Yes Provider, Historical   insulin glargine U-300 conc (TOUJEO MAX U-300 SOLOSTAR) 300 unit/mL (3 mL) inpn 30 Units by SubCUTAneous route daily. Yes Provider, Historical   JARDIANCE 25 mg tablet Take 25 mg by mouth daily. 7/9/18  Yes Provider, Historical   metFORMIN ER 1,000 mg tr24 Take 2,000 mg by mouth daily. Yes Other, MD Jesus   atorvastatin (LIPITOR) 40 mg tablet Take 40 mg by mouth daily.    Yes Other, MD Jesus   phentermine (ADIPEX-P) 37.5 mg tablet Take 37.5 mg by mouth daily. 7/15/19   Provider, Historical     Allergies   Allergen Reactions    Chlorhexidine Towelette Itching    Dolobid [Diflunisal] Rash    Cyrus Hives        Review of Systems:  A comprehensive review of systems was negative except for that written in the History of Present Illness. 10 point review of systems obtained . All other systems negative    Objective:   Blood pressure (!) 135/94, pulse 93, temperature 97.9 °F (36.6 °C), resp. rate 18, height 6' 1\" (1.854 m), weight 229 lb 8 oz (104.1 kg), SpO2 91 %.   Temp (24hrs), Av.3 °F (36.8 °C), Min:97.9 °F (36.6 °C), Max:98.6 °F (37 °C)    Current Facility-Administered Medications   Medication Dose Route Frequency    remdesivir 200 mg in 0.9% sodium chloride 250 mL IVPB  200 mg IntraVENous ONCE    Followed by   Mireya Mares ON 9/15/2020] remdesivir 100 mg in 0.9% sodium chloride 250 mL IVPB  100 mg IntraVENous Q24H    cefTRIAXone (ROCEPHIN) 1 g in 0.9% sodium chloride (MBP/ADV) 50 mL  1 g IntraVENous Q24H    azithromycin (ZITHROMAX) 500 mg in 0.9% sodium chloride (MBP/ADV) 250 mL  500 mg IntraVENous Q24H    acetaminophen (TYLENOL) tablet 650 mg  650 mg Oral Q6H PRN    ondansetron (ZOFRAN) injection 4 mg  4 mg IntraVENous Q4H PRN    atorvastatin (LIPITOR) tablet 40 mg  40 mg Oral DAILY    famotidine (PEPCID) tablet 20 mg  20 mg Oral BID    insulin glargine (LANTUS) injection 32 Units  32 Units SubCUTAneous QHS    sodium chloride (NS) flush 5-40 mL  5-40 mL IntraVENous Q8H    sodium chloride (NS) flush 5-40 mL  5-40 mL IntraVENous PRN    acetaminophen (TYLENOL) tablet 650 mg  650 mg Oral Q6H PRN    Or    acetaminophen (TYLENOL) suppository 650 mg  650 mg Rectal Q6H PRN    polyethylene glycol (MIRALAX) packet 17 g  17 g Oral DAILY PRN    promethazine (PHENERGAN) tablet 12.5 mg  12.5 mg Oral Q6H PRN    Or    ondansetron (ZOFRAN) injection 4 mg  4 mg IntraVENous Q6H PRN    ascorbic acid (vitamin C) (VITAMIN C) tablet 1,000 mg  1,000 mg Oral DAILY    zinc sulfate (ZINCATE) 220 (50) mg capsule 220 mg  220 mg Oral DAILY    insulin lispro (HUMALOG) injection   SubCUTAneous AC&HS    glucose chewable tablet 16 g  4 Tab Oral PRN    dextrose (D50W) injection syrg 12.5-25 g  12.5-25 g IntraVENous PRN    glucagon (GLUCAGEN) injection 1 mg  1 mg IntraMUSCular PRN    enoxaparin (LOVENOX) injection 30 mg  30 mg SubCUTAneous Q12H    dexAMETHasone (DECADRON) tablet 6 mg  6 mg Oral DAILY        Exam:   /14/20 1113  97.9 °F (36.6 °C)  93  135/94Abnormal    108  --  --  18  91 %  --  --  --  --    09/14/20 0854  98 °F (36.7 °C)  101Abnormal    133/90Abnormal    104  --  --  18  96 %  --  --  --  --    09/14/20 0426  98.4 °F (36.9 °C)  93  111/88  96  --  --  16  94 %  --  --  --  --    09/14/20 0000  --  103Abnormal    --  --  --  --  --  --  --  --  --  --    09/13/20 2340  98.3 °F (36.8 °C)  96  122/85  97  --  --  16  92 %  --  --  --  --    09/13/20 1951  98.3 °F (36.8 °C)  98  122/94Abnormal    103  --  At rest  16  91 %  Nasal cannula  4 l/min          Data Reviewed:   CBC:   Recent Labs     09/14/20  0509 09/13/20 0526 09/12/20 2159   WBC 6.7 7.3 6.7   RBC 5.04 5.84* 5.61   HGB 14.1 16.8 16.1   HCT 44.1 51.7* 49.0    274 249   GRANS  --   --  73   LYMPH  --   --  18   EOS  --   --  1     CMP:   Recent Labs     09/14/20  0509 09/13/20  0526 09/12/20 2159   * 136* 130*    135* 135*   K 4.3 4.8 3.9    101 99   CO2 24 30 30   BUN 22* 19 20   CREA 0.76 1.01 1.19   CA 8.6 9.6 9.1   AGAP 9 4* 6   BUCR 29* 19 17   AP  --  81 73   TP  --  8.8* 7.9   ALB  --  3.2* 3.0*   GLOB  --  5.6* 4.9*   AGRAT  --  0.6* 0.6*       Lab Results   Component Value Date/Time    Culture result:  01/05/2017 10:57 AM     NO ROUTINE ENTERIC PATHOGENS ISOLATED INCLUDING SALMONELLA, SHIGELLA, YERSINIA, VIBRIO OR SHIGA TOXIN PRODUCING E. COLI          XR Results (most recent):  Results from Hospital Encounter encounter on 09/12/20   XR CHEST PORT    Narrative INDICATION: Chest pain and shortness of breath    FINDINGS: AP portable imaging of the chest performed at 10:20 PM demonstrates a  normal cardiomediastinal silhouette. There is patchy bibasilar airspace disease. No significant osseous abnormalities are seen. Impression IMPRESSION: Patchy bilateral airspace disease likely represents pneumonia. ICD-10-CM ICD-9-CM    1. Acute respiratory failure with hypoxia ([facility])  J96.01 518.81    2. Pneumonia of both lungs due to infectious organism, unspecified part of lung  J18.9 483.8    3. Acute respiratory failure due to COVID-19 ([facility])  U07.1 079.89     J96.00 518.81    4. SIRS (systemic inflammatory response syndrome) ([facility])  R65.10 995.90    5. Positive D dimer  R79.89 790.92            Antibiotic History  Ceftriaxone / Azithromycin IV  I have discussed the diagnosis with the patient and the intended plan as seen in the above orders. I have discussed medication side effects and warnings with the patient as well.     Reviewed test results at length with patient    Signed By: Maia Greer MD FACP    September 14, 2020

## 2020-09-14 NOTE — DIABETES MGMT
JAKY LYNN  CLINICAL NURSE SPECIALIST CONSULT  PROGRAM FOR DIABETES HEALTH    INITIAL NOTE    Presentation   Sharri Brennan is a 44 y.o. male admitted from the ER 9/12/20 with dyspnea X2 days PTA. Admits to fevers. COVID +. HX: Type 2 diabetes. Hypercholesterolemia. GERD. Sleep apnea. DX: QCFUP-11 +. TX: Steroid. Remdisivir to start    Current clinical course has been uncomplicated. Diabetes: Patient has known Type 2 diabetes X15 years, treated with insulin & non-insulin agents PTA. Family history unknown for diabetes. Seen by Gallo Palmer MD (endocrinologist). Has lost 80 lbs purposely and as a result has reduced his insulin dose. Consulted by Provider for advanced diabetes nursing assessment and care, specifically related to   [x] Inpatient management strategy    Diabetes-related medical history-deferred at this time    Diabetes medication history  Drug class Currently in use Discontinued Never used   Biguanide Metformin 1000mg twice daily     DDP-4 inhibitor       Sulfonylurea      Thiazolidinedione      GLP-1 RA Ozempic 1mg QW     SGLT-2 inhibitors Jardiance 25mg D     Basal insulin Toujeo insulin 30 units D     Fixed Dose  Combinations      Bolus insulin        Subjective   I use a DexCom at home. Why can't I use it here?     Patient reports the following home diabetes self-care practices:  Eating pattern  [x] Breakfast Coffee with [employer] coffee creamers  [x] Lunch  Maldives meal (burrito)  [x] Dinner  [employer] with brussell sprouts with coffee  Physical activity pattern  [x] Aerobic exercise Goes to gym 3XW  Monitoring pattern-Uses DexCom CGM to monitor BG  Taking medications pattern  [x] Consistent administration  [x] Affordable    Objective   Physical exam  General Alert, oriented and in no acute distress. Conversant and cooperative. Spoke with patient over the phone  Vital Signs Afebrile. HRR. Hypertensive.   Visit Vitals  BP (!) 135/94   Pulse 93   Temp 97.9 °F (36.6 °C) Resp 18   Ht 6' 1\" (1.854 m)   Wt 104.1 kg (229 lb 8 oz)   SpO2 91%   BMI 30.28 kg/m²     Laboratory  Lab Results   Component Value Date/Time    Hemoglobin A1c 6.8 (H) 09/12/2020 09:59 PM    Hemoglobin A1c (POC) 12.3 01/14/2014 10:18 AM     No results found for: LDL, LDLC, DLDLP  Lab Results   Component Value Date/Time    Creatinine 0.76 09/14/2020 05:09 AM     Lab Results   Component Value Date/Time    Sodium 138 09/14/2020 05:09 AM    Potassium 4.3 09/14/2020 05:09 AM    Chloride 105 09/14/2020 05:09 AM    CO2 24 09/14/2020 05:09 AM    Anion gap 9 09/14/2020 05:09 AM    Glucose 155 (H) 09/14/2020 05:09 AM    BUN 22 (H) 09/14/2020 05:09 AM    Creatinine 0.76 09/14/2020 05:09 AM    BUN/Creatinine ratio 29 (H) 09/14/2020 05:09 AM    GFR est AA >60 09/14/2020 05:09 AM    GFR est non-AA >60 09/14/2020 05:09 AM    Calcium 8.6 09/14/2020 05:09 AM    Bilirubin, total 0.6 09/13/2020 05:26 AM    Alk.  phosphatase 81 09/13/2020 05:26 AM    Protein, total 8.8 (H) 09/13/2020 05:26 AM    Albumin 3.2 (L) 09/13/2020 05:26 AM    Globulin 5.6 (H) 09/13/2020 05:26 AM    A-G Ratio 0.6 (L) 09/13/2020 05:26 AM    ALT (SGPT) 36 09/13/2020 05:26 AM     Lab Results   Component Value Date/Time    ALT (SGPT) 36 09/13/2020 05:26 AM       Factors affecting BG pattern  Factor Dose Comments   Nutrition:  Carb-controlled meals   60 grams/meal   Nothing documented   Drugs:  Steroids    Dexamethasone 6mg D X10 doses   First dose 9/12/20   Pain  0   Infection: COVID + Steroid  Remdisivir (to start)    Blood glucose pattern        Assessment and Plan   Nursing Diagnosis Risk for unstable blood glucose pattern   Nursing Intervention Domain 5250 Decision-making Support   Nursing Interventions Examined current inpatient diabetes control   Explored factors facilitating and impeding inpatient management  Identified self-management practices impeding diabetes control  Instructed patient in issue with use of CGM as an inpatient - Not approved by FDA so hospital policy prohibits     Evaluation   This gentleman, with Type 2 diabetes, has achieved inpatient blood glucose target of 100-180mg/dl on basal insulin alone. Inpatient blood glucose management has been impacted by  [x] Glucocorticoid use  Since the diabetes therapies (e.g. Metformin, Jardiance and Ozempic) he uses at home are not suitable for inpatient use, would add mealtime insulin. Recommendations   Recommend:    Use home Basal insulin dose => Lantus insulin 30 units D    Bolus insulin  [x] Normal sensitivity = Humalog insulin 6 units with meals    Corrective insulin  [x] HIGH sensitivity    [x] Will require insulin adjustments as glucocorticoids are in use     Billing Code(s)   I personally reviewed chart, notes, data and current medications in the medical record, and examined the patient at bedside before making care recommendations.      [x] [id number] IP subsequent hospital care - 30 minutes      LEXI Howe  Program for Diabetes Health  Access via [employer]

## 2020-09-14 NOTE — PROGRESS NOTES
Name: [id number]: [address]   : 1970 Admit Date: 2020   Phone: [phone]  Room: [facility]/   PCP: Nishant Moss NP  MRN: [id number]   Date: 2020  Code: Full Code        HPI:    No acute events overnight  No acute distress  No acute complaints    Remdesivir Initiation Note    Shannon Montano meets criteria for initiation of remdesivir under the emergency use authorization (EUA) based on the following:   Known or suspected COVID-19   Severe disease (SpO2 ? 94% on RA, requiring supplemental O2, or requiring invasive mechanical ventilation)   Acceptable renal function  o CrCl ? 30 ml/min based on SCr obtained prior to initiation OR   o CrCl < 30 ml/min but the potential benefit of remdesivir outweighs the risk   Acceptable hepatic function (ALT within 5 times ULN)    I have discussed with the patient/proxy information consistent with the Fact Sheet for Patients and Parents/Caregivers\" and the patient/proxy has agreed to initiating remdesivir after being:   Given the Fact Sheet for Patients and Parents/Caregivers   Informed of the alternatives to receiving remdesivir, and    Informed that remdesivir is an unapproved drug that is authorized for use under EUA    Liver function tests will be monitored daily while on remdesivir.         Current Facility-Administered Medications   Medication Dose Route Frequency    cefTRIAXone (ROCEPHIN) 2 g in 0.9% sodium chloride (MBP/ADV) 50 mL  2 g IntraVENous Q24H    azithromycin (ZITHROMAX) 500 mg in 0.9% sodium chloride (MBP/ADV) 250 mL  500 mg IntraVENous Q24H    acetaminophen (TYLENOL) tablet 650 mg  650 mg Oral Q6H PRN    ondansetron (ZOFRAN) injection 4 mg  4 mg IntraVENous Q4H PRN    atorvastatin (LIPITOR) tablet 40 mg  40 mg Oral DAILY    famotidine (PEPCID) tablet 20 mg  20 mg Oral BID    insulin glargine (LANTUS) injection 32 Units  32 Units SubCUTAneous QHS    sodium chloride (NS) flush 5-40 mL  5-40 mL IntraVENous Q8H    sodium chloride (NS) flush 5-40 mL  5-40 mL IntraVENous PRN    acetaminophen (TYLENOL) tablet 650 mg  650 mg Oral Q6H PRN    Or    acetaminophen (TYLENOL) suppository 650 mg  650 mg Rectal Q6H PRN    polyethylene glycol (MIRALAX) packet 17 g  17 g Oral DAILY PRN    promethazine (PHENERGAN) tablet 12.5 mg  12.5 mg Oral Q6H PRN    Or    ondansetron (ZOFRAN) injection 4 mg  4 mg IntraVENous Q6H PRN    ascorbic acid (vitamin C) (VITAMIN C) tablet 1,000 mg  1,000 mg Oral DAILY    zinc sulfate (ZINCATE) 220 (50) mg capsule 220 mg  220 mg Oral DAILY    insulin lispro (HUMALOG) injection   SubCUTAneous AC&HS    glucose chewable tablet 16 g  4 Tab Oral PRN    dextrose (D50W) injection syrg 12.5-25 g  12.5-25 g IntraVENous PRN    glucagon (GLUCAGEN) injection 1 mg  1 mg IntraMUSCular PRN    enoxaparin (LOVENOX) injection 30 mg  30 mg SubCUTAneous Q12H    dexAMETHasone (DECADRON) tablet 6 mg  6 mg Oral DAILY         REVIEW OF SYSTEMS   Negative except as stated in the HPI. Physical Exam:   Visit Vitals  BP (!) 133/90   Pulse (!) 101   Temp 98 °F (36.7 °C)   Resp 18   Ht 6' 1\" (1.854 m)   Wt 104.1 kg (229 lb 8 oz)   SpO2 96%   BMI 30.28 kg/m²     Patient is COVID-19 positive and examination was deferred. General:  Alert, cooperative, no distress, appears stated age. Head:  atraumatic.                                                Skin: Skin color, texture, turgor normal. No rashes or lesions       Neurologic: Grossly nonfocal       Lab Results   Component Value Date/Time    Sodium 138 09/14/2020 05:09 AM    Potassium 4.3 09/14/2020 05:09 AM    Chloride 105 09/14/2020 05:09 AM    CO2 24 09/14/2020 05:09 AM    BUN 22 (H) 09/14/2020 05:09 AM    Creatinine 0.76 09/14/2020 05:09 AM    Glucose 155 (H) 09/14/2020 05:09 AM    Calcium 8.6 09/14/2020 05:09 AM    Magnesium 2.6 (H) 09/13/2020 05:26 AM       Lab Results   Component Value Date/Time    WBC 6.7 09/14/2020 05:09 AM    HGB 14.1 09/14/2020 05:09 AM    PLATELET 963 56/39/6960 05:09 AM    MCV 87.5 09/14/2020 05:09 AM       Lab Results   Component Value Date/Time    INR 1.0 09/13/2020 05:26 AM    aPTT 29.3 09/12/2020 09:59 PM    Alk. phosphatase 81 09/13/2020 05:26 AM    Protein, total 8.8 (H) 09/13/2020 05:26 AM    Albumin 3.2 (L) 09/13/2020 05:26 AM    Globulin 5.6 (H) 09/13/2020 05:26 AM       Lab Results   Component Value Date/Time    Ferritin 1,511 (H) 09/13/2020 05:26 AM       Lab Results   Component Value Date/Time    Troponin-I, Qt. <0.05 09/12/2020 09:59 PM        IMPRESSION:  ===========  -COVID-19 positive. -COVID-19 pneumonia/ pneumonitis. -Acute hypoxemic respiratory failure - on 2 lpm by NC.  -DM. PLAN:  =====  -O2 supplementation.  -Follow O2 requirement.  -vitamin c and zinc.  -Rocephin and zithromac.  -decadron IV.  -I have discussed possibility of starting Remdesevir and he agrees  -D/c rocephin. -BS control > 180. [id number] Bita Cruz from my stand point to bring his CGI for BS monitoring.  -D-dimer 0.06. dvt ppx regular dose.       Lucio Osborne MD

## 2020-09-14 NOTE — PROGRESS NOTES
Problem: Risk for Spread of Infection  Goal: Prevent transmission of infectious organism to others  Description: Prevent the transmission of infectious organisms to other patients, staff members, and visitors. Outcome: Progressing Towards Goal     Problem: Patient Education:  Go to Education Activity  Goal: Patient/Family Education  Outcome: Progressing Towards Goal     Problem: Falls - Risk of  Goal: *Absence of Falls  Description: Document Inna Berrios Fall Risk and appropriate interventions in the flowsheet.   Outcome: Progressing Towards Goal  Note: Fall Risk Interventions:            Medication Interventions: Teach patient to arise slowly                   Problem: Patient Education: Go to Patient Education Activity  Goal: Patient/Family Education  Outcome: Progressing Towards Goal

## 2020-09-14 NOTE — PROGRESS NOTES
Orthopedic End of Shift Note    Bedside and Verbal shift change report given to Gio Plascencia (oncoming nurse) by eTrrie Yao (offgoing nurse). Report included the following information SBAR, Kardex, Intake/Output, MAR, Recent Results, Med Rec Status and Cardiac Rhythm Sinus Tach.      POD#     Significant issues during shift: n/a    Issues for Physician to address: n/a    Activity This Shift  (check all that apply) [] chair  [] dangle   [x] bathroom  [] bedside commode [] hallway  [] bedrest   Nausea/Vomiting [] yes [x] no     Voiding Status [x] void [] Liao [] I&O Cath   Bowel Movements [] yes [x] no     Foot Pumps or SCD [] yes [x] no    Ice Pack [] yes    [x] no    Incentive Spirometer [] yes [x] no Volume:       Telemetry Monitoring   [x] yes [] no Rhythm:Sinus Tach   Supplemental O2 [x] yes [] no Sat off O2:   88%

## 2020-09-14 NOTE — PROGRESS NOTES
Transition of Care  -Will need to assess O2 needs   -PCP follow-up         CM acknowledge inpatient admission. CM will follow pt for consults and any needs prior to discharge. If any concerns or questions arise pertaining to pt discharge, please contact unit CM.         Juan Carlos, [address], [address] [facility]

## 2020-09-14 NOTE — PROGRESS NOTES
I reviewed pertinent labs and imaging, and discussed /agreed on the plan of care with Dr. Bruce Beltrán. Hospitalist Progress Note    NAME: Lexy Guzman   :  1970   MRN:  [id number]       Assessment / Plan:  Acute respiratory failure due to COVID-19  poa  tested positive for Covid-19 on 20 as an out pt. Pneumonia of both lungs due to infectious organism  POA  Acute respiratory failure with hypoxia   POA  - COVID PCR Positive    - hypoxia noted to  88% on room air   - 0n 4  L NC sats at 97%   - STEWARD noted   -cont Zithromax , DC ceftriaonxe since procal normal  - Appreciate Pulmonary input   - appreciate Infectious disease input   - pt agreeable to Remdesivir  Today    -  >275 Procal - <0.05   D-Dimer 0.60>.0.36  Ferritin 1285     CRP 20.00 Fibrinogen >800  - Encourage incentive spirometry  - Albuterol prn     CXR:  FINDINGS: AP portable imaging of the chest performed at 10:20 PM demonstrates a  normal cardiomediastinal silhouette. There is patchy bibasilar airspace disease. No significant osseous abnormalities are seen. DM-  Cont. ssi   -hgb A1c  6.8  - add Humalog 6 units with meals   GERD Cont PPI          / Body mass index is 30.28 kg/m². Code status: Full  Prophylaxis: Lovenox  Recommended Disposition: Home w/Family     Subjective:     Chief Complaint / Reason for Physician Visit  \"I hate the food here \". Discussed with RN events overnight. Pt doig well on NC has cough no sputum     Review of Systems:  Symptom Y/N Comments  Symptom Y/N Comments   Fever/Chills n   Chest Pain n    Poor Appetite n   Edema n    Cough y   Abdominal Pain     Sputum n   Joint Pain     SOB/STEWARD y   Pruritis/Rash     Nausea/vomit n   Tolerating PT/OT     Diarrhea n   Tolerating Diet y    Constipation    Other       Could NOT obtain due to:      Objective:     VITALS:   Last 24hrs VS reviewed since prior progress note.  Most recent are:  Patient Vitals for the past 24 hrs:   Temp Pulse Resp BP SpO2   20 1516 97.7 °F (36.5 °C) 92 18 (!) 119/90 97 %   09/14/20 1113 97.9 °F (36.6 °C) 93 18 (!) 135/94 91 %   09/14/20 0854 98 °F (36.7 °C) (!) 101 18 (!) 133/90 96 %   09/14/20 0426 98.4 °F (36.9 °C) 93 16 111/88 94 %   09/14/20 0000 -- (!) 103 -- -- --   09/13/20 2340 98.3 °F (36.8 °C) 96 16 122/85 92 %   09/13/20 1951 98.3 °F (36.8 °C) 98 16 (!) 122/94 91 %       Intake/Output Summary (Last 24 hours) at 9/14/2020 1840  Last data filed at 9/13/2020 2340  Gross per 24 hour   Intake 600 ml   Output --   Net 600 ml        PHYSICAL EXAM:  General: WD, WN. Alert, cooperative, no acute distress    EENT:  EOMI. Anicteric sclerae. MMM  Resp:  , no wheezing or rales. No accessory muscle use  CV:  s1S2,  No edema  GI:  Soft, Non distended, Non tender.  +Bowel sounds  Neurologic:  Alert and oriented X 3, normal speech,   Psych:   Good insight. Not anxious nor agitated  Skin:  No rashes. No jaundice    Reviewed most current lab test results and cultures  YES  Reviewed most current radiology test results   YES  Review and summation of old records today    NO  Reviewed patient's current orders and MAR    YES  PMH/ reviewed - no change compared to H&P  ________________________________________________________________________  Care Plan discussed with:    Comments   Patient x    Family      RN x    Care Manager     Consultant                        Multidiciplinary team rounds were held today with , nursing, pharmacist and clinical coordinator. Patient's plan of care was discussed; medications were reviewed and discharge planning was addressed. ________________________________________________________________________  Total NON critical care TIME:  30   Minutes    Total CRITICAL CARE TIME Spent:   Minutes non procedure based      Comments   >50% of visit spent in counseling and coordination of care     ________________________________________________________________________  Carlos Mazariegos.  Chris Ware NP     Procedures: see electronic medical records for all procedures/Xrays and details which were not copied into this note but were reviewed prior to creation of Plan. LABS:  I reviewed today's most current labs and imaging studies. Pertinent labs include:  Recent Labs     09/14/20  0509 09/13/20 0526 09/12/20 2159   WBC 6.7 7.3 6.7   HGB 14.1 16.8 16.1   HCT 44.1 51.7* 49.0    274 249     Recent Labs     09/14/20  1705 09/14/20  0509 09/13/20 0526 09/12/20 2159    138 135* 135*   K 4.0 4.3 4.8 3.9    105 101 99   CO2 26 24 30 30   * 155* 136* 130*   BUN 22* 22* 19 20   CREA 0.77 0.76 1.01 1.19   CA 8.9 8.6 9.6 9.1   MG  --   --  2.6*  --    ALB 2.9*  --  3.2* 3.0*   TBILI 0.5  --  0.6 0.8   ALT 34  --  36 32   INR  --   --  1.0 1.0       Signed: Emilia Ware NP

## 2020-09-15 LAB
ALBUMIN SERPL-MCNC: 3 G/DL (ref 3.5–5)
ALBUMIN/GLOB SERPL: 0.8 {RATIO} (ref 1.1–2.2)
ALP SERPL-CCNC: 66 U/L (ref 45–117)
ALT SERPL-CCNC: 40 U/L (ref 12–78)
ANION GAP SERPL CALC-SCNC: 5 MMOL/L (ref 5–15)
AST SERPL-CCNC: 35 U/L (ref 15–37)
BILIRUB SERPL-MCNC: 0.6 MG/DL (ref 0.2–1)
BUN SERPL-MCNC: 23 MG/DL (ref 6–20)
BUN/CREAT SERPL: 29 (ref 12–20)
CALCIUM SERPL-MCNC: 8.8 MG/DL (ref 8.5–10.1)
CHLORIDE SERPL-SCNC: 106 MMOL/L (ref 97–108)
CO2 SERPL-SCNC: 26 MMOL/L (ref 21–32)
CREAT SERPL-MCNC: 0.79 MG/DL (ref 0.7–1.3)
D DIMER PPP FEU-MCNC: 0.32 MG/L FEU (ref 0–0.65)
ERYTHROCYTE [DISTWIDTH] IN BLOOD BY AUTOMATED COUNT: 12.9 % (ref 11.5–14.5)
FIBRINOGEN PPP-MCNC: 637 MG/DL (ref 200–475)
GLOBULIN SER CALC-MCNC: 4 G/DL (ref 2–4)
GLUCOSE BLD STRIP.AUTO-MCNC: 169 MG/DL (ref 65–100)
GLUCOSE BLD STRIP.AUTO-MCNC: 177 MG/DL (ref 65–100)
GLUCOSE BLD STRIP.AUTO-MCNC: 228 MG/DL (ref 65–100)
GLUCOSE BLD STRIP.AUTO-MCNC: 92 MG/DL (ref 65–100)
GLUCOSE SERPL-MCNC: 157 MG/DL (ref 65–100)
HCT VFR BLD AUTO: 44.1 % (ref 36.6–50.3)
HGB BLD-MCNC: 14.1 G/DL (ref 12.1–17)
IL6 SERPL-MCNC: 2.6 PG/ML (ref 0–15.5)
LDH SERPL L TO P-CCNC: 282 U/L (ref 85–241)
MCH RBC QN AUTO: 27.9 PG (ref 26–34)
MCHC RBC AUTO-ENTMCNC: 32 G/DL (ref 30–36.5)
MCV RBC AUTO: 87.3 FL (ref 80–99)
NRBC # BLD: 0 K/UL (ref 0–0.01)
NRBC BLD-RTO: 0 PER 100 WBC
PLATELET # BLD AUTO: 347 K/UL (ref 150–400)
PMV BLD AUTO: 9.9 FL (ref 8.9–12.9)
POTASSIUM SERPL-SCNC: 4.3 MMOL/L (ref 3.5–5.1)
PROT SERPL-MCNC: 7 G/DL (ref 6.4–8.2)
RBC # BLD AUTO: 5.05 M/UL (ref 4.1–5.7)
SERVICE CMNT-IMP: ABNORMAL
SERVICE CMNT-IMP: NORMAL
SODIUM SERPL-SCNC: 137 MMOL/L (ref 136–145)
WBC # BLD AUTO: 8.2 K/UL (ref 4.1–11.1)

## 2020-09-15 PROCEDURE — 74011636637 HC RX REV CODE- 636/637: Performed by: NURSE PRACTITIONER

## 2020-09-15 PROCEDURE — 94760 N-INVAS EAR/PLS OXIMETRY 1: CPT

## 2020-09-15 PROCEDURE — 74011250636 HC RX REV CODE- 250/636: Performed by: HOSPITALIST

## 2020-09-15 PROCEDURE — 74011250637 HC RX REV CODE- 250/637: Performed by: HOSPITALIST

## 2020-09-15 PROCEDURE — 85027 COMPLETE CBC AUTOMATED: CPT

## 2020-09-15 PROCEDURE — 74011636637 HC RX REV CODE- 636/637: Performed by: INTERNAL MEDICINE

## 2020-09-15 PROCEDURE — 80053 COMPREHEN METABOLIC PANEL: CPT

## 2020-09-15 PROCEDURE — 99232 SBSQ HOSP IP/OBS MODERATE 35: CPT | Performed by: CLINICAL NURSE SPECIALIST

## 2020-09-15 PROCEDURE — 74011000250 HC RX REV CODE- 250: Performed by: INTERNAL MEDICINE

## 2020-09-15 PROCEDURE — 36415 COLL VENOUS BLD VENIPUNCTURE: CPT

## 2020-09-15 PROCEDURE — 82962 GLUCOSE BLOOD TEST: CPT

## 2020-09-15 PROCEDURE — 83615 LACTATE (LD) (LDH) ENZYME: CPT

## 2020-09-15 PROCEDURE — 74011000258 HC RX REV CODE- 258: Performed by: INTERNAL MEDICINE

## 2020-09-15 PROCEDURE — 85384 FIBRINOGEN ACTIVITY: CPT

## 2020-09-15 PROCEDURE — 85379 FIBRIN DEGRADATION QUANT: CPT

## 2020-09-15 PROCEDURE — 65660000000 HC RM CCU STEPDOWN

## 2020-09-15 PROCEDURE — 74011636637 HC RX REV CODE- 636/637: Performed by: HOSPITALIST

## 2020-09-15 PROCEDURE — 74011250637 HC RX REV CODE- 250/637: Performed by: INTERNAL MEDICINE

## 2020-09-15 RX ORDER — INSULIN LISPRO 100 [IU]/ML
8 INJECTION, SOLUTION INTRAVENOUS; SUBCUTANEOUS
Status: DISCONTINUED | OUTPATIENT
Start: 2020-09-15 | End: 2020-09-16

## 2020-09-15 RX ORDER — GUAIFENESIN 600 MG/1
600 TABLET, EXTENDED RELEASE ORAL EVERY 12 HOURS
Status: DISCONTINUED | OUTPATIENT
Start: 2020-09-15 | End: 2020-09-18 | Stop reason: HOSPADM

## 2020-09-15 RX ORDER — BENZONATATE 100 MG/1
200 CAPSULE ORAL
Status: DISCONTINUED | OUTPATIENT
Start: 2020-09-15 | End: 2020-09-18 | Stop reason: HOSPADM

## 2020-09-15 RX ADMIN — ATORVASTATIN CALCIUM 40 MG: 40 TABLET, FILM COATED ORAL at 10:11

## 2020-09-15 RX ADMIN — GUAIFENESIN 600 MG: 600 TABLET, EXTENDED RELEASE ORAL at 21:30

## 2020-09-15 RX ADMIN — AZITHROMYCIN 500 MG: 500 INJECTION, POWDER, LYOPHILIZED, FOR SOLUTION INTRAVENOUS at 21:31

## 2020-09-15 RX ADMIN — INSULIN GLARGINE 32 UNITS: 100 INJECTION, SOLUTION SUBCUTANEOUS at 21:30

## 2020-09-15 RX ADMIN — FAMOTIDINE 20 MG: 20 TABLET, FILM COATED ORAL at 17:14

## 2020-09-15 RX ADMIN — SODIUM CHLORIDE 100 MG: 9 INJECTION, SOLUTION INTRAVENOUS at 17:13

## 2020-09-15 RX ADMIN — INSULIN LISPRO 3 UNITS: 100 INJECTION, SOLUTION INTRAVENOUS; SUBCUTANEOUS at 13:09

## 2020-09-15 RX ADMIN — FAMOTIDINE 20 MG: 20 TABLET, FILM COATED ORAL at 10:10

## 2020-09-15 RX ADMIN — ENOXAPARIN SODIUM 30 MG: 30 INJECTION SUBCUTANEOUS at 13:09

## 2020-09-15 RX ADMIN — GUAIFENESIN 600 MG: 600 TABLET, EXTENDED RELEASE ORAL at 13:10

## 2020-09-15 RX ADMIN — Medication 5 ML: at 21:32

## 2020-09-15 RX ADMIN — ZINC SULFATE 220 MG (50 MG) CAPSULE 220 MG: CAPSULE at 10:10

## 2020-09-15 RX ADMIN — OXYCODONE HYDROCHLORIDE AND ACETAMINOPHEN 1000 MG: 500 TABLET ORAL at 10:10

## 2020-09-15 RX ADMIN — INSULIN LISPRO 2 UNITS: 100 INJECTION, SOLUTION INTRAVENOUS; SUBCUTANEOUS at 10:09

## 2020-09-15 RX ADMIN — INSULIN LISPRO 8 UNITS: 100 INJECTION, SOLUTION INTRAVENOUS; SUBCUTANEOUS at 13:09

## 2020-09-15 RX ADMIN — INSULIN LISPRO 6 UNITS: 100 INJECTION, SOLUTION INTRAVENOUS; SUBCUTANEOUS at 10:09

## 2020-09-15 RX ADMIN — ENOXAPARIN SODIUM 30 MG: 30 INJECTION SUBCUTANEOUS at 23:00

## 2020-09-15 RX ADMIN — DEXAMETHASONE 6 MG: 4 TABLET ORAL at 21:30

## 2020-09-15 RX ADMIN — Medication 10 ML: at 10:11

## 2020-09-15 RX ADMIN — Medication 10 ML: at 13:10

## 2020-09-15 NOTE — PROGRESS NOTES
I reviewed pertinent labs and imaging, and discussed /agreed on the plan of care with Dr. Jo-Ann Palumbo. Hospitalist Progress Note    NAME: Roni Reaves   :  1970   MRN:  [id number]       Assessment / Plan:  Acute respiratory failure due to COVID19 PNA  CXR showed Patchy bilateral airspace disease likely represents pneumonia  Cont' azithromycin, off rocephin  Cont' remdesivir and decadrone  Add mucinex BID, tesalon perls prn  Albuterol prn    T2DM  hgb A1c  6.8  lantus 32 units qhs, Lispro premeals, titrate prn  SSI    GERD Cont PPI          / Body mass index is 30.28 kg/m². Code status: Full  Prophylaxis: Lovenox  Recommended Disposition: Home w/Family     Subjective:     Chief Complaint / Reason for Physician Visit  Pt seen, up ambulating around room. \"I feel the same\". Coughing up some sputum but swallows back down. Review of Systems:  Symptom Y/N Comments  Symptom Y/N Comments   Fever/Chills n   Chest Pain n    Poor Appetite    Edema     Cough y   Abdominal Pain     Sputum y   Joint Pain     SOB/STEWARD y   Pruritis/Rash     Nausea/vomit n   Tolerating PT/OT     Diarrhea n   Tolerating Diet y    Constipation    Other       Could NOT obtain due to:      Objective:     VITALS:   Last 24hrs VS reviewed since prior progress note. Most recent are:  Patient Vitals for the past 24 hrs:   Temp Pulse Resp BP SpO2   09/15/20 0758 97.8 °F (36.6 °C) 93 17 112/82 91 %   09/15/20 0448 97.9 °F (36.6 °C) 89 16 115/86 90 %   09/15/20 0000 -- 96 -- -- --   20 2352 98.4 °F (36.9 °C) 94 16 118/84 92 %   20 2001 98.2 °F (36.8 °C) 91 18 (!) 140/89 95 %   20 1516 97.7 °F (36.5 °C) 92 18 (!) 119/90 97 %   20 1113 97.9 °F (36.6 °C) 93 18 (!) 135/94 91 %     No intake or output data in the 24 hours ending 09/15/20 0954     PHYSICAL EXAM:  General: WD, WN. Alert, cooperative, no acute distress    EENT:  EOMI. Anicteric sclerae.  MMM  Resp:  No wheezing  No accessory muscle use  CV:  Regular rhythm,  No edema  GI:  Soft, Non distended, Non tender.  +Bowel sounds  Neurologic:  Alert and oriented X 3, normal speech  Psych:   Good insight. Not anxious nor agitated  Skin:  No rashes. No jaundice    Reviewed most current lab test results and cultures  YES  Reviewed most current radiology test results   YES  Review and summation of old records today    NO  Reviewed patient's current orders and MAR    YES  PMH/SH reviewed - no change compared to H&P  ________________________________________________________________________  Care Plan discussed with:    Comments   Patient x    Family      RN x    Care Manager     Consultant                        Multidiciplinary team rounds were held today with , nursing, pharmacist and clinical coordinator. Patient's plan of care was discussed; medications were reviewed and discharge planning was addressed. ________________________________________________________________________  Total NON critical care TIME:  30   Minutes    Total CRITICAL CARE TIME Spent:   Minutes non procedure based      Comments   >50% of visit spent in counseling and coordination of care     ________________________________________________________________________  Sary Kamara MD     Procedures: see electronic medical records for all procedures/Xrays and details which were not copied into this note but were reviewed prior to creation of Plan. LABS:  I reviewed today's most current labs and imaging studies.   Pertinent labs include:  Recent Labs     09/15/20  0450 09/14/20  0509 09/13/20  0526   WBC 8.2 6.7 7.3   HGB 14.1 14.1 16.8   HCT 44.1 44.1 51.7*    305 274     Recent Labs     09/15/20  0450 09/14/20  1705 09/14/20  0509 09/13/20  0526 09/12/20  2159    136 138 135* 135*   K 4.3 4.0 4.3 4.8 3.9    104 105 101 99   CO2 26 26 24 30 30   * 145* 155* 136* 130*   BUN 23* 22* 22* 19 20   CREA 0.79 0.77 0.76 1.01 1.19   CA 8.8 8.9 8.6 9.6 9.1   MG  --   --   --  2.6*  -- ALB 3.0* 2.9*  --  3.2* 3.0*   TBILI 0.6 0.5  --  0.6 0.8   ALT 40 34  --  36 32   INR  --   --   --  1.0 1.0       Signed: Justice Reyna MD

## 2020-09-15 NOTE — DIABETES MGMT
JAKY [facility]  CLINICAL NURSE SPECIALIST CONSULT  PROGRAM FOR DIABETES HEALTH    PROGRESS NOTE    Presentation   Boy Hollins is a 44 y.o. male admitted from the ER 9/12/20 with dyspnea X2 days PTA. Admits to fevers. COVID +. HX: Type 2 diabetes. Hypercholesterolemia. GERD. Sleep apnea. DX: TDRLW-29 +. TX: Steroid. Vitamin C. Zinc. Azithromycin. Remdisivir. Current clinical course has been uncomplicated. 9/15/20   More energy. Cough +. Didn't sleep well last evening due to girlfriend's trip to ER. Fibrinogen 637. . Urine legionella Negative. Mealtime insulin added today. Diabetes: Patient has known Type 2 diabetes X15 years, treated with insulin & non-insulin agents PTA. Family history unknown for diabetes. Seen by Gilberto Disla MD (endocrinologist). Has lost 80 lbs purposely and as a result has reduced his insulin dose. Consulted by Provider for advanced diabetes nursing assessment and care, specifically related to   [x] Inpatient management strategy    Diabetes-related medical history-deferred at this time    Diabetes medication history  Drug class Currently in use Discontinued Never used   Biguanide Metformin 1000mg twice daily     DDP-4 inhibitor       Sulfonylurea      Thiazolidinedione      GLP-1 RA Ozempic 1mg QW     SGLT-2 inhibitors Jardiance 25mg D     Basal insulin Toujeo insulin 30 units D     Fixed Dose  Combinations      Bolus insulin        Subjective   I didn't sleep that well last night. My girlfriend came to the ER and left with Ativan. She will be home alone now, and she doesn't do well with new medications.     Patient reports the following home diabetes self-care practices:  Eating pattern  [x] Breakfast Coffee with [employer] coffee creamers  [x] Lunch  Maldives meal (burrito)  [x] Dinner  [employer] with [employer] with coffee  Physical activity pattern  [x] Aerobic exercise Goes to gym 3XW  Monitoring pattern-Uses DexCom CGM to monitor BG  Taking medications pattern  [x] Consistent administration  [x] Affordable    Objective   Physical exam  General Alert, oriented and in no acute distress. Conversant and cooperative. Spoke with patient over the phone  Vital Signs Afebrile. HRR. Normotensive. Visit Vitals  /87   Pulse 92   Temp 98.7 °F (37.1 °C)   Resp (!) 109   Ht 6' 1\" (1.854 m)   Wt 104.1 kg (229 lb 8 oz)   SpO2 92%   BMI 30.28 kg/m²     Laboratory  Lab Results   Component Value Date/Time    Hemoglobin A1c 6.8 (H) 09/12/2020 09:59 PM    Hemoglobin A1c (POC) 12.3 01/14/2014 10:18 AM     No results found for: LDL, LDLC, DLDLP  Lab Results   Component Value Date/Time    Creatinine 0.79 09/15/2020 04:50 AM     Lab Results   Component Value Date/Time    Sodium 137 09/15/2020 04:50 AM    Potassium 4.3 09/15/2020 04:50 AM    Chloride 106 09/15/2020 04:50 AM    CO2 26 09/15/2020 04:50 AM    Anion gap 5 09/15/2020 04:50 AM    Glucose 157 (H) 09/15/2020 04:50 AM    BUN 23 (H) 09/15/2020 04:50 AM    Creatinine 0.79 09/15/2020 04:50 AM    BUN/Creatinine ratio 29 (H) 09/15/2020 04:50 AM    GFR est AA >60 09/15/2020 04:50 AM    GFR est non-AA >60 09/15/2020 04:50 AM    Calcium 8.8 09/15/2020 04:50 AM    Bilirubin, total 0.6 09/15/2020 04:50 AM    Alk.  phosphatase 66 09/15/2020 04:50 AM    Protein, total 7.0 09/15/2020 04:50 AM    Albumin 3.0 (L) 09/15/2020 04:50 AM    Globulin 4.0 09/15/2020 04:50 AM    A-G Ratio 0.8 (L) 09/15/2020 04:50 AM    ALT (SGPT) 40 09/15/2020 04:50 AM     Lab Results   Component Value Date/Time    ALT (SGPT) 40 09/15/2020 04:50 AM       Factors affecting BG pattern  Factor Dose Comments   Nutrition:  Carb-controlled meals   60 grams/meal   Nothing documented   Drugs:  Steroids    Dexamethasone 6mg D X10 doses   First dose 9/12/20   Infection: COVID + Steroid  Vitamin C  Zinc  Remdisivir (to start)    Blood glucose pattern        Assessment and Plan   Nursing Diagnosis Risk for unstable blood glucose pattern   Nursing Intervention Domain 12 Decision-making Support   Nursing Interventions Examined current inpatient diabetes control   Explored factors facilitating and impeding inpatient management     Evaluation   This gentleman, with Type 2 diabetes, has achieved inpatient blood glucose target of 100-180mg/dl. Except for pre-lunch today. Inpatient blood glucose management has been impacted by  [x] Glucocorticoid use  We cannot use the diabetes therapies (e.g. Metformin, Jardiance and Ozempic) that he normally uses at home. Mealtime insulin added today. Recommendations   Recommend: Will monitor over the next 24 hours to see if both basal & bolus insulin dosing needs to increase    [x] Will require insulin adjustments as glucocorticoids are in use     Billing Code(s)   I personally reviewed chart, notes, data and current medications in the medical record, and examined the patient at bedside before making care recommendations.      [x] [id number]  subsequent hospital care - 30 minutes      LEXI Olivares  Program for Diabetes Health  Access via [employer]

## 2020-09-15 NOTE — PROGRESS NOTES
Bedside and Verbal shift change report given to Chandler VALLE (oncoming nurse) by Rafia Carpenter (offgoing nurse). Report included the following information SBAR, Kardex, Intake/Output, MAR, Recent Results, Med Rec Status and Cardiac Rhythm NSR/Sinus Tach.

## 2020-09-15 NOTE — PROGRESS NOTES
Name: [id number]: [facility]   : 1970 Admit Date: 2020   Phone: [phone]  Room: [address] [place] [address]   PCP: Latanya Perez NP  MRN: [id number]   Date: 9/15/2020  Code: Full Code        HPI:    No acute events overnight  No acute distress  No acute complaints  On O2    Remdesivir Initiation Note    Lexy Guzman meets criteria for initiation of remdesivir under the emergency use authorization (EUA) based on the following:   Known or suspected COVID-19   Severe disease (SpO2 ? 94% on RA, requiring supplemental O2, or requiring invasive mechanical ventilation)   Acceptable renal function  o CrCl ? 30 ml/min based on SCr obtained prior to initiation OR   o CrCl < 30 ml/min but the potential benefit of remdesivir outweighs the risk   Acceptable hepatic function (ALT within 5 times ULN)    I have discussed with the patient/proxy information consistent with the Fact Sheet for Patients and Parents/Caregivers\" and the patient/proxy has agreed to initiating remdesivir after being:   Given the Fact Sheet for Patients and Parents/Caregivers   Informed of the alternatives to receiving remdesivir, and    Informed that remdesivir is an unapproved drug that is authorized for use under EUA    Liver function tests will be monitored daily while on remdesivir.         Current Facility-Administered Medications   Medication Dose Route Frequency    benzonatate (TESSALON) capsule 200 mg  200 mg Oral TID PRN    remdesivir 100 mg in 0.9% sodium chloride 250 mL IVPB  100 mg IntraVENous Q24H    insulin lispro (HUMALOG) injection 6 Units  6 Units SubCUTAneous TIDAC    azithromycin (ZITHROMAX) 500 mg in 0.9% sodium chloride (MBP/ADV) 250 mL  500 mg IntraVENous Q24H    acetaminophen (TYLENOL) tablet 650 mg  650 mg Oral Q6H PRN    ondansetron (ZOFRAN) injection 4 mg  4 mg IntraVENous Q4H PRN    atorvastatin (LIPITOR) tablet 40 mg  40 mg Oral DAILY    famotidine (PEPCID) tablet 20 mg  20 mg Oral BID    insulin glargine (LANTUS) injection 32 Units  32 Units SubCUTAneous QHS    sodium chloride (NS) flush 5-40 mL  5-40 mL IntraVENous Q8H    sodium chloride (NS) flush 5-40 mL  5-40 mL IntraVENous PRN    acetaminophen (TYLENOL) tablet 650 mg  650 mg Oral Q6H PRN    Or    acetaminophen (TYLENOL) suppository 650 mg  650 mg Rectal Q6H PRN    polyethylene glycol (MIRALAX) packet 17 g  17 g Oral DAILY PRN    promethazine (PHENERGAN) tablet 12.5 mg  12.5 mg Oral Q6H PRN    Or    ondansetron (ZOFRAN) injection 4 mg  4 mg IntraVENous Q6H PRN    ascorbic acid (vitamin C) (VITAMIN C) tablet 1,000 mg  1,000 mg Oral DAILY    zinc sulfate (ZINCATE) 220 (50) mg capsule 220 mg  220 mg Oral DAILY    insulin lispro (HUMALOG) injection   SubCUTAneous AC&HS    glucose chewable tablet 16 g  4 Tab Oral PRN    dextrose (D50W) injection syrg 12.5-25 g  12.5-25 g IntraVENous PRN    glucagon (GLUCAGEN) injection 1 mg  1 mg IntraMUSCular PRN    enoxaparin (LOVENOX) injection 30 mg  30 mg SubCUTAneous Q12H    dexAMETHasone (DECADRON) tablet 6 mg  6 mg Oral DAILY         REVIEW OF SYSTEMS   Negative except as stated in the HPI. Physical Exam:   Visit Vitals  /82   Pulse 93   Temp 97.8 °F (36.6 °C)   Resp 17   Ht 6' 1\" (1.854 m)   Wt 104.1 kg (229 lb 8 oz)   SpO2 91%   BMI 30.28 kg/m²     Patient is COVID-19 positive and examination was deferred. General:  Alert, cooperative, no distress, appears stated age. Head:  atraumatic.                                                Skin: Skin color, texture, turgor normal. No rashes or lesions       Neurologic: Grossly nonfocal       Lab Results   Component Value Date/Time    Sodium 137 09/15/2020 04:50 AM    Potassium 4.3 09/15/2020 04:50 AM    Chloride 106 09/15/2020 04:50 AM    CO2 26 09/15/2020 04:50 AM    BUN 23 (H) 09/15/2020 04:50 AM    Creatinine 0.79 09/15/2020 04:50 AM    Glucose 157 (H) 09/15/2020 04:50 AM    Calcium 8.8 09/15/2020 04:50 AM Magnesium 2.6 (H) 09/13/2020 05:26 AM       Lab Results   Component Value Date/Time    WBC 8.2 09/15/2020 04:50 AM    HGB 14.1 09/15/2020 04:50 AM    PLATELET 541 21/99/6492 04:50 AM    MCV 87.3 09/15/2020 04:50 AM       Lab Results   Component Value Date/Time    INR 1.0 09/13/2020 05:26 AM    aPTT 29.3 09/12/2020 09:59 PM    Alk. phosphatase 66 09/15/2020 04:50 AM    Protein, total 7.0 09/15/2020 04:50 AM    Albumin 3.0 (L) 09/15/2020 04:50 AM    Globulin 4.0 09/15/2020 04:50 AM       Lab Results   Component Value Date/Time    Ferritin 1,511 (H) 09/13/2020 05:26 AM       Lab Results   Component Value Date/Time    Troponin-I, Qt. <0.05 09/12/2020 09:59 PM        IMPRESSION:  ===========  -COVID-19 positive. -COVID-19 pneumonia/ pneumonitis. -Acute hypoxemic respiratory failure - on 2 lpm by NC.  -DM. PLAN:  =====  -O2 supplementation.  -Follow O2 requirement.  -vitamin c and zinc.  -on zithromac.  -decadron IV. -on Remdesevir   -BS control   -dvt ppx regular dose.       Merissa Tavera MD

## 2020-09-15 NOTE — PROGRESS NOTES
Spiritual Care Assessment/Progress Note  [facility]      NAME: Nahomi Chaudhry      MRN: [id number]  AGE: 44 y.o.  SEX: male  Nondenominational Affiliation: Restorationist   Language: English     9/15/2020     Total Time (in minutes): 47     Spiritual Assessment begun in [facility] through conversation with:         [x]Patient        [] Family    [] Friend(s)        Reason for Consult: Advance medical directive consult     Spiritual beliefs: (Please include comment if needed)     [] Identifies with a hari tradition:         [] Supported by a hari community:            [] Claims no spiritual orientation:           [] Seeking spiritual identity:                [] Adheres to an individual form of spirituality:           [] Not able to assess:                         Identified resources for coping:      [] Prayer                               [] Music                  [] Guided Imagery     [x] Family/friends                 [] Pet visits     [] Devotional reading                         [] Unknown     [x] Other: Staying Busy                                         Interventions offered during this visit: (See comments for more details)    Patient Interventions: Advance medical directive completed, Initial/Spiritual assessment, patient floor         Plan of Care:     [] Support spiritual and/or cultural needs    [] Support AMD and/or advance care planning process      [] Support grieving process   [] Coordinate Rites and/or Rituals    [] Coordination with community clergy   [] No spiritual needs identified at this time   [] Detailed Plan of Care below (See Comments)  [] Make referral to Music Therapy  [] Make referral to Pet Therapy     [] Make referral to Addiction services  [] Make referral to [facility]  [] Make referral to Spiritual Care Partner  [] No future visits requested        [x] Follow up visits as needed     Comments: Responded to request from [facility] for an Advance Medical Directive (AMD) consult on Orthopedics. Consulted with patients nurse. Reviewed the AMD form in detail, and assisted patient in filling out and completing the AMD form. Patient wants friend, Kirill Guerin ([phone]), as primary decision maker and father, Col. Sary KEYES, as secondary decision maker . Discussed living will section and patient chose not to complete section. Made copies of AMD and returned the original to patient. Gave one copy to nurse for AMD to be scanned into patients chart. Processed patient family dynamics and perceptions of personhood in relation to the body and soul. Validated patient trust in recovery and desire for breath to return over time.     RAJ Pardo 1 Provider   Paging Service [phone])

## 2020-09-15 NOTE — PROGRESS NOTES
Bedside and Verbal shift change report given to Juhi DAVEY (oncoming nurse) by Latonya Castrejon (offgoing nurse). Report included the following information SBAR, Kardex, Procedure Summary, Intake/Output, MAR, Accordion and Recent Results.

## 2020-09-15 NOTE — ACP (ADVANCE CARE PLANNING)
Responded to request from [facility] for an Advance Medical Directive (AMD) consult on Orthopedics. Consulted with patients nurse. Reviewed the AMD form in detail, and assisted patient in filling out and completing the AMD form. Patient wants friend, Shyam De Los Santos ([phone]), as primary decision maker and father, Col. Franc Acevedo. Shantanu STEPHY, as secondary decision maker . Discussed living will section and patient chose not to complete section. Made copies of AMD and returned the original to patient. Gave one copy to nurse for AMD to be scanned into patients chart.     RAJ Pardo 1 Provider   Paging Service [phone]

## 2020-09-16 LAB
ALBUMIN SERPL-MCNC: 3.1 G/DL (ref 3.5–5)
ALBUMIN/GLOB SERPL: 0.7 {RATIO} (ref 1.1–2.2)
ALP SERPL-CCNC: 66 U/L (ref 45–117)
ALT SERPL-CCNC: 34 U/L (ref 12–78)
ANION GAP SERPL CALC-SCNC: 6 MMOL/L (ref 5–15)
AST SERPL-CCNC: 26 U/L (ref 15–37)
BILIRUB SERPL-MCNC: 0.8 MG/DL (ref 0.2–1)
BUN SERPL-MCNC: 23 MG/DL (ref 6–20)
BUN/CREAT SERPL: 27 (ref 12–20)
CALCIUM SERPL-MCNC: 9.4 MG/DL (ref 8.5–10.1)
CHLORIDE SERPL-SCNC: 106 MMOL/L (ref 97–108)
CO2 SERPL-SCNC: 25 MMOL/L (ref 21–32)
CREAT SERPL-MCNC: 0.85 MG/DL (ref 0.7–1.3)
D DIMER PPP FEU-MCNC: 0.25 MG/L FEU (ref 0–0.65)
ERYTHROCYTE [DISTWIDTH] IN BLOOD BY AUTOMATED COUNT: 12.8 % (ref 11.5–14.5)
FIBRINOGEN PPP-MCNC: 590 MG/DL (ref 200–475)
GLOBULIN SER CALC-MCNC: 4.6 G/DL (ref 2–4)
GLUCOSE BLD STRIP.AUTO-MCNC: 161 MG/DL (ref 65–100)
GLUCOSE BLD STRIP.AUTO-MCNC: 165 MG/DL (ref 65–100)
GLUCOSE BLD STRIP.AUTO-MCNC: 168 MG/DL (ref 65–100)
GLUCOSE BLD STRIP.AUTO-MCNC: 193 MG/DL (ref 65–100)
GLUCOSE SERPL-MCNC: 168 MG/DL (ref 65–100)
HCT VFR BLD AUTO: 45.6 % (ref 36.6–50.3)
HGB BLD-MCNC: 14.9 G/DL (ref 12.1–17)
LDH SERPL L TO P-CCNC: 289 U/L (ref 85–241)
MCH RBC QN AUTO: 28.6 PG (ref 26–34)
MCHC RBC AUTO-ENTMCNC: 32.7 G/DL (ref 30–36.5)
MCV RBC AUTO: 87.5 FL (ref 80–99)
NRBC # BLD: 0 K/UL (ref 0–0.01)
NRBC BLD-RTO: 0 PER 100 WBC
PLATELET # BLD AUTO: 381 K/UL (ref 150–400)
PMV BLD AUTO: 9.8 FL (ref 8.9–12.9)
POTASSIUM SERPL-SCNC: 4.6 MMOL/L (ref 3.5–5.1)
PROT SERPL-MCNC: 7.7 G/DL (ref 6.4–8.2)
RBC # BLD AUTO: 5.21 M/UL (ref 4.1–5.7)
SERVICE CMNT-IMP: ABNORMAL
SODIUM SERPL-SCNC: 137 MMOL/L (ref 136–145)
WBC # BLD AUTO: 9.3 K/UL (ref 4.1–11.1)

## 2020-09-16 PROCEDURE — 85379 FIBRIN DEGRADATION QUANT: CPT

## 2020-09-16 PROCEDURE — 80053 COMPREHEN METABOLIC PANEL: CPT

## 2020-09-16 PROCEDURE — 74011250637 HC RX REV CODE- 250/637: Performed by: HOSPITALIST

## 2020-09-16 PROCEDURE — 74011250637 HC RX REV CODE- 250/637: Performed by: INTERNAL MEDICINE

## 2020-09-16 PROCEDURE — 74011000258 HC RX REV CODE- 258: Performed by: INTERNAL MEDICINE

## 2020-09-16 PROCEDURE — 94760 N-INVAS EAR/PLS OXIMETRY 1: CPT

## 2020-09-16 PROCEDURE — 82962 GLUCOSE BLOOD TEST: CPT

## 2020-09-16 PROCEDURE — 85384 FIBRINOGEN ACTIVITY: CPT

## 2020-09-16 PROCEDURE — 74011636637 HC RX REV CODE- 636/637: Performed by: INTERNAL MEDICINE

## 2020-09-16 PROCEDURE — 36415 COLL VENOUS BLD VENIPUNCTURE: CPT

## 2020-09-16 PROCEDURE — 74011250636 HC RX REV CODE- 250/636: Performed by: HOSPITALIST

## 2020-09-16 PROCEDURE — 85027 COMPLETE CBC AUTOMATED: CPT

## 2020-09-16 PROCEDURE — 83615 LACTATE (LD) (LDH) ENZYME: CPT

## 2020-09-16 PROCEDURE — 74011000250 HC RX REV CODE- 250: Performed by: INTERNAL MEDICINE

## 2020-09-16 PROCEDURE — 65660000000 HC RM CCU STEPDOWN

## 2020-09-16 PROCEDURE — 77010033678 HC OXYGEN DAILY

## 2020-09-16 PROCEDURE — 74011636637 HC RX REV CODE- 636/637: Performed by: HOSPITALIST

## 2020-09-16 RX ORDER — INSULIN LISPRO 100 [IU]/ML
10 INJECTION, SOLUTION INTRAVENOUS; SUBCUTANEOUS
Status: DISCONTINUED | OUTPATIENT
Start: 2020-09-16 | End: 2020-09-18 | Stop reason: HOSPADM

## 2020-09-16 RX ADMIN — INSULIN LISPRO 10 UNITS: 100 INJECTION, SOLUTION INTRAVENOUS; SUBCUTANEOUS at 17:56

## 2020-09-16 RX ADMIN — AZITHROMYCIN 500 MG: 500 INJECTION, POWDER, LYOPHILIZED, FOR SOLUTION INTRAVENOUS at 21:00

## 2020-09-16 RX ADMIN — Medication 10 ML: at 14:24

## 2020-09-16 RX ADMIN — GUAIFENESIN 600 MG: 600 TABLET, EXTENDED RELEASE ORAL at 22:05

## 2020-09-16 RX ADMIN — SODIUM CHLORIDE 100 MG: 9 INJECTION, SOLUTION INTRAVENOUS at 17:56

## 2020-09-16 RX ADMIN — ENOXAPARIN SODIUM 30 MG: 30 INJECTION SUBCUTANEOUS at 22:06

## 2020-09-16 RX ADMIN — INSULIN LISPRO 8 UNITS: 100 INJECTION, SOLUTION INTRAVENOUS; SUBCUTANEOUS at 09:33

## 2020-09-16 RX ADMIN — FAMOTIDINE 20 MG: 20 TABLET, FILM COATED ORAL at 17:56

## 2020-09-16 RX ADMIN — GUAIFENESIN 600 MG: 600 TABLET, EXTENDED RELEASE ORAL at 09:34

## 2020-09-16 RX ADMIN — DEXAMETHASONE 6 MG: 4 TABLET ORAL at 22:05

## 2020-09-16 RX ADMIN — Medication 10 ML: at 22:05

## 2020-09-16 RX ADMIN — INSULIN LISPRO 2 UNITS: 100 INJECTION, SOLUTION INTRAVENOUS; SUBCUTANEOUS at 17:56

## 2020-09-16 RX ADMIN — OXYCODONE HYDROCHLORIDE AND ACETAMINOPHEN 1000 MG: 500 TABLET ORAL at 09:35

## 2020-09-16 RX ADMIN — ZINC SULFATE 220 MG (50 MG) CAPSULE 220 MG: CAPSULE at 09:34

## 2020-09-16 RX ADMIN — ENOXAPARIN SODIUM 30 MG: 30 INJECTION SUBCUTANEOUS at 14:24

## 2020-09-16 RX ADMIN — ATORVASTATIN CALCIUM 40 MG: 40 TABLET, FILM COATED ORAL at 09:34

## 2020-09-16 RX ADMIN — FAMOTIDINE 20 MG: 20 TABLET, FILM COATED ORAL at 09:34

## 2020-09-16 RX ADMIN — INSULIN LISPRO 8 UNITS: 100 INJECTION, SOLUTION INTRAVENOUS; SUBCUTANEOUS at 14:23

## 2020-09-16 RX ADMIN — INSULIN LISPRO 2 UNITS: 100 INJECTION, SOLUTION INTRAVENOUS; SUBCUTANEOUS at 09:34

## 2020-09-16 RX ADMIN — INSULIN GLARGINE 32 UNITS: 100 INJECTION, SOLUTION SUBCUTANEOUS at 22:04

## 2020-09-16 RX ADMIN — INSULIN LISPRO 2 UNITS: 100 INJECTION, SOLUTION INTRAVENOUS; SUBCUTANEOUS at 14:23

## 2020-09-16 RX ADMIN — Medication 5 ML: at 06:41

## 2020-09-16 NOTE — DIABETES MGMT
AJKY LYNN  CLINICAL NURSE SPECIALIST CONSULT  PROGRAM FOR DIABETES HEALTH    PROGRESS NOTE    Presentation   Nir Giron is a 44 y.o. male admitted from the ER 9/12/20 with dyspnea X2 days PTA. Admits to fevers. COVID +. HX: Type 2 diabetes. Hypercholesterolemia. GERD. Sleep apnea. DX: [id number] +. TX: Steroid. Vitamin C. Zinc. Azithromycin. Remdisivir. Current clinical course has been uncomplicated. 9/15/20   More energy. Cough +. Didn't sleep well last evening due to girlfriend's trip to ER. Fibrinogen 637. . Urine legionella Negative. Mealtime insulin added today. 9/16/20 Patient has not received meals on time today, so insulin administration has been off today. Weaning 02. Diabetes: Patient has known Type 2 diabetes X15 years, treated with insulin & non-insulin agents PTA. Family history unknown for diabetes. Seen by Aida Randhawa MD (endocrinologist). Has lost 80 lbs purposely and as a result has reduced his insulin dose. Consulted by Provider for advanced diabetes nursing assessment and care, specifically related to   [x] Inpatient management strategy    Diabetes-related medical history-deferred at this time    Diabetes medication history  Drug class Currently in use Discontinued Never used   Biguanide Metformin 1000mg twice daily     DDP-4 inhibitor       Sulfonylurea      Thiazolidinedione      GLP-1 RA Ozempic 1mg QW     SGLT-2 inhibitors Jardiance 25mg D     Basal insulin Toujeo insulin 30 units D     Fixed Dose  Combinations      Bolus insulin        Subjective   I guess you heard about my food challenges today.     Patient reports the following home diabetes self-care practices:  Eating pattern  [x] Breakfast Coffee with [employer] coffee creamers  [x] Lunch  Maldives meal (burrito)  [x] Dinner  [employer] with Novavax AB with coffee  Physical activity pattern  [x] Aerobic exercise Goes to gym 3XW  Monitoring pattern-Uses DexCom CGM to monitor BG  Taking medications pattern  [x] Consistent administration  [x] Affordable    Objective   Physical exam  General Alert, oriented and in no acute distress. Conversant and cooperative. Spoke with patient over the phone  Vital Signs Afebrile. HRR. Normotensive. Visit Vitals  /88 (BP 1 Location: Left arm, BP Patient Position: Sitting)   Pulse 88   Temp 97.7 °F (36.5 °C)   Resp 16   Ht 6' 1\" (1.854 m)   Wt 104.1 kg (229 lb 8 oz)   SpO2 91%   BMI 30.28 kg/m²     Laboratory  Lab Results   Component Value Date/Time    Hemoglobin A1c 6.8 (H) 09/12/2020 09:59 PM    Hemoglobin A1c (POC) 12.3 01/14/2014 10:18 AM     No results found for: LDL, LDLC, DLDLP  Lab Results   Component Value Date/Time    Creatinine 0.85 09/16/2020 06:53 AM     Lab Results   Component Value Date/Time    Sodium 137 09/16/2020 06:53 AM    Potassium 4.6 09/16/2020 06:53 AM    Chloride 106 09/16/2020 06:53 AM    CO2 25 09/16/2020 06:53 AM    Anion gap 6 09/16/2020 06:53 AM    Glucose 168 (H) 09/16/2020 06:53 AM    BUN 23 (H) 09/16/2020 06:53 AM    Creatinine 0.85 09/16/2020 06:53 AM    BUN/Creatinine ratio 27 (H) 09/16/2020 06:53 AM    GFR est AA >60 09/16/2020 06:53 AM    GFR est non-AA >60 09/16/2020 06:53 AM    Calcium 9.4 09/16/2020 06:53 AM    Bilirubin, total 0.8 09/16/2020 06:53 AM    Alk.  phosphatase 66 09/16/2020 06:53 AM    Protein, total 7.7 09/16/2020 06:53 AM    Albumin 3.1 (L) 09/16/2020 06:53 AM    Globulin 4.6 (H) 09/16/2020 06:53 AM    A-G Ratio 0.7 (L) 09/16/2020 06:53 AM    ALT (SGPT) 34 09/16/2020 06:53 AM     Lab Results   Component Value Date/Time    ALT (SGPT) 34 09/16/2020 06:53 AM       Factors affecting BG pattern  Factor Dose Comments   Nutrition:  Carb-controlled meals   60 grams/meal   Nothing documented   Drugs:  Steroids    Dexamethasone 6mg D X10 doses   First dose 9/12/20   Infection: COVID + Steroid  Vitamin C  Zinc  Remdisivir (to start)    Blood glucose pattern        Assessment and Plan   Nursing Diagnosis Risk for unstable blood glucose pattern   Nursing Intervention Domain 4035 Decision-making Support   Nursing Interventions Examined current inpatient diabetes control   Explored factors facilitating and impeding inpatient management     Evaluation   This gentleman, with Type 2 diabetes, has achieved inpatient blood glucose target of 100-180mg/dl. Inpatient blood glucose management has been impacted by  [x] Glucocorticoid use  He has required correction at each meal. Would increase mealtime insulin to 10 units daily, and basal to 40 units D. Recommendations   Recommend:    Increase Humalog insulin to 10 units at each meal    Increase Lantus insulin to 40 units D    [x] Will require insulin adjustments as glucocorticoids are in use     Billing Code(s)   I personally reviewed chart, notes, data and current medications in the medical record, and examined the patient at bedside before making care recommendations.      [x] [id number] IP subsequent hospital care - 30 minutes      LEXI Pressley  Program for Diabetes Health  Access via [employer]

## 2020-09-16 NOTE — PROGRESS NOTES
Name: [id number] [facility] [address]: [address]   : 1970 Admit Date: 2020   Phone: [phone]  Room: [address] [place] [address]   PCP: Jasmina Montano NP  MRN: [id number]   Date: 2020  Code: Full Code        HPI:    No acute events overnight  No acute distress  No acute complaints  Weaning O2      Current Facility-Administered Medications   Medication Dose Route Frequency    benzonatate (TESSALON) capsule 200 mg  200 mg Oral TID PRN    guaiFENesin ER (MUCINEX) tablet 600 mg  600 mg Oral Q12H    insulin lispro (HUMALOG) injection 8 Units  8 Units SubCUTAneous TIDAC    remdesivir 100 mg in 0.9% sodium chloride 250 mL IVPB  100 mg IntraVENous Q24H    azithromycin (ZITHROMAX) 500 mg in 0.9% sodium chloride (MBP/ADV) 250 mL  500 mg IntraVENous Q24H    acetaminophen (TYLENOL) tablet 650 mg  650 mg Oral Q6H PRN    ondansetron (ZOFRAN) injection 4 mg  4 mg IntraVENous Q4H PRN    atorvastatin (LIPITOR) tablet 40 mg  40 mg Oral DAILY    famotidine (PEPCID) tablet 20 mg  20 mg Oral BID    insulin glargine (LANTUS) injection 32 Units  32 Units SubCUTAneous QHS    sodium chloride (NS) flush 5-40 mL  5-40 mL IntraVENous Q8H    sodium chloride (NS) flush 5-40 mL  5-40 mL IntraVENous PRN    acetaminophen (TYLENOL) tablet 650 mg  650 mg Oral Q6H PRN    Or    acetaminophen (TYLENOL) suppository 650 mg  650 mg Rectal Q6H PRN    polyethylene glycol (MIRALAX) packet 17 g  17 g Oral DAILY PRN    promethazine (PHENERGAN) tablet 12.5 mg  12.5 mg Oral Q6H PRN    Or    ondansetron (ZOFRAN) injection 4 mg  4 mg IntraVENous Q6H PRN    ascorbic acid (vitamin C) (VITAMIN C) tablet 1,000 mg  1,000 mg Oral DAILY    zinc sulfate (ZINCATE) 220 (50) mg capsule 220 mg  220 mg Oral DAILY    insulin lispro (HUMALOG) injection   SubCUTAneous AC&HS    glucose chewable tablet 16 g  4 Tab Oral PRN    dextrose (D50W) injection syrg 12.5-25 g  12.5-25 g IntraVENous PRN    glucagon (GLUCAGEN) injection 1 mg  1 mg IntraMUSCular PRN    enoxaparin (LOVENOX) injection 30 mg  30 mg SubCUTAneous Q12H    dexAMETHasone (DECADRON) tablet 6 mg  6 mg Oral DAILY         REVIEW OF SYSTEMS   Negative except as stated in the HPI. Physical Exam:   Visit Vitals  /88 (BP 1 Location: Left arm, BP Patient Position: At rest)   Pulse 95   Temp 98.3 °F (36.8 °C)   Resp 18   Ht 6' 1\" (1.854 m)   Wt 104.1 kg (229 lb 8 oz)   SpO2 93%   BMI 30.28 kg/m²     Patient is COVID-19 positive and examination was deferred. General:  Alert, cooperative, no distress, appears stated age. Head:  atraumatic. Skin: Skin color, texture, turgor normal. No rashes or lesions       Neurologic: Grossly nonfocal       Lab Results   Component Value Date/Time    Sodium 137 09/16/2020 06:53 AM    Potassium 4.6 09/16/2020 06:53 AM    Chloride 106 09/16/2020 06:53 AM    CO2 25 09/16/2020 06:53 AM    BUN 23 (H) 09/16/2020 06:53 AM    Creatinine 0.85 09/16/2020 06:53 AM    Glucose 168 (H) 09/16/2020 06:53 AM    Calcium 9.4 09/16/2020 06:53 AM    Magnesium 2.6 (H) 09/13/2020 05:26 AM       Lab Results   Component Value Date/Time    WBC 9.3 09/16/2020 06:53 AM    HGB 14.9 09/16/2020 06:53 AM    PLATELET 965 47/04/7338 06:53 AM    MCV 87.5 09/16/2020 06:53 AM       Lab Results   Component Value Date/Time    INR 1.0 09/13/2020 05:26 AM    aPTT 29.3 09/12/2020 09:59 PM    Alk. phosphatase 66 09/16/2020 06:53 AM    Protein, total 7.7 09/16/2020 06:53 AM    Albumin 3.1 (L) 09/16/2020 06:53 AM    Globulin 4.6 (H) 09/16/2020 06:53 AM       Lab Results   Component Value Date/Time    Ferritin 1,511 (H) 09/13/2020 05:26 AM       Lab Results   Component Value Date/Time    Troponin-I, Qt. <0.05 09/12/2020 09:59 PM        IMPRESSION:  ===========  -COVID-19 positive. -COVID-19 pneumonia/ pneumonitis. -Acute hypoxemic respiratory failure  -DM.     PLAN:  =====  -wean O2 supplementation.  -vitamin c and zinc.  -on zithromac.  -decadron IV. -on Remdesevir   -BS control   -dvt ppx regular dose.       Oksana Torres MD

## 2020-09-16 NOTE — PROGRESS NOTES
I reviewed pertinent labs and imaging, and discussed /agreed on the plan of care with Dr. Ammie Leventhal. Hospitalist Progress Note    NAME: Saw Scott   :  1970   MRN:  [id number]       Assessment / Plan:  Acute respiratory failure due to COVID19 PNA  CXR showed Patchy bilateral airspace disease likely represents pneumonia  Cont' azithromycin, off rocephin  Cont' remdesivir and decadrone  Cont' mucinex BID, tesalon perls prn  Albuterol prn  Should be ok to discharge after last dose of remdesivir. He is currently stable on RA    T2DM  hgb A1c  6.8  lantus 32 units qhs, Lispro premeals, titrate prn  SSI    GERD Cont PPI          / Body mass index is 30.28 kg/m². Code status: Full  Prophylaxis: Lovenox  Recommended Disposition: Home w/Family     Subjective:     Chief Complaint / Reason for Physician Visit  Pt seen and examined, feels better today. Denies any new complaint. Review of Systems:  Symptom Y/N Comments  Symptom Y/N Comments   Fever/Chills n   Chest Pain n    Poor Appetite    Edema     Cough y   Abdominal Pain     Sputum y   Joint Pain     SOB/STEWARD n   Pruritis/Rash     Nausea/vomit n   Tolerating PT/OT     Diarrhea n   Tolerating Diet y    Constipation    Other       Could NOT obtain due to:      Objective:     VITALS:   Last 24hrs VS reviewed since prior progress note. Most recent are:  Patient Vitals for the past 24 hrs:   Temp Pulse Resp BP SpO2   20 0849 -- -- -- -- 94 %   20 0837 -- -- -- -- 93 %   20 0641 98.3 °F (36.8 °C) 95 18 111/88 93 %   09/15/20 2347 98 °F (36.7 °C) 95 18 129/87 92 %   09/15/20 1947 98.2 °F (36.8 °C) 88 18 133/84 92 %   09/15/20 1755 98.3 °F (36.8 °C) 82 19 (!) 122/91 97 %   09/15/20 1127 98.7 °F (37.1 °C) 92 (!) 109 115/87 92 %       Intake/Output Summary (Last 24 hours) at 2020 0955  Last data filed at 2020 0641  Gross per 24 hour   Intake 750 ml   Output --   Net 750 ml        PHYSICAL EXAM:  General: WD, WN.  Alert, cooperative, no acute distress    EENT:  EOMI. Anicteric sclerae. MMM  Resp:  No wheezing  No accessory muscle use. CTA b/l  CV:  Regular rhythm,  No edema  GI:  Soft, Non distended, Non tender.  +Bowel sounds  Neurologic:  Alert and oriented X 3, normal speech  Psych:   Good insight. Not anxious nor agitated  Skin:  No rashes. No jaundice    Reviewed most current lab test results and cultures  YES  Reviewed most current radiology test results   YES  Review and summation of old records today    NO  Reviewed patient's current orders and MAR    YES  PMH/SH reviewed - no change compared to H&P  ________________________________________________________________________  Care Plan discussed with:    Comments   Patient x    Family      RN x    Care Manager     Consultant                        Multidiciplinary team rounds were held today with , nursing, pharmacist and clinical coordinator. Patient's plan of care was discussed; medications were reviewed and discharge planning was addressed. ________________________________________________________________________  Total NON critical care TIME:  30   Minutes    Total CRITICAL CARE TIME Spent:   Minutes non procedure based      Comments   >50% of visit spent in counseling and coordination of care     ________________________________________________________________________  Alba Crowder MD     Procedures: see electronic medical records for all procedures/Xrays and details which were not copied into this note but were reviewed prior to creation of Plan. LABS:  I reviewed today's most current labs and imaging studies.   Pertinent labs include:  Recent Labs     09/16/20  0653 09/15/20  0450 09/14/20  0509   WBC 9.3 8.2 6.7   HGB 14.9 14.1 14.1   HCT 45.6 44.1 44.1    347 305     Recent Labs     09/16/20  0653 09/15/20  0450 09/14/20  1705    137 136   K 4.6 4.3 4.0    106 104   CO2 25 26 26   * 157* 145*   BUN 23* 23* 22*   CREA 0.85 0.79 0.77   CA 9.4 8.8 8.9   ALB 3.1* 3.0* 2.9*   TBILI 0.8 0.6 0.5   ALT 34 40 34       Signed: Bree Rivero MD

## 2020-09-17 LAB
ALBUMIN SERPL-MCNC: 3.2 G/DL (ref 3.5–5)
ALBUMIN/GLOB SERPL: 0.8 {RATIO} (ref 1.1–2.2)
ALP SERPL-CCNC: 73 U/L (ref 45–117)
ALT SERPL-CCNC: 38 U/L (ref 12–78)
ANION GAP SERPL CALC-SCNC: 5 MMOL/L (ref 5–15)
AST SERPL-CCNC: 22 U/L (ref 15–37)
BILIRUB SERPL-MCNC: 0.9 MG/DL (ref 0.2–1)
BUN SERPL-MCNC: 20 MG/DL (ref 6–20)
BUN/CREAT SERPL: 25 (ref 12–20)
CALCIUM SERPL-MCNC: 9.5 MG/DL (ref 8.5–10.1)
CHLORIDE SERPL-SCNC: 103 MMOL/L (ref 97–108)
CO2 SERPL-SCNC: 28 MMOL/L (ref 21–32)
CREAT SERPL-MCNC: 0.79 MG/DL (ref 0.7–1.3)
D DIMER PPP FEU-MCNC: <0.19 MG/L FEU (ref 0–0.65)
ERYTHROCYTE [DISTWIDTH] IN BLOOD BY AUTOMATED COUNT: 12.7 % (ref 11.5–14.5)
FIBRINOGEN PPP-MCNC: 571 MG/DL (ref 200–475)
GLOBULIN SER CALC-MCNC: 3.9 G/DL (ref 2–4)
GLUCOSE BLD STRIP.AUTO-MCNC: 195 MG/DL (ref 65–100)
GLUCOSE BLD STRIP.AUTO-MCNC: 210 MG/DL (ref 65–100)
GLUCOSE BLD STRIP.AUTO-MCNC: 287 MG/DL (ref 65–100)
GLUCOSE SERPL-MCNC: 196 MG/DL (ref 65–100)
HCT VFR BLD AUTO: 46.7 % (ref 36.6–50.3)
HGB BLD-MCNC: 15.3 G/DL (ref 12.1–17)
LDH SERPL L TO P-CCNC: 286 U/L (ref 85–241)
MCH RBC QN AUTO: 28.5 PG (ref 26–34)
MCHC RBC AUTO-ENTMCNC: 32.8 G/DL (ref 30–36.5)
MCV RBC AUTO: 87.1 FL (ref 80–99)
NRBC # BLD: 0 K/UL (ref 0–0.01)
NRBC BLD-RTO: 0 PER 100 WBC
PLATELET # BLD AUTO: 355 K/UL (ref 150–400)
PMV BLD AUTO: 9.7 FL (ref 8.9–12.9)
POTASSIUM SERPL-SCNC: 4.5 MMOL/L (ref 3.5–5.1)
PROT SERPL-MCNC: 7.1 G/DL (ref 6.4–8.2)
RBC # BLD AUTO: 5.36 M/UL (ref 4.1–5.7)
SERVICE CMNT-IMP: ABNORMAL
SODIUM SERPL-SCNC: 136 MMOL/L (ref 136–145)
WBC # BLD AUTO: 9.6 K/UL (ref 4.1–11.1)

## 2020-09-17 PROCEDURE — 85027 COMPLETE CBC AUTOMATED: CPT

## 2020-09-17 PROCEDURE — 74011636637 HC RX REV CODE- 636/637: Performed by: HOSPITALIST

## 2020-09-17 PROCEDURE — 85379 FIBRIN DEGRADATION QUANT: CPT

## 2020-09-17 PROCEDURE — 83615 LACTATE (LD) (LDH) ENZYME: CPT

## 2020-09-17 PROCEDURE — 74011250637 HC RX REV CODE- 250/637: Performed by: HOSPITALIST

## 2020-09-17 PROCEDURE — 80053 COMPREHEN METABOLIC PANEL: CPT

## 2020-09-17 PROCEDURE — 74011636637 HC RX REV CODE- 636/637: Performed by: INTERNAL MEDICINE

## 2020-09-17 PROCEDURE — 74011000258 HC RX REV CODE- 258: Performed by: INTERNAL MEDICINE

## 2020-09-17 PROCEDURE — 36415 COLL VENOUS BLD VENIPUNCTURE: CPT

## 2020-09-17 PROCEDURE — 74011250637 HC RX REV CODE- 250/637: Performed by: INTERNAL MEDICINE

## 2020-09-17 PROCEDURE — 85384 FIBRINOGEN ACTIVITY: CPT

## 2020-09-17 PROCEDURE — 65660000000 HC RM CCU STEPDOWN

## 2020-09-17 PROCEDURE — 74011250636 HC RX REV CODE- 250/636: Performed by: HOSPITALIST

## 2020-09-17 PROCEDURE — 94760 N-INVAS EAR/PLS OXIMETRY 1: CPT

## 2020-09-17 PROCEDURE — 74011636637 HC RX REV CODE- 636/637: Performed by: CLINICAL NURSE SPECIALIST

## 2020-09-17 PROCEDURE — 82962 GLUCOSE BLOOD TEST: CPT

## 2020-09-17 PROCEDURE — 77010033678 HC OXYGEN DAILY

## 2020-09-17 RX ORDER — INSULIN GLARGINE 100 [IU]/ML
40 INJECTION, SOLUTION SUBCUTANEOUS
Status: DISCONTINUED | OUTPATIENT
Start: 2020-09-17 | End: 2020-09-18 | Stop reason: HOSPADM

## 2020-09-17 RX ADMIN — INSULIN LISPRO 5 UNITS: 100 INJECTION, SOLUTION INTRAVENOUS; SUBCUTANEOUS at 11:30

## 2020-09-17 RX ADMIN — Medication 10 ML: at 05:06

## 2020-09-17 RX ADMIN — Medication 10 ML: at 20:57

## 2020-09-17 RX ADMIN — ATORVASTATIN CALCIUM 40 MG: 40 TABLET, FILM COATED ORAL at 08:20

## 2020-09-17 RX ADMIN — INSULIN LISPRO 10 UNITS: 100 INJECTION, SOLUTION INTRAVENOUS; SUBCUTANEOUS at 08:20

## 2020-09-17 RX ADMIN — INSULIN GLARGINE 40 UNITS: 100 INJECTION, SOLUTION SUBCUTANEOUS at 21:02

## 2020-09-17 RX ADMIN — INSULIN LISPRO 2 UNITS: 100 INJECTION, SOLUTION INTRAVENOUS; SUBCUTANEOUS at 08:20

## 2020-09-17 RX ADMIN — FAMOTIDINE 20 MG: 20 TABLET, FILM COATED ORAL at 08:19

## 2020-09-17 RX ADMIN — Medication 10 ML: at 16:08

## 2020-09-17 RX ADMIN — ZINC SULFATE 220 MG (50 MG) CAPSULE 220 MG: CAPSULE at 08:19

## 2020-09-17 RX ADMIN — OXYCODONE HYDROCHLORIDE AND ACETAMINOPHEN 1000 MG: 500 TABLET ORAL at 08:19

## 2020-09-17 RX ADMIN — INSULIN LISPRO 10 UNITS: 100 INJECTION, SOLUTION INTRAVENOUS; SUBCUTANEOUS at 17:10

## 2020-09-17 RX ADMIN — ENOXAPARIN SODIUM 30 MG: 30 INJECTION SUBCUTANEOUS at 22:28

## 2020-09-17 RX ADMIN — GUAIFENESIN 600 MG: 600 TABLET, EXTENDED RELEASE ORAL at 20:56

## 2020-09-17 RX ADMIN — INSULIN LISPRO 2 UNITS: 100 INJECTION, SOLUTION INTRAVENOUS; SUBCUTANEOUS at 17:10

## 2020-09-17 RX ADMIN — FAMOTIDINE 20 MG: 20 TABLET, FILM COATED ORAL at 17:10

## 2020-09-17 RX ADMIN — REMDESIVIR 100 MG: 100 INJECTION, POWDER, LYOPHILIZED, FOR SOLUTION INTRAVENOUS at 16:08

## 2020-09-17 RX ADMIN — INSULIN LISPRO 2 UNITS: 100 INJECTION, SOLUTION INTRAVENOUS; SUBCUTANEOUS at 21:03

## 2020-09-17 RX ADMIN — AZITHROMYCIN 500 MG: 500 INJECTION, POWDER, LYOPHILIZED, FOR SOLUTION INTRAVENOUS at 20:54

## 2020-09-17 RX ADMIN — ENOXAPARIN SODIUM 30 MG: 30 INJECTION SUBCUTANEOUS at 11:29

## 2020-09-17 RX ADMIN — DEXAMETHASONE 6 MG: 4 TABLET ORAL at 21:11

## 2020-09-17 RX ADMIN — GUAIFENESIN 600 MG: 600 TABLET, EXTENDED RELEASE ORAL at 08:20

## 2020-09-17 RX ADMIN — INSULIN LISPRO 10 UNITS: 100 INJECTION, SOLUTION INTRAVENOUS; SUBCUTANEOUS at 11:30

## 2020-09-17 NOTE — PROGRESS NOTES
Last night, patient requested not to be disturbed during his sleep and requested to do his labs and vitals at 05:am instead of earlier. Patient was irritated during the beginning of the shift with food services and number of times being disturbed at night. This nurse went to the cafeteria and got him fruits and bottles of water. Patient was calm and cooperative later.

## 2020-09-17 NOTE — PROGRESS NOTES
I reviewed pertinent labs and imaging, and discussed /agreed on the plan of care with Dr. Felice Cannon. Hospitalist Progress Note    NAME: Los Officer   :  1970   MRN:  [id number]       Assessment / Plan:  Acute respiratory failure due to COVID19 PNA  CXR showed Patchy bilateral airspace disease likely represents pneumonia  Cont' azithromycin, off rocephin  Cont' remdesivir and decadrone  Cont' mucinex BID, tesalon perls prn  Albuterol prn  Cont' remdesivir  He is currently stable on RA    T2DM  hgb A1c  6.8  lantus 32 units qhs, Lispro premeals, titrate prn  SSI    GERD Cont PPI          / Body mass index is 30.28 kg/m². Code status: Full  Prophylaxis: Lovenox  Recommended Disposition: Home w/Family     Subjective:     Chief Complaint / Reason for Physician Visit: covid 19 pna    no new complaint. Review of Systems:  Symptom Y/N Comments  Symptom Y/N Comments   Fever/Chills n   Chest Pain n    Poor Appetite    Edema     Cough y   Abdominal Pain     Sputum y   Joint Pain     SOB/STEWARD n   Pruritis/Rash     Nausea/vomit n   Tolerating PT/OT     Diarrhea n   Tolerating Diet y    Constipation    Other       Could NOT obtain due to:      Objective:     VITALS:   Last 24hrs VS reviewed since prior progress note. Most recent are:  Patient Vitals for the past 24 hrs:   Temp Pulse Resp BP SpO2   20 1128 98.2 °F (36.8 °C) 100 18 110/89 92 %   20 0507 98.3 °F (36.8 °C) 95 17 115/86 93 %   20 2215 97.7 °F (36.5 °C) (!) 102 16 130/87 93 %   20 1947 98.2 °F (36.8 °C) 91 18 120/89 91 %   20 1541 98.3 °F (36.8 °C) 80 16 122/84 94 %     No intake or output data in the 24 hours ending 20 1322     PHYSICAL EXAM:  General: WD, WN. Alert, cooperative, no acute distress    EENT:  EOMI. Anicteric sclerae. MMM  Resp:  No wheezing  No accessory muscle use.   CTA b/l  CV:  Regular rhythm,  No edema  GI:  Soft, Non distended, Non tender.  +Bowel sounds  Neurologic:  Alert and oriented X 3, normal speech  Psych:   Good insight. Not anxious nor agitated  Skin:  No rashes. No jaundice    Reviewed most current lab test results and cultures  YES  Reviewed most current radiology test results   YES  Review and summation of old records today    NO  Reviewed patient's current orders and MAR    YES  PMH/SH reviewed - no change compared to H&P  ________________________________________________________________________  Care Plan discussed with:    Comments   Patient x    Family      RN x    Care Manager     Consultant                        Multidiciplinary team rounds were held today with , nursing, pharmacist and clinical coordinator. Patient's plan of care was discussed; medications were reviewed and discharge planning was addressed. ________________________________________________________________________  Total NON critical care TIME:  30   Minutes    Total CRITICAL CARE TIME Spent:   Minutes non procedure based      Comments   >50% of visit spent in counseling and coordination of care     ________________________________________________________________________  Joshua Egan MD     Procedures: see electronic medical records for all procedures/Xrays and details which were not copied into this note but were reviewed prior to creation of Plan. LABS:  I reviewed today's most current labs and imaging studies.   Pertinent labs include:  Recent Labs     09/17/20 0520 09/16/20  0653 09/15/20  0450   WBC 9.6 9.3 8.2   HGB 15.3 14.9 14.1   HCT 46.7 45.6 44.1    381 347     Recent Labs     09/17/20 0520 09/16/20  0653 09/15/20  0450    137 137   K 4.5 4.6 4.3    106 106   CO2 28 25 26   * 168* 157*   BUN 20 23* 23*   CREA 0.79 0.85 0.79   CA 9.5 9.4 8.8   ALB 3.2* 3.1* 3.0*   TBILI 0.9 0.8 0.6   ALT 38 34 40       Signed: Joshua Egan MD

## 2020-09-17 NOTE — PROGRESS NOTES
Name: [id number] [facility] [address]: [address]   : 1970 Admit Date: 2020   Phone: [phone]  Room: [address] [place] [address]   PCP: Eric Mayorga NP  MRN: [id number]   Date: 2020  Code: Full Code        HPI:    No acute events overnight  No acute distress  No acute complaints  Weaned off O2      Current Facility-Administered Medications   Medication Dose Route Frequency    insulin lispro (HUMALOG) injection 10 Units  10 Units SubCUTAneous TIDAC    benzonatate (TESSALON) capsule 200 mg  200 mg Oral TID PRN    guaiFENesin ER (MUCINEX) tablet 600 mg  600 mg Oral Q12H    remdesivir 100 mg in 0.9% sodium chloride 250 mL IVPB  100 mg IntraVENous Q24H    azithromycin (ZITHROMAX) 500 mg in 0.9% sodium chloride (MBP/ADV) 250 mL  500 mg IntraVENous Q24H    acetaminophen (TYLENOL) tablet 650 mg  650 mg Oral Q6H PRN    atorvastatin (LIPITOR) tablet 40 mg  40 mg Oral DAILY    famotidine (PEPCID) tablet 20 mg  20 mg Oral BID    insulin glargine (LANTUS) injection 32 Units  32 Units SubCUTAneous QHS    sodium chloride (NS) flush 5-40 mL  5-40 mL IntraVENous Q8H    sodium chloride (NS) flush 5-40 mL  5-40 mL IntraVENous PRN    acetaminophen (TYLENOL) tablet 650 mg  650 mg Oral Q6H PRN    Or    acetaminophen (TYLENOL) suppository 650 mg  650 mg Rectal Q6H PRN    polyethylene glycol (MIRALAX) packet 17 g  17 g Oral DAILY PRN    promethazine (PHENERGAN) tablet 12.5 mg  12.5 mg Oral Q6H PRN    Or    ondansetron (ZOFRAN) injection 4 mg  4 mg IntraVENous Q6H PRN    ascorbic acid (vitamin C) (VITAMIN C) tablet 1,000 mg  1,000 mg Oral DAILY    zinc sulfate (ZINCATE) 220 (50) mg capsule 220 mg  220 mg Oral DAILY    insulin lispro (HUMALOG) injection   SubCUTAneous AC&HS    glucose chewable tablet 16 g  4 Tab Oral PRN    dextrose (D50W) injection syrg 12.5-25 g  12.5-25 g IntraVENous PRN    glucagon (GLUCAGEN) injection 1 mg  1 mg IntraMUSCular PRN    enoxaparin (LOVENOX) injection 30 mg  30 mg SubCUTAneous Q12H    dexAMETHasone (DECADRON) tablet 6 mg  6 mg Oral DAILY         REVIEW OF SYSTEMS   Negative except as stated in the HPI. Physical Exam:   Visit Vitals  /86 (BP 1 Location: Left arm, BP Patient Position: At rest)   Pulse 95   Temp 98.3 °F (36.8 °C)   Resp 17   Ht 6' 1\" (1.854 m)   Wt 104.1 kg (229 lb 8 oz)   SpO2 93%   BMI 30.28 kg/m²     Patient is COVID-19 positive and examination was deferred. General:  Alert, cooperative, no distress, appears stated age. Head:  atraumatic. Skin: Skin color, texture, turgor normal. No rashes or lesions       Neurologic: Grossly nonfocal       Lab Results   Component Value Date/Time    Sodium 136 09/17/2020 05:20 AM    Potassium 4.5 09/17/2020 05:20 AM    Chloride 103 09/17/2020 05:20 AM    CO2 28 09/17/2020 05:20 AM    BUN 20 09/17/2020 05:20 AM    Creatinine 0.79 09/17/2020 05:20 AM    Glucose 196 (H) 09/17/2020 05:20 AM    Calcium 9.5 09/17/2020 05:20 AM    Magnesium 2.6 (H) 09/13/2020 05:26 AM       Lab Results   Component Value Date/Time    WBC 9.6 09/17/2020 05:20 AM    HGB 15.3 09/17/2020 05:20 AM    PLATELET 096 60/75/7809 05:20 AM    MCV 87.1 09/17/2020 05:20 AM       Lab Results   Component Value Date/Time    INR 1.0 09/13/2020 05:26 AM    aPTT 29.3 09/12/2020 09:59 PM    Alk. phosphatase 73 09/17/2020 05:20 AM    Protein, total 7.1 09/17/2020 05:20 AM    Albumin 3.2 (L) 09/17/2020 05:20 AM    Globulin 3.9 09/17/2020 05:20 AM       Lab Results   Component Value Date/Time    Ferritin 1,511 (H) 09/13/2020 05:26 AM       Lab Results   Component Value Date/Time    Troponin-I, Qt. <0.05 09/12/2020 09:59 PM        IMPRESSION:  ===========  -COVID-19 positive. -COVID-19 pneumonia/ pneumonitis. -Acute hypoxemic respiratory failure  -DM. PLAN:  =====  -weaned off O2 supplementation.  -vitamin c and zinc.  -on zithromac.  -decadron IV.   -on Remdesevir, last dose friday   -BS control   -dvt ppx regular dose.  -home Friday  -will follow with me as out pt      El Anthony MD

## 2020-09-17 NOTE — PROGRESS NOTES
Transition of Care   -Home   -PCP follow-up     Per chart review, Pt is currently stable on room air. No anticipated discharge needs at this time. CM will continue to follow for discharge planning needs as necessary.          Christella Barthel, [facility], [address] [place] [address]

## 2020-09-17 NOTE — PROGRESS NOTES
Bedside and Verbal shift change report given to Adelina Kaiser RN (oncoming nurse) by Omar [address] (offgoing nurse). Report included the following information SBAR, Kardex, Procedure Summary, Intake/Output, MAR, Accordion and Recent Results.

## 2020-09-18 VITALS
TEMPERATURE: 98.2 F | HEART RATE: 87 BPM | SYSTOLIC BLOOD PRESSURE: 129 MMHG | DIASTOLIC BLOOD PRESSURE: 76 MMHG | RESPIRATION RATE: 16 BRPM | OXYGEN SATURATION: 94 % | WEIGHT: 229.5 LBS | BODY MASS INDEX: 30.42 KG/M2 | HEIGHT: 73 IN

## 2020-09-18 LAB
D DIMER PPP FEU-MCNC: 0.19 MG/L FEU (ref 0–0.65)
ERYTHROCYTE [DISTWIDTH] IN BLOOD BY AUTOMATED COUNT: 12.8 % (ref 11.5–14.5)
FIBRINOGEN PPP-MCNC: 553 MG/DL (ref 200–475)
GLUCOSE BLD STRIP.AUTO-MCNC: 234 MG/DL (ref 65–100)
GLUCOSE BLD STRIP.AUTO-MCNC: 245 MG/DL (ref 65–100)
HCT VFR BLD AUTO: 46.2 % (ref 36.6–50.3)
HGB BLD-MCNC: 14.7 G/DL (ref 12.1–17)
LDH SERPL L TO P-CCNC: 231 U/L (ref 85–241)
MCH RBC QN AUTO: 28 PG (ref 26–34)
MCHC RBC AUTO-ENTMCNC: 31.8 G/DL (ref 30–36.5)
MCV RBC AUTO: 88 FL (ref 80–99)
NRBC # BLD: 0 K/UL (ref 0–0.01)
NRBC BLD-RTO: 0 PER 100 WBC
PLATELET # BLD AUTO: 364 K/UL (ref 150–400)
PMV BLD AUTO: 9.9 FL (ref 8.9–12.9)
RBC # BLD AUTO: 5.25 M/UL (ref 4.1–5.7)
SERVICE CMNT-IMP: ABNORMAL
SERVICE CMNT-IMP: ABNORMAL
WBC # BLD AUTO: 9.5 K/UL (ref 4.1–11.1)

## 2020-09-18 PROCEDURE — 74011000258 HC RX REV CODE- 258: Performed by: INTERNAL MEDICINE

## 2020-09-18 PROCEDURE — 85027 COMPLETE CBC AUTOMATED: CPT

## 2020-09-18 PROCEDURE — 85384 FIBRINOGEN ACTIVITY: CPT

## 2020-09-18 PROCEDURE — 74011250637 HC RX REV CODE- 250/637: Performed by: INTERNAL MEDICINE

## 2020-09-18 PROCEDURE — 85379 FIBRIN DEGRADATION QUANT: CPT

## 2020-09-18 PROCEDURE — 74011636637 HC RX REV CODE- 636/637: Performed by: INTERNAL MEDICINE

## 2020-09-18 PROCEDURE — 82962 GLUCOSE BLOOD TEST: CPT

## 2020-09-18 PROCEDURE — 74011636637 HC RX REV CODE- 636/637: Performed by: HOSPITALIST

## 2020-09-18 PROCEDURE — 74011000250 HC RX REV CODE- 250: Performed by: INTERNAL MEDICINE

## 2020-09-18 PROCEDURE — 36415 COLL VENOUS BLD VENIPUNCTURE: CPT

## 2020-09-18 PROCEDURE — 94760 N-INVAS EAR/PLS OXIMETRY 1: CPT

## 2020-09-18 PROCEDURE — 83615 LACTATE (LD) (LDH) ENZYME: CPT

## 2020-09-18 PROCEDURE — 74011250637 HC RX REV CODE- 250/637: Performed by: HOSPITALIST

## 2020-09-18 RX ORDER — DEXAMETHASONE 6 MG/1
6 TABLET ORAL
Qty: 5 TAB | Refills: 0 | Status: SHIPPED | OUTPATIENT
Start: 2020-09-18 | End: 2022-10-17 | Stop reason: ALTCHOICE

## 2020-09-18 RX ADMIN — INSULIN LISPRO 10 UNITS: 100 INJECTION, SOLUTION INTRAVENOUS; SUBCUTANEOUS at 08:59

## 2020-09-18 RX ADMIN — GUAIFENESIN 600 MG: 600 TABLET, EXTENDED RELEASE ORAL at 09:00

## 2020-09-18 RX ADMIN — REMDESIVIR 100 MG: 100 INJECTION, POWDER, LYOPHILIZED, FOR SOLUTION INTRAVENOUS at 09:10

## 2020-09-18 RX ADMIN — FAMOTIDINE 20 MG: 20 TABLET, FILM COATED ORAL at 09:00

## 2020-09-18 RX ADMIN — INSULIN LISPRO 3 UNITS: 100 INJECTION, SOLUTION INTRAVENOUS; SUBCUTANEOUS at 09:00

## 2020-09-18 RX ADMIN — ATORVASTATIN CALCIUM 40 MG: 40 TABLET, FILM COATED ORAL at 09:00

## 2020-09-18 RX ADMIN — ZINC SULFATE 220 MG (50 MG) CAPSULE 220 MG: CAPSULE at 09:00

## 2020-09-18 RX ADMIN — OXYCODONE HYDROCHLORIDE AND ACETAMINOPHEN 1000 MG: 500 TABLET ORAL at 09:00

## 2020-09-18 RX ADMIN — Medication 10 ML: at 06:41

## 2020-09-18 NOTE — PROGRESS NOTES
Reviewed discharge instructions with pt including follow-up appointments, new medications and side effects, medications to continue, medications to discontinue,  Education (including s/s of heart attack and stroke), and MyChart information. PT expressed understanding. IV was removed.

## 2020-09-18 NOTE — DISCHARGE SUMMARY
Discharge Summary      Name: Brady Freeze  [id number]  YOB: 1970 (Age: 44 y.o.)   Date of Admission: 9/12/2020  Date of Discharge: 9/18/2020  Attending Physician: Cornelia Rodriguez MD    Discharge Diagnosis:   Acute respiratory failure due to COVID19 PNA  T2DM  GERD    Consultations:  IP CONSULT TO HOSPITALIST  IP CONSULT TO PULMONOLOGY  IP CONSULT TO INFECTIOUS DISEASES    Brief Admission History/Reason for Admission Per Mic Dempsey MD:   Zohaib Knox a 44 y. o. male, pmhx DM, GERD, who presents by ambulance to the ED c/o SOB 2/2 covid-19.  He notes he began having multiple symptoms 8/31/20. Cheryl Deleon received outpatient testing and was tested positive for Covid-19 on 9/5/20.  His symptoms that started on 8/31 include nausea/vomiting (2 episodes), anosmia, ageusia, HA, rhinnorhea, myalgias, fever to 104 resolved with acetaminopohen, cough.  He reports tonight, him and his girlfriend got into a verbal argument and he noticed he was SOB with prolonged speech.  He noticed the SOB getting worse throughout the night and presented to the ED for evaluation.  Of note, his girlfriend also tested positive and is in the ED for similar symptoms. Cheryl Deleon currently denies many of his original symptoms and currently only notes SOB, cough, fatigue.   PCP: Catie Smith, NP   There are no other complaints, changes, or physical findings at this time.     [address] by [address] Problems:   Acute respiratory failure due to COVID19 PNA  CXR showed Patchy bilateral airspace disease likely represents pneumonia  Pt was started on IV abx: azithromycin and rocephin which he completed inpt. Patient also completed remdesivir inpt. Decadron was started on admission which he completed 5/10 days inpt. Pt will cont' to take decadron to complete 10 day course. Pt has remained stable on RA for >48 hrs.   Discussed with pulmonary Dr Jim Blas this AM, he cleared him for discharge.     T2DM, cont' home meds.     GERD Cont PPI      Discharge Exam:  Patient seen and examined by me on discharge day. Pertinent Findings:  Visit Vitals  /76 (BP 1 Location: Left arm, BP Patient Position: At rest)   Pulse 87   Temp 98.2 °F (36.8 °C)   Resp 16   Ht 6' 1\" (1.854 m)   Wt 104.1 kg (229 lb 8 oz)   SpO2 94%   BMI 30.28 kg/m²     Gen:    Not in distress  Chest: No accessory muscle use  CVS:   Regular rhythm. Abd:  Not distended    Discharge/Recent Laboratory Results:  Recent Labs     09/17/20  0520      K 4.5      CO2 28   BUN 20   *   CA 9.5     Recent Labs     09/18/20  0514   HGB 14.7   HCT 46.2   WBC 9.5          Discharge Medications:  Current Discharge Medication List      START taking these medications    Details   dexAMETHasone (Decadron) 6 mg tablet Take 1 Tab by mouth Daily (before breakfast). Qty: 5 Tab, Refills: 0         CONTINUE these medications which have NOT CHANGED    Details   omeprazole (PRILOSEC) 40 mg capsule Take 40 mg by mouth daily. cholecalciferol (VITAMIN D3) (2,000 UNITS /50 MCG) cap capsule Take 5,000 Units by mouth daily. B-complex with vitamin C (SUPER B COMPLEX-VITAMIN C PO) Take  by mouth. semaglutide (OZEMPIC) 0.25 mg/0.2 mL (2 mg/1.5 mL) sub-q pen 1 mg by SubCUTAneous route every seven (7) days. famotidine (Pepcid) 20 mg tablet Take 20 mg by mouth two (2) times a day. naproxen sodium (NAPROSYN) 220 mg tablet Take 220 mg by mouth two (2) times daily as needed. insulin glargine U-300 conc (TOUJEO MAX U-300 SOLOSTAR) 300 unit/mL (3 mL) inpn 30 Units by SubCUTAneous route daily. JARDIANCE 25 mg tablet Take 25 mg by mouth daily. metFORMIN ER 1,000 mg tr24 Take 2,000 mg by mouth daily. atorvastatin (LIPITOR) 40 mg tablet Take 40 mg by mouth daily. phentermine (ADIPEX-P) 37.5 mg tablet Take 37.5 mg by mouth daily.                DISPOSITION:    Home with Family: x   Home with HH/PT/OT/RN:    SNF/LTC: [facility]:    OTHER:          Follow up with:   PCP : Obie Black NP  Follow-up Information     Follow up With Specialties Details Why Contact Info    Mike Smith NP Pediatric Medicine In 3 days  [address]  [address] [address]  [phone]              Total time in minutes spent coordinating this discharge (includes going over instructions, follow-up, prescriptions, and preparing report for sign off to her PCP) :  35 minutes

## 2020-09-18 NOTE — PROGRESS NOTES
Comprehensive Nutrition Assessment    Type and Reason for Visit: Initial(LOS)    Nutrition Recommendations/Plan:   · Continue consistent CHO diet  · Please document % meals and supplements consumed in flowsheet I/O's under intake     Nutrition Assessment:      LOS pt. Chart reviewed. Med noted for ARF d/t covid, PNA. PMHx: DM, GERD. Current diet: consistent CHO. Pt reports excellent appetite today and PTA. He has been working on losing wt via portion control and he has lost 75lbs over the past year. Pt takes B complex, vit C and vit C at home. Pt is allergic to nati. Diabetes management is following. No GI complaints at this time. Estimated Daily Nutrient Needs:  Energy (kcal):  2278 kcal (BMR 1945 x 1. 3AF - 250)  Protein (g):  83-104g (0.8-1.0g/kg)       Fluid (ml/day):  2200ml    Nutrition Related Findings:  Meds: vit C, zithromax, decadron, lovenox, pepcid, lantus, humalog, remdesivir, zincate. Labs: ,210,195,287. A1c 6.8. BM 9/17. Wounds:    None       Current Nutrition Therapies:   DIET DIABETIC CONSISTENT CARB Regular  DIET ONE TIME MESSAGE    Anthropometric Measures:  · Height:  6' 1\" (185.4 cm)  · Current Body Wt:  104.1 kg (229 lb 8 oz)   · BMI Category:  Obese class 1 (BMI 30.0-34. 9)       Nutrition Diagnosis:   · Altered nutrition-related lab values related to endocrine dysfunction as evidenced by lab values(,401,831)      Nutrition Interventions:   Food and/or Nutrient Delivery: Continue current diet  Nutrition Education and Counseling: No recommendations at this time  Coordination of Nutrition Care: No recommendation at this time    Goals:  Pt will consume >70% meals with BG <200 next 3-5 days       Nutrition Monitoring and Evaluation:   Behavioral-Environmental Outcomes:    Food/Nutrient Intake Outcomes: Food and nutrient intake  Physical Signs/Symptoms Outcomes: Biochemical data, Weight    Discharge Planning:    No discharge needs at this time     Electronically signed by [address] [place] [address] Alexander, [address] on 9/18/2020 at 8:48 AM    Contact: tamara [address] pager [phone]

## 2020-09-18 NOTE — PROGRESS NOTES
Bedside and Verbal shift change report given to  Sukhi Valenzuela RN (oncoming nurse) by Opal Pac (offgoing nurse). Report included the following information SBAR, Kardex, Procedure Summary, Intake/Output, MAR and Accordion.

## 2020-09-18 NOTE — PROGRESS NOTES
Problem: Risk for Spread of Infection  Goal: Prevent transmission of infectious organism to others  Description: Prevent the transmission of infectious organisms to other patients, staff members, and visitors. Outcome: Progressing Towards Goal     Problem: Patient Education:  Go to Education Activity  Goal: Patient/Family Education  Outcome: Progressing Towards Goal     Problem: Diabetes Self-Management  Goal: *Disease process and treatment process  Description: Define diabetes and identify own type of diabetes; list 3 options for treating diabetes. Outcome: Progressing Towards Goal  Goal: *Incorporating nutritional management into lifestyle  Description: Describe effect of type, amount and timing of food on blood glucose; list 3 methods for planning meals. Outcome: Progressing Towards Goal  Goal: *Incorporating physical activity into lifestyle  Description: State effect of exercise on blood glucose levels. Outcome: Progressing Towards Goal  Goal: *Developing strategies to promote health/change behavior  Description: Define the ABC's of diabetes; identify appropriate screenings, schedule and personal plan for screenings. Outcome: Progressing Towards Goal  Goal: *Using medications safely  Description: State effect of diabetes medications on diabetes; name diabetes medication taking, action and side effects. Outcome: Progressing Towards Goal  Goal: *Monitoring blood glucose, interpreting and using results  Description: Identify recommended blood glucose targets  and personal targets. Outcome: Progressing Towards Goal  Goal: *Prevention, detection, treatment of acute complications  Description: List symptoms of hyper- and hypoglycemia; describe how to treat low blood sugar and actions for lowering  high blood glucose level.   Outcome: Progressing Towards Goal  Goal: *Prevention, detection and treatment of chronic complications  Description: Define the natural course of diabetes and describe the relationship of blood glucose levels to long term complications of diabetes. Outcome: Progressing Towards Goal  Goal: *Developing strategies to address psychosocial issues  Description: Describe feelings about living with diabetes; identify support needed and support network  Outcome: Progressing Towards Goal  Goal: *Insulin pump training  Outcome: Progressing Towards Goal  Goal: *Sick day guidelines  Outcome: Progressing Towards Goal  Goal: *Patient Specific Goal (EDIT GOAL, INSERT TEXT)  Outcome: Progressing Towards Goal     Problem: Patient Education: Go to Patient Education Activity  Goal: Patient/Family Education  Outcome: Progressing Towards Goal     Problem: Falls - Risk of  Goal: *Absence of Falls  Description: Document Dinesh Fall Risk and appropriate interventions in the flowsheet.   Outcome: Progressing Towards Goal  Note: Fall Risk Interventions:            Medication Interventions: Teach patient to arise slowly                   Problem: Patient Education: Go to Patient Education Activity  Goal: Patient/Family Education  Outcome: Progressing Towards Goal

## 2020-09-19 ENCOUNTER — PATIENT OUTREACH (OUTPATIENT)
Dept: CASE MANAGEMENT | Age: 50
End: 2020-09-19

## 2020-09-19 NOTE — PROGRESS NOTES
Patient was admitted to [facility] on 20 and discharged on 20 for COVID + PNA/Pneumonitis. Patient was contacted within one business days of discharge. VM was full-not able to leave . Sent text msg asking for return call. Top Discharge Challenges to be reviewed by the provider   Additional needs identified to be addressed with provider no  none  Discussed COVID-19 related testing which was available at this time. Test results were positive. Patient informed of results, if available? NA   Method of communication with provider : none       Advance Care Planning:   Does patient have an Advance Directive:  yes; reviewed and current  and health care decision makers updated    Inpatient Readmission Risk score: NA  Was this a readmission? no   Patient stated reason for the admission: NA  Patients top risk factors for readmission: medical condition  Interventions to address risk factors: education and support, collaboration with provider    Care Transition Nurse (CTN) contacted the patient by telephone to perform post hospital discharge assessment. Verified name and  with patient as identifiers. Provided introduction to self, and explanation of the CTN role. CTN reviewed discharge instructions, medical action plan and red flags with patient who verbalized understanding. Patient given an opportunity to ask questions and does not have any further questions or concerns at this time. The patient agrees to contact the PCP office for questions related to their healthcare. Medication reconciliation was performed with patient, who verbalizes understanding of administration of home medications. Advised obtaining a 90-day supply of all daily and as-needed medications. Referral to Pharm D needed: no     Home Health/Outpatient orders at discharge: none    Durable Medical Equipment ordered at discharge: none    Covid Risk Education    Patient has following risk factors of: diabetes. Education provided regarding infection prevention, and signs and symptoms of COVID-19 and when to seek medical attention with patient who verbalized understanding. Discussed exposure protocols and quarantine From CDC: Are you at higher risk for severe illness?  and given an opportunity for questions and concerns. The patient agrees to contact the COVID-19 hotline [phone] or PCP office for questions related to COVID-19. For more information on steps you can take to protect yourself, see CDC's How to Protect Yourself     Discussed follow-up appointments. If no appointment was previously scheduled, appointment scheduling offered: not needed  [facility] follow up appointment(s):  He had VV with PCP- Demetria Ibarra NP on 9/21 at 10 am.   No future appointments. [facility] follow up appointment(s):   Pulmonary- Dr. Julian Mensah October. Plan for follow-up call in 10-14 days based on severity of symptoms and risk factors. CTN provided contact information for future needs. Goals Addressed                 This Visit's Progress       Post Hospitalization     Supportive resources in place to maintain patient in the community (ie. Home Health, DME equipment, refer to, medication assistant plan, etc.)        9/22/20-  spoke with Mr. Benjamin Munroe. When CTN reached him- he relayed frustration with the following: he has been in touch with employer- he has all documentation needed except letter to return to work. He has been trying to secure return to work note. He had VV with PCP provider yesterday- they deferred to pulmonary and when he spoke with Dr. Tami Shea- he said the attending-discharging MD at hospital would have to provide the note. He states that he has left message on phone number documented on his discharge paperwork to reach discharging MD but had not heard back.    CTN reached one of the [facility] attendings- Dr. Deondre Farrell he was willing to write letter- CTN reached in-pt Manuel ARRIETA Memos - she printed and emailed the letter to Mr. Nghia Roy. CTN updated Mr. Nghia Roy and asked him to let me know if he did not receive. [employer]        Understands red flags post discharge. 9/22/20- reached Mr. Nghia Roy. His breathing is much better- he has pulse Ox and is monitoring sats. Fatigues and rests. He is hoarse-laryngitis today. Encouraged him to rest, stay hydrated and focus on good nutrition. He was asking about Vit C and Zinc- advised that some patients were prescribed this and it would help support immune system. He should ask Endocrine or PCP. He has been monitoring blood glucose levels during current steroid RX- have been over 300 and he has been adjusting insulin according to what he and Endocrine have been discussing. He relays increased urination- he verbalized understanding of blood glucose elimination through urination with current meds he is taking for DM.     [employer]

## 2020-09-21 ENCOUNTER — VIRTUAL VISIT (OUTPATIENT)
Dept: FAMILY MEDICINE CLINIC | Age: 50
End: 2020-09-21
Payer: COMMERCIAL

## 2020-09-21 ENCOUNTER — TELEPHONE (OUTPATIENT)
Dept: FAMILY MEDICINE CLINIC | Age: 50
End: 2020-09-21

## 2020-09-21 DIAGNOSIS — Z09 HOSPITAL DISCHARGE FOLLOW-UP: Primary | ICD-10-CM

## 2020-09-21 DIAGNOSIS — E11.9 TYPE 2 DIABETES MELLITUS WITHOUT COMPLICATION, WITH LONG-TERM CURRENT USE OF INSULIN (HCC): ICD-10-CM

## 2020-09-21 DIAGNOSIS — U07.1 PNEUMONIA DUE TO COVID-19 VIRUS: ICD-10-CM

## 2020-09-21 DIAGNOSIS — J12.82 PNEUMONIA DUE TO COVID-19 VIRUS: ICD-10-CM

## 2020-09-21 DIAGNOSIS — Z79.4 TYPE 2 DIABETES MELLITUS WITHOUT COMPLICATION, WITH LONG-TERM CURRENT USE OF INSULIN (HCC): ICD-10-CM

## 2020-09-21 PROCEDURE — 99214 OFFICE O/P EST MOD 30 MIN: CPT | Performed by: NURSE PRACTITIONER

## 2020-09-21 NOTE — TELEPHONE ENCOUNTER
Pt is calling needing a note to go back to work states his job wont let him return without this he states he needs to speak with you before it is written

## 2020-09-21 NOTE — TELEPHONE ENCOUNTER
Called pt and informed him that pulmonary could write him a note this week and he stated that he does not see pulmonary until October 8th and \"this is why I asked her to call me before she wrote any notes\".

## 2020-09-21 NOTE — PROGRESS NOTES
Chief Complaint   Patient presents with   [facility] Follow Up     1. Have you been to the ER, urgent care clinic since your last visit? Hospitalized since your last visit? Yes When: [address]     2. Have you seen or consulted any other health care providers outside of the [address] [place] [address] since your last visit? Include any pap smears or colon screening.  No    Health Maintenance Due   Topic Date Due    Pneumococcal 0-64 years (1 of 1 - PPSV23) 04/21/1974    Foot Exam Q1  04/21/1978    Eye Exam Retinal or Dilated  04/21/1978    Shingrix Vaccine Age 50> (1 of 2) 04/21/2018    MICROALBUMIN Q1  05/30/2019    Lipid Screen  05/30/2019    A1C test (Diabetic or Prediabetic)  09/14/2019    Colonoscopy  11/27/2019    Flu Vaccine (1) 09/01/2020

## 2020-09-21 NOTE — PROGRESS NOTES
Subjective:   Lul Morton is a 44 y.o. male who was seen by synchronous (real-time) audio-video technology on 9/21/2020 for Hospital Follow Up    Date of Admission: 9/12/2020  Date of Discharge: 9/18/2020  Attending Physician: Nesha Dhillon MD     Discharge Diagnosis:   Acute respiratory failure due to COVID19 PNA  T2DM  GERD    Brief Admission History/Reason for Admission Per Viktoriya Childers MD:   Ashli Shaver a 44 y. o. male, pmhx DM, GERD, who presents by ambulance to the ED c/o SOB 2/2 covid-19.  He notes he began having multiple symptoms 8/31/20. [facility] received outpatient testing and was tested positive for Covid-19 on 9/5/20.  His symptoms that started on 8/31 include nausea/vomiting (2 episodes), anosmia, ageusia, HA, rhinnorhea, myalgias, fever to 104 resolved with acetaminopohen, cough.  He reports tonight, him and his girlfriend got into a verbal argument and he noticed he was SOB with prolonged speech.  He noticed the SOB getting worse throughout the night and presented to the ED for evaluation.  Of note, his girlfriend also tested positive and is in the ED for similar symptoms. [facility] currently denies many of his original symptoms and currently only notes SOB, cough, fatigue.   PCP: Catie Smith, NP   There are no other complaints, changes, or physical findings at this time.      Brief Hospital Course by Main Problems:   Acute respiratory failure due to COVID19 PNA  CXR showed Patchy bilateral airspace disease likely represents pneumonia  Pt was started on IV abx: azithromycin and rocephin which he completed inpt. Patient also completed remdesivir inpt. Decadron was started on admission which he completed 5/10 days inpt. Pt will cont' to take decadron to complete 10 day course. Pt has remained stable on RA for >48 hrs.   Discussed with pulmonary Dr Fabio Tran this AM, he cleared him for discharge.     T2DM, cont' home meds.     GERD Cont PPI          He has not been seen by me since 11/2018, was doing pretty well until he got ill with this. Was seen at [facility] on 9/5/2020 as well and tested + for Covid there. Then no improvement so went to hospital and was admitted, diagnosed with pneumonia. Still having some SOB on exertion. No wheezing. Feeling tired. Has two more days of steroid. Blood sugars are elevated but he expected that, had been in contact with his endocrine [facility]) and they are managing his blood sugars. Has follow up virtually with pulmonary today  Has been using incentive spirometer. Home oxygen saturation 95% per patient  No fever for one week. Prior to Admission medications    Medication Sig Start Date End Date Taking? Authorizing Provider   dexAMETHasone (Decadron) 6 mg tablet Take 1 Tab by mouth Daily (before breakfast). 9/18/20  Yes Cornelia Rodriguez MD   omeprazole (PRILOSEC) 40 mg capsule Take 40 mg by mouth daily. Yes Provider, Historical   cholecalciferol (VITAMIN D3) (2,000 UNITS /50 MCG) cap capsule Take 5,000 Units by mouth daily. Yes Provider, Historical   B-complex with vitamin C (SUPER B COMPLEX-VITAMIN C PO) Take  by mouth. Yes Provider, Historical   phentermine (ADIPEX-P) 37.5 mg tablet Take 37.5 mg by mouth daily. 7/15/19  Yes Provider, Historical   semaglutide (OZEMPIC) 0.25 mg/0.2 mL (2 mg/1.5 mL) sub-q pen 1 mg by SubCUTAneous route every seven (7) days. Yes Provider, Historical   famotidine (Pepcid) 20 mg tablet Take 20 mg by mouth two (2) times a day. Yes Provider, Historical   naproxen sodium (NAPROSYN) 220 mg tablet Take 220 mg by mouth two (2) times daily as needed. Yes Provider, Historical   insulin glargine U-300 conc (TOUJEO MAX U-300 SOLOSTAR) 300 unit/mL (3 mL) inpn 30 Units by SubCUTAneous route daily. Yes Provider, Historical   JARDIANCE 25 mg tablet Take 25 mg by mouth daily. 7/9/18  Yes Provider, Historical   metFORMIN ER 1,000 mg tr24 Take 2,000 mg by mouth daily.    Yes Other, MD Jesus   atorvastatin (LIPITOR) 40 mg tablet Take 40 mg by mouth daily.    Yes Other, MD Jesus     Patient Active Problem List    Diagnosis Date Noted    Pneumonia 09/12/2020    Acute hypoxemic respiratory failure ([facility] [address] 09/12/2020    COVID-19 virus infection 09/12/2020    Hypogonadism male 79/01/8222    Umbilical hernia without obstruction and without gangrene 09/04/2018    Type II or unspecified type diabetes mellitus without mention of complication, uncontrolled 01/14/2014     Past Medical History:   Diagnosis Date    Diabetes ([facility] [address]     GERD (gastroesophageal reflux disease)     Hypercholesterolemia     Ill-defined condition     neuropathy    Psychiatric disorder     past depression    Sleep apnea 2015    uses cpap     Past Surgical History:   Procedure Laterality Date    HX ADENOIDECTOMY      partial     HX HEENT Bilateral     rectus recession eye surgery    HX HERNIA REPAIR  12/06/2018    Robotic-assisted umbilical hernia repair with mesh    HX TONSILLECTOMY      partial    HX UROLOGICAL      hydrocelectomy    HX UROLOGICAL      Spermatocelectomy    HX UROLOGICAL      Varicocele-varicose vein around testicle    HX VASECTOMY      VASCULAR SURGERY PROCEDURE UNLIST Left     inguinal vein ligation       ROS  Gen: +fatigue, no fever or chills  Eyes: no excessive tearing, itching, or discharge  Nose: no rhinorrhea, no sinus pain  Mouth: no oral lesions, no sore throat  Resp: +shortness of breath, no wheezing, +cough  CV: no chest pain, no paroxysmal nocturnal dyspnea  Abd: no nausea, no heartburn, no diarrhea, no constipation, no abdominal pain  Neuro: no headaches, no syncope or presyncopal episodes  Endo: no polyuria, no polydipsia      Objective:     Patient-Reported Vitals 9/21/2020   Patient-Reported Weight 212lb   Patient-Reported Height -   Patient-Reported Pulse -   Patient-Reported Temperature -   Patient-Reported SpO2 -   Patient-Reported Systolic  -   Patient-Reported Diastolic -        [INSTRUCTIONS:  \"[x]\" Indicates a positive item  \"[]\" Indicates a negative item  -- DELETE ALL ITEMS NOT EXAMINED]    Constitutional: [x] Appears well-developed and well-nourished [x] No apparent distress          Mental status: [x] Alert and awake  [x] Oriented to person/place/time [x] Able to follow commands         Eyes:   EOM    [x]  Normal       Sclera  [x]  Normal              Discharge [x]  None visible        HENT: [x] Normocephalic, atraumatic    [x] Mouth/Throat: Mucous membranes are moist    External Ears [x] Normal      Neck: [x] No visualized mass     Pulmonary/Chest: [x] Respiratory effort normal   [x] No visualized signs of difficulty breathing or respiratory distress       Musculoskeletal:   [x] Normal gait with no signs of ataxia         [x] Normal range of motion of neck      Neurological:        [x] No Facial Asymmetry (Cranial nerve 7 motor function) (limited exam due to video visit)          [x] No gaze palsy                  Skin:        [x] No significant exanthematous lesions or discoloration noted on facial skin                    Psychiatric:       [x] Normal Affect         [x] No Hallucinations        Assessment & Plan:   Diagnoses and all orders for this visit:    ICD-10-CM ICD-9-CM    1. Hospital discharge follow-up  Z09 V67.59    2. Pneumonia due to COVID-19 virus  U07.1 480.8     J12.89     3. Type 2 diabetes mellitus without complication, with long-term current use of insulin (HCC)  E11.9 250.00     Z79.4 V58.67      1. Hospital discharge [id number] stable. No refills needed at this time. Has follow up with pulmonary and endocrine scheduled. Should see him in office for routine follow up by me in about 6 weeks. Consider getting pneumonia vaccine at that time if doing well and off steroids for a month. 2. Pneumonia due to COVID-19 virus- improving but still having some STEWARD and fatigue. He will follow up with pulmonary today. Plans to be seen in office for follow up by pulmonary soon.   Continue to use incentive spirometer and medications as prescribed by pulmonary. Consider adding zinc and vitamin c.  Return to ER if SOB at rest or wheezing or fever. 3. Type 2 diabetes mellitus without complication, with long-term current use of insulin ([facility] [address]- continue to monitor blood sugars and communicate with endocrine for treatment adjustments. Says that prior to being on steroids his blood sugars were doing really well and he had lost a lot of weight. We discussed the expected course, resolution and complications of the diagnosis(es) in detail. Medication risks, benefits, costs, interactions, and alternatives were discussed as indicated. I advised him to contact the office if his condition worsens, changes or fails to improve as anticipated. He expressed understanding with the diagnosis(es) and plan. Brady Burger, who was evaluated through a patient-initiated, synchronous (real-time) audio-video encounter, and/or his healthcare decision maker, is aware that it is a billable service, with coverage as determined by his insurance carrier. He provided verbal consent to proceed: Yes, and patient identification was verified. It was conducted pursuant to the emergency declaration under the [facility], [address] [place] [address] authority and the [employer] Act. A caregiver was present when appropriate. Ability to conduct physical exam was limited. I was at home. The patient was at home. Doxy. me used for this visit.     Oneida Swann, NP

## 2020-09-22 NOTE — PROGRESS NOTES
[facility] September 22, 2020       RE: Brady Burger      To Whom It May Concern,    This is to certify that Brady Freeze  Was treated here and in stable condition. May return to work Thursday 9/24/20 with limited duty as tolerated and  Full duty as tolerated 9/28/20 as tolerated . Please feel free to contact my office if you have any questions or concerns. Thank you for your assistance in this matter.       Sincerely,  Priscila Menendez MD

## 2020-09-22 NOTE — TELEPHONE ENCOUNTER
Called patient and he says that he got the hospital to write him a work note to return, does not need a note anymore.

## 2020-10-06 ENCOUNTER — PATIENT OUTREACH (OUTPATIENT)
Dept: CASE MANAGEMENT | Age: 50
End: 2020-10-06

## 2020-10-06 NOTE — PROGRESS NOTES
Care Transitions Nurse Note:  Goals        Post Hospitalization     Supportive resources in place to maintain patient in the community (ie. Home Health, DME equipment, refer to, medication assistant plan, etc.)      9/22/20-  spoke with Mr. Nydia Lim. When CTN reached him- he relayed frustration with the following: he has been in touch with employer- he has all documentation needed except letter to return to work. He has been trying to secure return to work note. He had VV with PCP provider yesterday- they deferred to pulmonary and when he spoke with Dr. Molly Rossi- he said the attending-discharging MD at hospital would have to provide the note. He states that he has left message on phone number documented on his discharge paperwork to reach discharging MD but had not heard back. CTN reached one of the [facility] attendings- Dr. Marquis Elam he was willing to write letter- CTN reached in-pt Therese ARRIETA - she printed and emailed the letter to Mr. Nydia Lim. CTN updated Mr. Nydia Lim and asked him to let me know if he did not receive. [employer]        Understands red flags post discharge. 10/6/20- Left VM asking for return call. review of EMR: he attended VV with PCP on 9/21- she noted continued SOB with exertion, sats 95%, using IS. He was to also have VV with pulmonary same day. He was to return in 6 weeks. [facility]     9/22/20- reached Mr. Nydia Lim. His breathing is much better- he has pulse Ox and is monitoring sats. Fatigues and rests. He is hoarse-laryngitis today. Encouraged him to rest, stay hydrated and focus on good nutrition. He was asking about Vit C and Zinc- advised that some patients were prescribed this and it would help support immune system. He should ask Endocrine or PCP. He has been monitoring blood glucose levels during current steroid RX- have been over 300 and he has been adjusting insulin according to what he and Endocrine have been discussing.  He relays increased urination- he verbalized understanding of blood glucose elimination through urination with current meds he is taking for DM.     LL[employer]

## 2020-10-22 ENCOUNTER — PATIENT OUTREACH (OUTPATIENT)
Dept: CASE MANAGEMENT | Age: 50
End: 2020-10-22

## 2020-10-22 NOTE — PROGRESS NOTES
Patient has graduated from the Transitions of Care Coordination  program on 10/22/2020. Patient/family has the ability to self-manage at this time Care management goals have been completed. Patient was not referred to the [facility] team for further management. Goals Addressed                 This Visit's Progress     COMPLETED: Supportive resources in place to maintain patient in the community (ie. Home Health, DME equipment, refer to, medication assistant plan, etc.)        9/22/20-  spoke with Mr. Princess Delgado. When CTN reached him- he relayed frustration with the following: he has been in touch with employer- he has all documentation needed except letter to return to work. He has been trying to secure return to work note. He had VV with PCP provider yesterday- they deferred to pulmonary and when he spoke with Dr. Mony Cox- he said the attending-discharging MD at hospital would have to provide the note. He states that he has left message on phone number documented on his discharge paperwork to reach discharging MD but had not heard back. CTN reached one of the [facility] attendings- Dr. Rd Kwong he was willing to write letter- CTN reached in-pt Ramiro ARRIETA - she printed and emailed the letter to Mr. Princess Delgado. CTN updated Mr. Princess Delgado and asked him to let me know if he did not receive. LLC        COMPLETED: Understands red flags post discharge. 10/22/20  Attempted to contact patient for follow up, no answer at phone listed. 10/6/20- Left VM asking for return call. review of EMR: he attended VV with PCP on 9/21- she noted continued SOB with exertion, sats 95%, using IS. He was to also have VV with pulmonary same day. He was to return in 6 weeks. [facility]     9/22/20- reached Mr. Princess Delgado. His breathing is much better- he has pulse Ox and is monitoring sats. Fatigues and rests. He is hoarse-laryngitis today. Encouraged him to rest, stay hydrated and focus on good nutrition.   He was asking about Vit C and Zinc- advised that some patients were prescribed this and it would help support immune system. He should ask Endocrine or PCP. He has been monitoring blood glucose levels during current steroid RX- have been over 300 and he has been adjusting insulin according to what he and Endocrine have been discussing. He relays increased urination- he verbalized understanding of blood glucose elimination through urination with current meds he is taking for DM. LLC               Patient has Care Transition Nurse's contact information for any further questions, concerns, or needs.   Patients upcoming visits:    Future Appointments   Date Time Provider Gm Carson   11/3/2020  8:00 AM Polo, Saintclair Foreman, NP [facility] AMB

## 2020-11-17 ENCOUNTER — TRANSCRIBE ORDER (OUTPATIENT)
Dept: REGISTRATION | Age: 50
End: 2020-11-17

## 2020-11-17 ENCOUNTER — HOSPITAL ENCOUNTER (OUTPATIENT)
Dept: GENERAL RADIOLOGY | Age: 50
Discharge: HOME | End: 2020-11-17
Payer: COMMERCIAL

## 2020-11-17 DIAGNOSIS — U07.1 CLINICAL DIAGNOSIS OF SEVERE ACUTE RESPIRATORY SYNDROME CORONAVIRUS 2 (SARS-COV-2) DISEASE: Primary | ICD-10-CM

## 2020-11-17 DIAGNOSIS — U07.1 CLINICAL DIAGNOSIS OF SEVERE ACUTE RESPIRATORY SYNDROME CORONAVIRUS 2 (SARS-COV-2) DISEASE: ICD-10-CM

## 2020-11-17 PROCEDURE — 71046 X-RAY EXAM CHEST 2 VIEWS: CPT

## 2021-06-29 LAB — HBA1C MFR BLD HPLC: 6.7 %

## 2022-03-18 PROBLEM — J96.01 ACUTE HYPOXEMIC RESPIRATORY FAILURE (HCC): Status: ACTIVE | Noted: 2020-09-12

## 2022-03-18 PROBLEM — U07.1 COVID-19 VIRUS INFECTION: Status: ACTIVE | Noted: 2020-09-12

## 2022-03-19 PROBLEM — K42.9 UMBILICAL HERNIA WITHOUT OBSTRUCTION AND WITHOUT GANGRENE: Status: ACTIVE | Noted: 2018-09-04

## 2022-03-19 PROBLEM — J18.9 PNEUMONIA: Status: ACTIVE | Noted: 2020-09-12

## 2022-03-20 PROBLEM — E29.1 HYPOGONADISM MALE: Status: ACTIVE | Noted: 2018-09-04

## 2022-09-20 LAB — HBA1C MFR BLD HPLC: 6.3 %

## 2022-10-17 ENCOUNTER — OFFICE VISIT (OUTPATIENT)
Dept: FAMILY MEDICINE CLINIC | Age: 52
End: 2022-10-17
Payer: COMMERCIAL

## 2022-10-17 VITALS
BODY MASS INDEX: 29.6 KG/M2 | HEIGHT: 73 IN | OXYGEN SATURATION: 98 % | SYSTOLIC BLOOD PRESSURE: 127 MMHG | WEIGHT: 223.3 LBS | RESPIRATION RATE: 20 BRPM | HEART RATE: 85 BPM | TEMPERATURE: 98.4 F | DIASTOLIC BLOOD PRESSURE: 88 MMHG

## 2022-10-17 DIAGNOSIS — K59.03 DRUG-INDUCED CONSTIPATION: ICD-10-CM

## 2022-10-17 DIAGNOSIS — Z12.11 COLON CANCER SCREENING: ICD-10-CM

## 2022-10-17 DIAGNOSIS — Z00.00 WELLNESS EXAMINATION: Primary | ICD-10-CM

## 2022-10-17 DIAGNOSIS — R41.840 CONCENTRATION DEFICIT: ICD-10-CM

## 2022-10-17 DIAGNOSIS — E11.65 TYPE 2 DIABETES MELLITUS WITH HYPERGLYCEMIA, UNSPECIFIED WHETHER LONG TERM INSULIN USE (HCC): ICD-10-CM

## 2022-10-17 DIAGNOSIS — K57.90 DIVERTICULOSIS: ICD-10-CM

## 2022-10-17 DIAGNOSIS — E11.9 TYPE 2 DIABETES MELLITUS WITHOUT COMPLICATION, UNSPECIFIED WHETHER LONG TERM INSULIN USE (HCC): ICD-10-CM

## 2022-10-17 DIAGNOSIS — I73.00 RAYNAUD'S PHENOMENON WITHOUT GANGRENE: ICD-10-CM

## 2022-10-17 PROBLEM — K42.9 UMBILICAL HERNIA WITHOUT OBSTRUCTION AND WITHOUT GANGRENE: Status: RESOLVED | Noted: 2018-09-04 | Resolved: 2022-10-17

## 2022-10-17 PROBLEM — J96.01 ACUTE HYPOXEMIC RESPIRATORY FAILURE (HCC): Status: RESOLVED | Noted: 2020-09-12 | Resolved: 2022-10-17

## 2022-10-17 PROBLEM — U07.1 COVID-19 VIRUS INFECTION: Status: RESOLVED | Noted: 2020-09-12 | Resolved: 2022-10-17

## 2022-10-17 PROBLEM — J18.9 PNEUMONIA: Status: RESOLVED | Noted: 2020-09-12 | Resolved: 2022-10-17

## 2022-10-17 PROCEDURE — 99396 PREV VISIT EST AGE 40-64: CPT | Performed by: NURSE PRACTITIONER

## 2022-10-17 RX ORDER — BLOOD-GLUCOSE SENSOR
EACH MISCELLANEOUS
COMMUNITY
Start: 2022-10-10

## 2022-10-17 RX ORDER — TIRZEPATIDE 12.5 MG/.5ML
INJECTION, SOLUTION SUBCUTANEOUS
COMMUNITY
Start: 2022-10-08

## 2022-10-17 RX ORDER — AMLODIPINE BESYLATE 2.5 MG/1
2.5 TABLET ORAL DAILY
Qty: 90 TABLET | Refills: 1 | Status: SHIPPED | OUTPATIENT
Start: 2022-10-17

## 2022-10-17 RX ORDER — BLOOD-GLUCOSE TRANSMITTER
EACH MISCELLANEOUS
COMMUNITY
Start: 2022-09-08

## 2022-10-17 NOTE — PROGRESS NOTES
Chief Complaint   Patient presents with    Physical     1. Have you been to the ER, urgent care clinic since your last visit? Hospitalized since your last visit? No    2. Have you seen or consulted any other health care providers outside of the [address] [place] [address] since your last visit? Include any pap smears or colon screening.   Endocrine

## 2022-10-17 NOTE — PROGRESS NOTES
Chief Complaint   Patient presents with    Physical         S: Tera Gracia is a 45 y.o. male who presents for wellness physical.       [facility]  Last seen about a month ago, last HgbA1c 6.3 on 9/20/22. No longer on insulin, on GLP1 Mounjara. Phentermine, taking for 30 days then off for 30 days  Was thinking that he does not need the phentermine but thinks that he has ADHD, and would like to have ADHD testing done. Feels much more focused when taking the phentermine but does not really need the phentermine for weight loss anymore. Says that he has hard time focusing when he is off the phentermine. Constipation since on GLP1  Has tried miralax at higher doses, fiber increase, water increase, magnesium supplement, stool softener but still has issues having regular BM. Complains of toes being very cold at times, particularly at night. Sometimes wakes him up because they are so cold, no matter how many blankets he has on them. Denies any systemic symptoms including fever, myalgias, chills, weakness, weight loss and fatigue. Denies respiratory symptoms including cough, SOB, or wheezing. Denies any cardiac symptoms including chest pain, tightness or discomfort or syncope. ROS is otherwise negative. Nutrition: Denies weight problems and eats a well balanced diet. Physical: Pt is active. Social: Denies any recent stressors. Tobacco: Denies smoking or use of other tobacco products  Alcohol: Denies alcohol use        He is here for a full physical.  Health Maintenance  Immunizations:   Influenza: up to date. Cancer screening:     Colon: due; referred to GI. Agree with nurses note.         Past Medical History:   Diagnosis Date    Diabetes ([address]     GERD (gastroesophageal reflux disease)     Hypercholesterolemia     Ill-defined condition     neuropathy    Psychiatric disorder     past depression    Sleep apnea 2015    uses cpap     Past Surgical History:   Procedure Laterality Date    HX ADENOIDECTOMY      partial     HX HEENT Bilateral     rectus recession eye surgery    HX HERNIA REPAIR  12/06/2018    Robotic-assisted umbilical hernia repair with mesh    HX TONSILLECTOMY      partial    HX UROLOGICAL      hydrocelectomy    HX UROLOGICAL      Spermatocelectomy    HX UROLOGICAL      Varicocele-varicose vein around testicle    HX VASECTOMY      VASCULAR SURGERY PROCEDURE UNLIST Left     inguinal vein ligation     Social History     Socioeconomic History    Marital status: SINGLE     Spouse name: Not on file    Number of children: Not on file    Years of education: Not on file    Highest education level: Not on file   Occupational History    Not on file   Tobacco Use    Smoking status: Former     Packs/day: 1.00     Years: 20.00     Pack years: 20.00     Types: Cigarettes    Smokeless tobacco: Never   Substance and Sexual Activity    Alcohol use: Yes     Comment: occasionally    Drug use: No    Sexual activity: Not on file   Other Topics Concern    Not on file   Social History Narrative    Not on file     Social Determinants of Health     Financial Resource Strain: Not on file   Food Insecurity: Not on file   Transportation Needs: Not on file   Physical Activity: Not on file   Stress: Not on file   Social Connections: Not on file   Intimate Partner Violence: Not on file   Housing Stability: Not on file         Current Outpatient Medications   Medication Sig Dispense Refill    Mounjaro 12.5 mg/0.5 mL pnij       Dexcom G6 Sensor pat       Dexcom G6 Transmitter pat       amLODIPine (NORVASC) 2.5 mg tablet Take 1 Tablet by mouth daily. 90 Tablet 1    omeprazole (PRILOSEC) 40 mg capsule Take 40 mg by mouth daily. cholecalciferol (VITAMIN D3) (2,000 UNITS /50 MCG) cap capsule Take 5,000 Units by mouth daily.       B-complex with vitamin C (SUPER B COMPLEX-VITAMIN C PO) Take  by mouth.      phentermine (ADIPEX-P) 37.5 mg tablet Take 37.5 mg by mouth daily. famotidine (PEPCID) 20 mg tablet Take 20 mg by mouth two (2) times a day. naproxen sodium (NAPROSYN) 220 mg tablet Take 220 mg by mouth two (2) times daily as needed. JARDIANCE 25 mg tablet Take 25 mg by mouth daily. metFORMIN ER 1,000 mg tr24 Take 2,000 mg by mouth daily. atorvastatin (LIPITOR) 40 mg tablet Take 40 mg by mouth daily. Pt is taking all medications as prescribed without any side effects or difficulty. Allergies   Allergen Reactions    Chlorhexidine [place] Itching    Dolobid [Diflunisal] Rash    [place] Hives                O: VS: Visit Vitals  /88 (BP 1 Location: Right upper arm, BP Patient Position: Sitting, BP Cuff Size: Adult)   Pulse 85   Temp 98.4 °F (36.9 °C) (Temporal)   Resp 20   Ht 6' 1\" (1.854 m)   Wt 223 lb 4.8 oz (101.3 kg)   SpO2 98%   BMI 29.46 kg/m²     PAIN: No complaints of pain today. GENERAL: Alisia Solares is sitting on the table in no acute distress. Non-toxic. Well nourished. Well developed. Appropriately groomed. He is friendly, social and cooperative. HEAD:  Normocephalic. Atraumatic. Non tender sinuses x 4. EYE: PERRLA. EOMs intact. Sclera anicteric without injection. No drainage or discharge. EARS: Hearing intact bilaterally. External ear canals normal without evidence of blood or swelling. Bilateral TM's intact, pearly grey with landmarks visible. No erythema or effusion. NOSE: Patent. Nasal turbinates pink. No polyps noted. No erythema. No discharge. MOUTH: mucous membranes pink and moist. Posterior pharynx normal with cobblestone appearance. No erythema, white exudate or obstruction. NECK: supple. Midline trachea. No carotid bruits noted bilaterally. No thyromegaly noted. RESP: Breath sounds are symmetrical bilaterally. Unlabored without SOB. Speaking in full sentences. Clear to auscultation bilaterally anteriorly and posteriorly. No wheezes. No rales or rhonchi. CV: normal rate. Regular rhythm. S1, S2 audible. No murmur noted. No rubs, clicks or gallops noted. ABDOMEN: Flat without bulges or pulsations. Soft and nondistended. No tenderness on palpation. No masses or organomegaly. No rebound, rigidity or guarding. Bowel sounds normal x 4 quadrants. BACK: No visible deformities or curvature. Full ROM. No pain on palpation of the spinous processes in the cervical, thoracic, lumbar, sacral regions. No CVA tenderness. NEURO:  awake, alert and oriented to person, place, and time and event. Cranial nerves II through XII intact. Clear speech. Muscle strength is +5/5 x 4 extremities. Sensation is intact to light touch bilaterally. Steady gait. MUSC:  Intact x 4 extremities. Full ROM x 4 extremities. No pain with movement. HEME/LYMPH: peripheral pulses palpable 2+ x 4 extremities. No peripheral edema is noted. No cervical adenopathy noted. SKIN: clean dry and intact throughout. No rashes, erythema, ecchymosis, lacerations, abrasions, suspicious moles noted. PSYCH: appropriate behavior, dress and thought processes. Good eye contact. Clear and coherent speech. Full affect. Good insight. A/P:    Differential diagnosis and treatment options reviewed with patient who is in agreement with treatment plan as outlined below. ICD-10-CM ICD-9-CM    1. Wellness examination  Z00.00 V70.0       2. Diverticulosis  K57.90 562.10 REFERRAL TO COLON AND RECTAL SURGERY      3. Drug-induced constipation  K59.03 564.09 REFERRAL TO COLON AND RECTAL SURGERY     E980.5       4. Colon cancer screening  Z12.11 V76.51 REFERRAL TO COLON AND RECTAL SURGERY      5. Type 2 diabetes mellitus with hyperglycemia, unspecified whether long term insulin use (HCC)  E11.65 250.00       6. Type 2 diabetes mellitus without complication, unspecified whether long term insulin use (HCC)  E11.9 250.00       7.  Concentration deficit  R41.840 799.51 REFERRAL TO NEUROPSYCHOLOGY      8. Raynaud's phenomenon without gangrene I73.00 443.0 amLODIPine (NORVASC) 2.5 mg tablet        BP at goal but wonder if he has some raynaud given his cold feet. Denies numbness or tingling or pain otherwise. Will trial on amlodipine to see if that helps; 2.5 mg nightly initially and can increase to 5 mg if not helping in couple weeks and no side effects. Refer to colorectal for colonoscopy and constipation complaints, history of diverticulosis. Refer to neuropsych for ADHD testing. Will request records from endocrine, they have been doing routine labs on him     Discussed BMI and healthy weight. Encouraged patient to work to implement changes including diet high in raw fruits and vegetables, lean protein and good fats. Limit refined, processed carbohydrates and sugar. Encouraged regular exercise. Advised on nutrition, physical activity, weight management, tobacco, alcohol and safety  RTC as needed. Verbal and written instructions (see AVS) provided. Patient expresses understanding and agreement of diagnosis and treatment plan.

## 2023-03-06 NOTE — PATIENT INSTRUCTIONS
Need CXR,- oredered   Wean off Protonix-  Start taking half tab daily for 2-3 weeks then every other day for one week. Add Zantac or Pepcid in daily while weaning. Also advised to use over the counter nasal saline 2 sprays each nostril 2-3 times per day to assist with eustachian tube draining. Chronic Cough: Care Instructions  Your Care Instructions    A cough is your body's response to something that bothers your throat or airways. Many things can cause a cough. You might cough because of a cold or the flu, bronchitis, or asthma. Smoking, postnasal drip, allergies, and stomach acid that backs up into your throat also can cause a cough. A cough can be short-term (acute) or long-term (chronic). A chronic cough lasts more than 3 weeks. A chronic cough is often caused by a long-term problem, such as asthma. Another cause might be a medicine, such as an ACE inhibitor. A cough is a symptom, not a disease. To treat a chronic cough, you may need to treat the problem that causes it. You can take a few steps at home to cough less and feel better. Some people cough or clear their throat out of habit for no clear reason. Follow-up care is a key part of your treatment and safety. Be sure to make and go to all appointments, and call your doctor if you are having problems. It's also a good idea to know your test results and keep a list of the medicines you take. How can you care for yourself at home? · Drink plenty of water and other fluids. This may help soothe a dry or sore throat. Honey or lemon juice in hot water or tea may ease a dry cough. · Prop up your head on pillows to help you breathe and ease a cough. · Do not smoke or allow others to smoke around you. Smoke can make a cough worse. If you need help quitting, talk to your doctor about stop-smoking programs and medicines. These can increase your chances of quitting for good. · Avoid exposure to smoke, dust, or other pollutants, or wear a face mask. Check with your doctor or pharmacist to find out which type of face mask will give you the most benefit. · Take cough medicine as directed by your doctor. · Try cough drops to soothe a dry or sore throat. Cough drops don't stop a cough. Medicine-flavored cough drops are no better than candy-flavored drops or hard candy. Throat clearing  When you have a chronic cough or a disease that may cause this type of cough, you may often feel like you want to clear your throat. This helps bring up mucus. But throat clearing does not always have a cause. Throat clearing can become a habit. The more you do it, the more you feel like you need to do it. But frequent throat clearing can be hard on your vocal cords. It's like slamming them together. To help lessen throat clearing, you can try:  · Taking small sips of water. · Not clearing your throat when you feel you need to. · Swallowing hard when you want to clear your throat. You may want to ask your doctor if a medicine that thins mucus would help. When should you call for help? Call 911 anytime you think you may need emergency care. For example, call if:  ? · You have severe trouble breathing. ?Call your doctor now or seek immediate medical care if:  ? · You cough up blood. ? · You have new or worse trouble breathing. ? · You have a new or higher fever. ? Watch closely for changes in your health, and be sure to contact your doctor if:  ? · You cough more deeply or more often, especially if you notice more mucus or a change in the color of your mucus. ? · You do not get better as expected. Where can you learn more? Go to http://milind-joe.info/. Enter C427 in the search box to learn more about \"Chronic Cough: Care Instructions. \"  Current as of: May 12, 2017  Content Version: 11.4  © 4307-3786 [employer].  Care instructions adapted under license by [employer] (which disclaims liability or warranty for this information). If you have questions about a medical condition or this instruction, always ask your healthcare professional. [address] any warranty or liability for your use of this information. Learning About Diabetes Food Guidelines  Your Care Instructions    Meal planning is important to manage diabetes. It helps keep your blood sugar at a target level (which you set with your doctor). You don't have to eat special foods. You can eat what your family eats, including sweets once in a while. But you do have to pay attention to how often you eat and how much you eat of certain foods. You may want to work with a dietitian or a certified diabetes educator (CDE) to help you plan meals and snacks. A dietitian or CDE can also help you lose weight if that is one of your goals. What should you know about eating carbs? Managing the amount of carbohydrate (carbs) you eat is an important part of healthy meals when you have diabetes. Carbohydrate is found in many foods. · Learn which foods have carbs. And learn the amounts of carbs in different foods. ¨ Bread, cereal, pasta, and rice have about 15 grams of carbs in a serving. A serving is 1 slice of bread (1 ounce), ½ cup of cooked cereal, or 1/3 cup of cooked pasta or rice. ¨ Fruits have 15 grams of carbs in a serving. A serving is 1 small fresh fruit, such as an apple or orange; ½ of a banana; ½ cup of cooked or canned fruit; ½ cup of fruit juice; 1 cup of melon or raspberries; or 2 tablespoons of dried fruit. ¨ Milk and no-sugar-added yogurt have 15 grams of carbs in a serving. A serving is 1 cup of milk or 2/3 cup of no-sugar-added yogurt. ¨ Starchy vegetables have 15 grams of carbs in a serving. A serving is ½ cup of mashed potatoes or sweet potato; 1 cup winter squash; ½ of a small baked potato; ½ cup of cooked beans; or ½ cup cooked corn or green peas.   · Learn how much carbs to eat each day and at each meal. A dietitian or CDE can teach you how to keep track of the amount of carbs you eat. This is called carbohydrate counting. · If you are not sure how to count carbohydrate grams, use the Plate Method to plan meals. It is a good, quick way to make sure that you have a balanced meal. It also helps you spread carbs throughout the day. ¨ Divide your plate by types of foods. Put non-starchy vegetables on half the plate, meat or other protein food on one-quarter of the plate, and a grain or starchy vegetable in the final quarter of the plate. To this you can add a small piece of fruit and 1 cup of milk or yogurt, depending on how many carbs you are supposed to eat at a meal.  · Try to eat about the same amount of carbs at each meal. Do not \"save up\" your daily allowance of carbs to eat at one meal.  · Proteins have very little or no carbs per serving. Examples of proteins are beef, chicken, turkey, fish, eggs, tofu, cheese, cottage cheese, and peanut butter. A serving size of meat is 3 ounces, which is about the size of a deck of cards. Examples of meat substitute serving sizes (equal to 1 ounce of meat) are 1/4 cup of cottage cheese, 1 egg, 1 tablespoon of peanut butter, and ½ cup of tofu. How can you eat out and still eat healthy? · Learn to estimate the serving sizes of foods that have carbohydrate. If you measure food at home, it will be easier to estimate the amount in a serving of restaurant food. · If the meal you order has too much carbohydrate (such as potatoes, corn, or baked beans), ask to have a low-carbohydrate food instead. Ask for a salad or green vegetables. · If you use insulin, check your blood sugar before and after eating out to help you plan how much to eat in the future. · If you eat more carbohydrate at a meal than you had planned, take a walk or do other exercise. This will help lower your blood sugar. What else should you know? · Limit saturated fat, such as the fat from meat and dairy products.  This is a healthy choice because people who have diabetes are at higher risk of heart disease. So choose lean cuts of meat and nonfat or low-fat dairy products. Use olive or canola oil instead of butter or shortening when cooking. · Don't skip meals. Your blood sugar may drop too low if you skip meals and take insulin or certain medicines for diabetes. · Check with your doctor before you drink alcohol. Alcohol can cause your blood sugar to drop too low. Alcohol can also cause a bad reaction if you take certain diabetes medicines. Follow-up care is a key part of your treatment and safety. Be sure to make and go to all appointments, and call your doctor if you are having problems. It's also a good idea to know your test results and keep a list of the medicines you take. Where can you learn more? Go to http://milind-joe.info/. Enter C336 in the search box to learn more about \"Learning About Diabetes Food Guidelines. \"  Current as of: March 13, 2017  Content Version: 11.4  © 6123-2670 [employer]. Care instructions adapted under license by [employer] (which disclaims liability or warranty for this information). If you have questions about a medical condition or this instruction, always ask your healthcare professional. [address] any warranty or liability for your use of this information. Bi-Rhombic Flap Text: The defect edges were debeveled with a #15 scalpel blade.  Given the location of the defect and the proximity to free margins a bi-rhombic flap was deemed most appropriate.  Using a sterile surgical marker, an appropriate rhombic flap was drawn incorporating the defect. The area thus outlined was incised deep to adipose tissue with a #15 scalpel blade.  The skin margins were undermined to an appropriate distance in all directions utilizing iris scissors.

## 2023-05-22 RX ORDER — NAPROXEN SODIUM 220 MG
220 TABLET ORAL 2 TIMES DAILY PRN
COMMUNITY

## 2023-05-22 RX ORDER — OMEPRAZOLE 40 MG/1
40 CAPSULE, DELAYED RELEASE ORAL DAILY
COMMUNITY

## 2023-05-22 RX ORDER — AMLODIPINE BESYLATE 2.5 MG/1
2.5 TABLET ORAL DAILY
COMMUNITY
Start: 2022-10-17

## 2023-05-22 RX ORDER — PHENTERMINE HYDROCHLORIDE 37.5 MG/1
37.5 TABLET ORAL DAILY
COMMUNITY
Start: 2019-07-15

## 2023-05-22 RX ORDER — TIRZEPATIDE 12.5 MG/.5ML
INJECTION, SOLUTION SUBCUTANEOUS
COMMUNITY
Start: 2022-10-08

## 2023-05-22 RX ORDER — METFORMIN HYDROCHLORIDE EXTENDED-RELEASE TABLETS 1000 MG/1
2000 TABLET, FILM COATED, EXTENDED RELEASE ORAL DAILY
COMMUNITY

## 2023-05-22 RX ORDER — ATORVASTATIN CALCIUM 40 MG/1
40 TABLET, FILM COATED ORAL DAILY
COMMUNITY

## 2023-05-22 RX ORDER — FAMOTIDINE 20 MG/1
20 TABLET, FILM COATED ORAL 2 TIMES DAILY
COMMUNITY

## 2024-06-14 LAB
HBA1C MFR BLD HPLC: 6.4 %
LDL CHOLESTEROL, EXTERNAL: 58
MICROALBUMIN/CREATININE RATIO, EXTERNAL: 0.3
PSA, EXTERNAL: 0.78

## 2024-08-29 SDOH — HEALTH STABILITY: PHYSICAL HEALTH: ON AVERAGE, HOW MANY MINUTES DO YOU ENGAGE IN EXERCISE AT THIS LEVEL?: 20 MIN

## 2024-08-29 SDOH — HEALTH STABILITY: PHYSICAL HEALTH: ON AVERAGE, HOW MANY DAYS PER WEEK DO YOU ENGAGE IN MODERATE TO STRENUOUS EXERCISE (LIKE A BRISK WALK)?: 3 DAYS

## 2024-08-30 ENCOUNTER — OFFICE VISIT (OUTPATIENT)
Age: 54
End: 2024-08-30

## 2024-08-30 VITALS
RESPIRATION RATE: 16 BRPM | BODY MASS INDEX: 28.31 KG/M2 | HEIGHT: 73 IN | OXYGEN SATURATION: 97 % | HEART RATE: 80 BPM | SYSTOLIC BLOOD PRESSURE: 115 MMHG | DIASTOLIC BLOOD PRESSURE: 81 MMHG | TEMPERATURE: 97.7 F | WEIGHT: 213.6 LBS

## 2024-08-30 DIAGNOSIS — M53.3 SACRAL PAIN: ICD-10-CM

## 2024-08-30 DIAGNOSIS — Z76.89 ENCOUNTER TO ESTABLISH CARE WITH NEW DOCTOR: ICD-10-CM

## 2024-08-30 DIAGNOSIS — E11.65 TYPE 2 DIABETES MELLITUS WITH HYPERGLYCEMIA, WITHOUT LONG-TERM CURRENT USE OF INSULIN (HCC): ICD-10-CM

## 2024-08-30 DIAGNOSIS — Z11.4 SCREENING FOR HIV (HUMAN IMMUNODEFICIENCY VIRUS): ICD-10-CM

## 2024-08-30 DIAGNOSIS — Z00.00 WELLNESS EXAMINATION: Primary | ICD-10-CM

## 2024-08-30 DIAGNOSIS — R10.11 RIGHT UPPER QUADRANT ABDOMINAL PAIN: ICD-10-CM

## 2024-08-30 DIAGNOSIS — Z11.59 ENCOUNTER FOR HEPATITIS C SCREENING TEST FOR LOW RISK PATIENT: ICD-10-CM

## 2024-08-30 PROBLEM — E11.9 TYPE 2 DIABETES MELLITUS (HCC): Status: RESOLVED | Noted: 2022-10-17 | Resolved: 2024-08-30

## 2024-08-30 RX ORDER — CALCIUM CARBONATE 300MG(750)
400 TABLET,CHEWABLE ORAL DAILY
COMMUNITY

## 2024-08-30 RX ORDER — TADALAFIL 20 MG/1
20 TABLET ORAL PRN
COMMUNITY

## 2024-08-30 RX ORDER — CYCLOBENZAPRINE HCL 10 MG
10 TABLET ORAL 2 TIMES DAILY PRN
COMMUNITY
Start: 2019-09-21 | End: 2024-08-30

## 2024-08-30 RX ORDER — SILDENAFIL 50 MG/1
50 TABLET, FILM COATED ORAL PRN
COMMUNITY

## 2024-08-30 RX ORDER — SAXAGLIPTIN AND METFORMIN HYDROCHLORIDE 1000; 5 MG/1; MG/1
2000 TABLET, FILM COATED, EXTENDED RELEASE ORAL EVERY 24 HOURS
COMMUNITY
End: 2024-08-30

## 2024-08-30 SDOH — ECONOMIC STABILITY: FOOD INSECURITY: WITHIN THE PAST 12 MONTHS, YOU WORRIED THAT YOUR FOOD WOULD RUN OUT BEFORE YOU GOT MONEY TO BUY MORE.: NEVER TRUE

## 2024-08-30 SDOH — ECONOMIC STABILITY: INCOME INSECURITY: HOW HARD IS IT FOR YOU TO PAY FOR THE VERY BASICS LIKE FOOD, HOUSING, MEDICAL CARE, AND HEATING?: NOT HARD AT ALL

## 2024-08-30 SDOH — ECONOMIC STABILITY: FOOD INSECURITY: WITHIN THE PAST 12 MONTHS, THE FOOD YOU BOUGHT JUST DIDN'T LAST AND YOU DIDN'T HAVE MONEY TO GET MORE.: NEVER TRUE

## 2024-08-30 ASSESSMENT — PATIENT HEALTH QUESTIONNAIRE - PHQ9
1. LITTLE INTEREST OR PLEASURE IN DOING THINGS: NOT AT ALL
SUM OF ALL RESPONSES TO PHQ QUESTIONS 1-9: 0
2. FEELING DOWN, DEPRESSED OR HOPELESS: NOT AT ALL
SUM OF ALL RESPONSES TO PHQ9 QUESTIONS 1 & 2: 0
SUM OF ALL RESPONSES TO PHQ QUESTIONS 1-9: 0

## 2024-08-30 NOTE — PROGRESS NOTES
Keegan Riley  54 y.o. male  1970  [address]  [facility] [address]  [phone]     [facility]       Chief Complaint:   Chief Complaint   Patient presents with    New Patient     Establish Care / Sees Anabelle Cueva   Source: self, the medical record     Subjective:   Keegan Riley is an 54 y.o. male who presents for complete physical exam.    DM: sees Dr Anabelle Cueva at [facility]. On mounjaro 15mg qweek, metformin 2000mg daily, jardiance 25mg daily and lipitor 40mg daily;A1c 6.4 6/14/24    Having pain when going from sitting to standing and with extended sitting - started with recent weight loss of 100lb. max 205lb.    When laying flat on his back gets a pain beneath the right rib bone. Relieved immediately with rotating to his left side or sitting      Allergies - reviewed:   Allergies   Allergen Reactions    Chlorhexidine Itching    Mangif[place] Hives    Diflunisal Rash         Medications - reviewed:  Current Outpatient Medications   Medication Sig    sildenafil (VIAGRA) 50 MG tablet Take 1 tablet by mouth as needed for Erectile Dysfunction    tadalafil (CIALIS) 20 MG tablet Take 1 tablet by mouth as needed for Erectile Dysfunction    Magnesium 400 MG TABS Take 400 mg by mouth daily    metFORMIN (GLUCOPHAGE) 1000 MG tablet Take 1 tablet by mouth daily    atorvastatin (LIPITOR) 40 MG tablet Take 1 tablet by mouth daily    Cholecalciferol 50 MCG (2000 UT) CAPS Take 5,000 Units by mouth daily    empagliflozin (JARDIANCE) 25 MG tablet Take 1 tablet by mouth daily    famotidine (PEPCID) 20 MG tablet Take 1 tablet by mouth 2 times daily as needed    naproxen sodium (ANAPROX) 220 MG tablet Take 1 tablet by mouth 2 times daily as needed    omeprazole (PRILOSEC) 40 MG delayed release capsule Take 1 capsule by mouth daily    Tirzepatide (MOUNJARO) 15 MG/0.5ML SOPN SC injection Inject 0.5 mLs into the skin once a week ceived the following from [facility]  (8/30/2024)    Overall Financial Resource Strain (CARDIA)     Difficulty of Paying Living Expenses: Not hard at all   Food Insecurity: No Food Insecurity (8/30/2024)    Hunger Vital Sign     Worried About Running Out of Food in the Last Year: Never true     Ran Out of Food in the Last Year: Never true   Transportation Needs: Unknown (8/30/2024)    PRAPARE - Transportation     Lack of Transportation (Medical): Not on file     Lack of Transportation (Non-Medical): No   Physical Activity: Insufficiently Active (8/29/2024)    Exercise Vital Sign     Days of Exercise per Week: 3 days     Minutes of Exercise per Session: 20 min   Stress: Not on file   Social Connections: Not on file   Intimate Partner Violence: Not on file   Housing Stability: Unknown (8/30/2024)    Housing Stability Vital Sign     Unable to Pay for Housing in the Last Year: Not on file     Number of Times Moved in the Last Year: Not on file     Homeless in the Last Year: No         Immunizations - reviewed:   Immunization History   Administered Date(s) Administered    Influenza Trivalent 12/03/2013    TDaP, ADACEL (age 10y-64y), BOOSTRIX (age 10y+), IM, 0.5mL 09/04/2018     Flu/covid: recommended for fall 2024  Tdap: UTD      Health Maintenance reviewed -  Colonoscopy UTD,most recent requested   HIV testing discussed and ordered today  Hep C Screen discussed and ordered today  Depression Screen PHQ-9 Total Score: 0 (8/30/2024  3:43 PM)       ROS  Negative except what is mentioned in HPI    Objective:   Visit Vitals  /81 (Site: Right Upper Arm, Position: Sitting, Cuff Size: Medium Adult)   Pulse 80   Temp 97.7 °F (36.5 °C) (Temporal)   Resp 16   Ht 1.854 m (6' 1\")   Wt 96.9 kg (213 lb 9.6 oz)   SpO2 97%   BMI 28.18 kg/m²         Physical Exam  Vitals and nursing note reviewed.   Constitutional:       General: He is not in acute distress.     Appearance: Normal appearance. He is normal weight. He is not ill-appearing, toxic-appearing or diaphoretic.

## 2024-08-30 NOTE — PROGRESS NOTES
Chief Complaint   Patient presents with    New Patient     Establish Care / Sees Anabelle Cueva

## 2024-08-30 NOTE — PATIENT INSTRUCTIONS
To schedule your gallbladder US, X ray call [phone]     Please sign a release of records form for Dr Cueva's office for last labs and office note    [facility]  [address], [facility]  [phone]        [facility]   [address], [address]  [facility] [address]  Phone: [phone]    [facility]   [address] [place] [address] [address]  [place] [address]  Phone: [phone]    [facility]  [address] [facility] [address],   [place] [address] [phone]

## 2024-09-19 ENCOUNTER — HOSPITAL ENCOUNTER (OUTPATIENT)
Facility: HOSPITAL | Age: 54
Discharge: HOME | End: 2024-09-22
Attending: STUDENT IN AN ORGANIZED HEALTH CARE EDUCATION/TRAINING PROGRAM
Payer: COMMERCIAL

## 2024-09-19 DIAGNOSIS — M53.3 SACRAL PAIN: ICD-10-CM

## 2024-09-19 DIAGNOSIS — R10.11 RIGHT UPPER QUADRANT ABDOMINAL PAIN: ICD-10-CM

## 2024-09-19 PROCEDURE — 72100 X-RAY EXAM L-S SPINE 2/3 VWS: CPT

## 2024-09-19 PROCEDURE — 76705 ECHO EXAM OF ABDOMEN: CPT

## 2024-11-02 ENCOUNTER — OFFICE VISIT (OUTPATIENT)
Age: 54
End: 2024-11-02

## 2024-11-02 VITALS
HEART RATE: 82 BPM | WEIGHT: 210 LBS | DIASTOLIC BLOOD PRESSURE: 87 MMHG | SYSTOLIC BLOOD PRESSURE: 125 MMHG | BODY MASS INDEX: 27.71 KG/M2 | OXYGEN SATURATION: 97 % | TEMPERATURE: 98.2 F

## 2024-11-02 DIAGNOSIS — J02.9 SORE THROAT: ICD-10-CM

## 2024-11-02 DIAGNOSIS — J02.9 PHARYNGITIS, UNSPECIFIED ETIOLOGY: Primary | ICD-10-CM

## 2024-11-02 LAB
STREP PYOGENES DNA, POC: NEGATIVE
VALID INTERNAL CONTROL, POC: YES

## 2024-11-02 RX ORDER — TIRZEPATIDE 15 MG/.5ML
INJECTION, SOLUTION SUBCUTANEOUS
COMMUNITY
Start: 2024-11-02 | End: 2024-11-02

## 2024-11-02 NOTE — PROGRESS NOTES
Patient Name: Keegan Riley   YOB: 1970   Patient Status: Established patient,   Chief Complaint: Pharyngitis (Patient presents today with c/o sore throat x1.5 days. Reports redness and pain with swallowing. Mild  cough noted. )      ____________________________________________________________________________________________    External Records Reviewed: none    Limitation to History: none    Outside Historian: none    SUBJECTIVE/OBJECTIVE:  Keegan Riley is a 54 y.o. male presents with complaint of sore throat with mild congestion and dry cough.  Symptoms began yesterday and are worsening since onset.  Patient describes pain as burning and scratchy, 4/10 in severity, constant and with no radiation.  Symptoms are aggravated by swallowing and alleviated by nothing. The patient denies fever, trouble tolerating secretions or oral fluids, or difficulty breathing.  Patient reports he is getting  next weekend and wanted to get treatment started early for possible strep.   No other acute symptoms reported at this time.         PAST MEDICAL HISTORY:   Medical: Pt  has a past medical history of Acute hypoxemic respiratory failure (9/12/2020), COVID-19 virus infection (9/12/2020), Diabetes (HCC), GERD (gastroesophageal reflux disease), Hypercholesterolemia, Ill-defined condition, Pneumonia (9/12/2020), Psychiatric disorder, Sleep apnea (2015), and Umbilical hernia without obstruction and without gangrene (9/4/2018).  Surgical: Pt  has a past surgical history that includes Urological Surgery; hernia repair (12/06/2018); Urological Surgery; Urological Surgery; Vasectomy; vascular surgery (Left); heent (Bilateral); Tonsillectomy; and Adenoidectomy.  Family: Pt family history is not on file.  Social: Pt   Social History     Socioeconomic History    Marital status:      Spouse name: Not on file    Number of children: Not on file    Years of education: Not on file    Highest education level: Not

## 2024-11-02 NOTE — PATIENT INSTRUCTIONS
Warm salt water gargles 2-3 times daily  To prevent dehydration, drink plenty of fluids. Choose water and other clear liquids until you feel better.   Take an over-the-counter pain medicine, such as acetaminophen (Tylenol), ibuprofen (Advil, Motrin), or naproxen (Aleve) for fever or pain, can take Tylenol and Motrin together for more significant pain or can alternate every 4 hours for pain / fever. Be safe with medicines. Read and follow all instructions on the label. No one younger than 18 should take aspirin. It has been linked to Reye syndrome, a serious illness.  Be careful when taking over-the-counter cold or influenza (flu) medicines and Tylenol at the same time. Many of these medicines have acetaminophen, which is Tylenol. Read the labels to make sure that you are not taking more than the recommended dose. Too much acetaminophen (Tylenol) can be harmful.  Get plenty of rest.  Use saline (saltwater) nasal washes to help keep your nasal passages open and wash out mucus and allergens. You can buy saline nose sprays at a grocery store or drugstore. Follow the instructions on the package.   Use a vaporizer or humidifier to add moisture to your bedroom. Follow the instructions for cleaning the machine.   Recommend warm tea with honey, saltwater gargles and other natural remedies for symptoms.   Do not smoke or allow others to smoke around you. If you need help quitting, talk to your doctor about stop-smoking programs and medicines. These can increase your chances of quitting for good.  Return to clinic if symptoms change, fever, or mild worsening of symptoms.  Go to ER immediately if chest pain, shortness of breath, dehydration, high or persistent fevers or any new concerning symptoms.  Follow up with PCP in 3-5 days for persistent or recurrent symptoms.

## 2024-11-06 LAB — S PYO THROAT QL CULT: ABNORMAL

## 2024-11-07 ENCOUNTER — TELEPHONE (OUTPATIENT)
Age: 54
End: 2024-11-07

## 2024-11-07 DIAGNOSIS — J02.8 ACUTE BACTERIAL PHARYNGITIS: Primary | ICD-10-CM

## 2024-11-07 DIAGNOSIS — B96.89 ACUTE BACTERIAL PHARYNGITIS: Primary | ICD-10-CM

## 2024-11-07 RX ORDER — AMOXICILLIN 500 MG/1
500 CAPSULE ORAL 2 TIMES DAILY
Qty: 20 CAPSULE | Refills: 0 | Status: SHIPPED | OUTPATIENT
Start: 2024-11-07 | End: 2024-11-17

## 2024-11-07 NOTE — TELEPHONE ENCOUNTER
Patient noted results of strep culture and is requesting antibiotic since he is still symptomatic and is getting  in 2 days.

## 2025-03-31 LAB
HBA1C MFR BLD HPLC: 6.7 %
PSA, EXTERNAL: 1

## 2025-08-02 SDOH — HEALTH STABILITY: PHYSICAL HEALTH: ON AVERAGE, HOW MANY DAYS PER WEEK DO YOU ENGAGE IN MODERATE TO STRENUOUS EXERCISE (LIKE A BRISK WALK)?: 3 DAYS

## 2025-08-02 SDOH — HEALTH STABILITY: PHYSICAL HEALTH: ON AVERAGE, HOW MANY MINUTES DO YOU ENGAGE IN EXERCISE AT THIS LEVEL?: 30 MIN

## 2025-08-05 ENCOUNTER — OFFICE VISIT (OUTPATIENT)
Facility: CLINIC | Age: 55
End: 2025-08-05
Payer: COMMERCIAL

## 2025-08-05 VITALS
BODY MASS INDEX: 29.31 KG/M2 | RESPIRATION RATE: 12 BRPM | WEIGHT: 221.2 LBS | OXYGEN SATURATION: 95 % | HEART RATE: 75 BPM | HEIGHT: 73 IN | DIASTOLIC BLOOD PRESSURE: 74 MMHG | SYSTOLIC BLOOD PRESSURE: 119 MMHG

## 2025-08-05 DIAGNOSIS — E11.40 TYPE 2 DIABETES MELLITUS WITH DIABETIC NEUROPATHY, WITHOUT LONG-TERM CURRENT USE OF INSULIN (HCC): Primary | ICD-10-CM

## 2025-08-05 DIAGNOSIS — Z29.9 PREVENTIVE MEASURE: ICD-10-CM

## 2025-08-05 DIAGNOSIS — G47.33 OBSTRUCTIVE SLEEP APNEA SYNDROME: ICD-10-CM

## 2025-08-05 DIAGNOSIS — E55.9 VITAMIN D DEFICIENCY: ICD-10-CM

## 2025-08-05 DIAGNOSIS — F99 PSYCHIATRIC DISORDER: ICD-10-CM

## 2025-08-05 DIAGNOSIS — E78.2 MIXED HYPERLIPIDEMIA: ICD-10-CM

## 2025-08-05 PROBLEM — I10 ESSENTIAL HYPERTENSION: Status: RESOLVED | Noted: 2025-08-05 | Resolved: 2025-08-05

## 2025-08-05 PROBLEM — E66.3 OVERWEIGHT: Status: ACTIVE | Noted: 2025-08-05

## 2025-08-05 PROBLEM — E78.5 HYPERLIPIDEMIA: Status: ACTIVE | Noted: 2025-08-05

## 2025-08-05 PROBLEM — N52.9 ERECTILE DYSFUNCTION: Status: ACTIVE | Noted: 2025-08-05

## 2025-08-05 PROBLEM — E29.1 HYPOGONADISM MALE: Status: RESOLVED | Noted: 2018-09-04 | Resolved: 2025-08-05

## 2025-08-05 PROBLEM — I10 ESSENTIAL HYPERTENSION: Status: ACTIVE | Noted: 2025-08-05

## 2025-08-05 PROCEDURE — 99204 OFFICE O/P NEW MOD 45 MIN: CPT | Performed by: NURSE PRACTITIONER

## 2025-08-05 SDOH — ECONOMIC STABILITY: FOOD INSECURITY: WITHIN THE PAST 12 MONTHS, THE FOOD YOU BOUGHT JUST DIDN'T LAST AND YOU DIDN'T HAVE MONEY TO GET MORE.: NEVER TRUE

## 2025-08-05 SDOH — ECONOMIC STABILITY: FOOD INSECURITY: WITHIN THE PAST 12 MONTHS, YOU WORRIED THAT YOUR FOOD WOULD RUN OUT BEFORE YOU GOT MONEY TO BUY MORE.: NEVER TRUE

## 2025-08-05 SDOH — ECONOMIC STABILITY: TRANSPORTATION INSECURITY
IN THE PAST 12 MONTHS, HAS LACK OF TRANSPORTATION KEPT YOU FROM MEETINGS, WORK, OR FROM GETTING THINGS NEEDED FOR DAILY LIVING?: NO

## 2025-08-05 SDOH — ECONOMIC STABILITY: INCOME INSECURITY: IN THE LAST 12 MONTHS, WAS THERE A TIME WHEN YOU WERE NOT ABLE TO PAY THE MORTGAGE OR RENT ON TIME?: NO

## 2025-08-05 SDOH — ECONOMIC STABILITY: TRANSPORTATION INSECURITY
IN THE PAST 12 MONTHS, HAS THE LACK OF TRANSPORTATION KEPT YOU FROM MEDICAL APPOINTMENTS OR FROM GETTING MEDICATIONS?: NO

## 2025-08-05 ASSESSMENT — PATIENT HEALTH QUESTIONNAIRE - PHQ9
SUM OF ALL RESPONSES TO PHQ QUESTIONS 1-9: 9
7. TROUBLE CONCENTRATING ON THINGS, SUCH AS READING THE NEWSPAPER OR WATCHING TELEVISION: NEARLY EVERY DAY
3. TROUBLE FALLING OR STAYING ASLEEP: SEVERAL DAYS
8. MOVING OR SPEAKING SO SLOWLY THAT OTHER PEOPLE COULD HAVE NOTICED. OR THE OPPOSITE - BEING SO FIDGETY OR RESTLESS THAT YOU HAVE BEEN MOVING AROUND A LOT MORE THAN USUAL: NEARLY EVERY DAY
5. POOR APPETITE OR OVEREATING: NOT AT ALL
2. FEELING DOWN, DEPRESSED OR HOPELESS: SEVERAL DAYS
6. FEELING BAD ABOUT YOURSELF - OR THAT YOU ARE A FAILURE OR HAVE LET YOURSELF OR YOUR FAMILY DOWN: NOT AT ALL
1. LITTLE INTEREST OR PLEASURE IN DOING THINGS: NOT AT ALL
7. TROUBLE CONCENTRATING ON THINGS, SUCH AS READING THE NEWSPAPER OR WATCHING TELEVISION: NEARLY EVERY DAY
10. IF YOU CHECKED OFF ANY PROBLEMS, HOW DIFFICULT HAVE THESE PROBLEMS MADE IT FOR YOU TO DO YOUR WORK, TAKE CARE OF THINGS AT HOME, OR GET ALONG WITH OTHER PEOPLE: SOMEWHAT DIFFICULT
5. POOR APPETITE OR OVEREATING: NOT AT ALL
6. FEELING BAD ABOUT YOURSELF - OR THAT YOU ARE A FAILURE OR HAVE LET YOURSELF OR YOUR FAMILY DOWN: NOT AT ALL
4. FEELING TIRED OR HAVING LITTLE ENERGY: SEVERAL DAYS
9. THOUGHTS THAT YOU WOULD BE BETTER OFF DEAD, OR OF HURTING YOURSELF: NOT AT ALL
3. TROUBLE FALLING OR STAYING ASLEEP: SEVERAL DAYS
1. LITTLE INTEREST OR PLEASURE IN DOING THINGS: NOT AT ALL
8. MOVING OR SPEAKING SO SLOWLY THAT OTHER PEOPLE COULD HAVE NOTICED. OR THE OPPOSITE, BEING SO FIGETY OR RESTLESS THAT YOU HAVE BEEN MOVING AROUND A LOT MORE THAN USUAL: NEARLY EVERY DAY
2. FEELING DOWN, DEPRESSED OR HOPELESS: SEVERAL DAYS
10. IF YOU CHECKED OFF ANY PROBLEMS, HOW DIFFICULT HAVE THESE PROBLEMS MADE IT FOR YOU TO DO YOUR WORK, TAKE CARE OF THINGS AT HOME, OR GET ALONG WITH OTHER PEOPLE: SOMEWHAT DIFFICULT
4. FEELING TIRED OR HAVING LITTLE ENERGY: SEVERAL DAYS
9. THOUGHTS THAT YOU WOULD BE BETTER OFF DEAD, OR OF HURTING YOURSELF: NOT AT ALL
SUM OF ALL RESPONSES TO PHQ QUESTIONS 1-9: 9
SUM OF ALL RESPONSES TO PHQ QUESTIONS 1-9: 9

## 2025-08-08 ENCOUNTER — HOSPITAL ENCOUNTER (EMERGENCY)
Facility: HOSPITAL | Age: 55
Discharge: HOME | End: 2025-08-08
Attending: EMERGENCY MEDICINE
Payer: COMMERCIAL

## 2025-08-08 ENCOUNTER — APPOINTMENT (OUTPATIENT)
Facility: HOSPITAL | Age: 55
End: 2025-08-08
Payer: COMMERCIAL

## 2025-08-08 VITALS
BODY MASS INDEX: 29.31 KG/M2 | WEIGHT: 221.12 LBS | DIASTOLIC BLOOD PRESSURE: 89 MMHG | RESPIRATION RATE: 20 BRPM | HEART RATE: 81 BPM | HEIGHT: 73 IN | SYSTOLIC BLOOD PRESSURE: 141 MMHG | OXYGEN SATURATION: 93 % | TEMPERATURE: 97.7 F

## 2025-08-08 DIAGNOSIS — R07.9 CHEST PAIN, UNSPECIFIED TYPE: Primary | ICD-10-CM

## 2025-08-08 DIAGNOSIS — K80.20 CALCULUS OF GALLBLADDER WITHOUT CHOLECYSTITIS WITHOUT OBSTRUCTION: ICD-10-CM

## 2025-08-08 LAB
ALBUMIN SERPL-MCNC: 4.5 G/DL (ref 3.5–5.2)
ALBUMIN/GLOB SERPL: 1.8 (ref 1.1–2.2)
ALP SERPL-CCNC: 67 U/L (ref 40–129)
ALT SERPL-CCNC: 45 U/L (ref 10–50)
ANION GAP SERPL CALC-SCNC: 14 MMOL/L (ref 2–14)
AST SERPL-CCNC: 28 U/L (ref 10–50)
BASOPHILS # BLD: 0.05 K/UL (ref 0–0.1)
BASOPHILS NFR BLD: 0.8 % (ref 0–1)
BILIRUB SERPL-MCNC: 1.1 MG/DL (ref 0–1.2)
BUN SERPL-MCNC: 16 MG/DL (ref 6–20)
BUN/CREAT SERPL: 15 (ref 12–20)
CALCIUM SERPL-MCNC: 9.8 MG/DL (ref 8.6–10)
CHLORIDE SERPL-SCNC: 101 MMOL/L (ref 98–107)
CO2 SERPL-SCNC: 24 MMOL/L (ref 20–29)
CREAT SERPL-MCNC: 1.08 MG/DL (ref 0.7–1.2)
DIFFERENTIAL METHOD BLD: NORMAL
EKG ATRIAL RATE: 78 BPM
EKG DIAGNOSIS: NORMAL
EKG P AXIS: 14 DEGREES
EKG P-R INTERVAL: 142 MS
EKG Q-T INTERVAL: 394 MS
EKG QRS DURATION: 94 MS
EKG QTC CALCULATION (BAZETT): 449 MS
EKG R AXIS: -8 DEGREES
EKG T AXIS: 17 DEGREES
EKG VENTRICULAR RATE: 78 BPM
EOSINOPHIL # BLD: 0.23 K/UL (ref 0–0.4)
EOSINOPHIL NFR BLD: 3.7 % (ref 0–7)
ERYTHROCYTE [DISTWIDTH] IN BLOOD BY AUTOMATED COUNT: 13.1 % (ref 11.5–14.5)
GLOBULIN SER CALC-MCNC: 2.5 G/DL (ref 2–4)
GLUCOSE SERPL-MCNC: 120 MG/DL (ref 65–100)
HCT VFR BLD AUTO: 47.8 % (ref 36.6–50.3)
HGB BLD-MCNC: 15.4 G/DL (ref 12.1–17)
IMM GRANULOCYTES # BLD AUTO: 0.02 K/UL (ref 0–0.04)
IMM GRANULOCYTES NFR BLD AUTO: 0.3 % (ref 0–0.5)
LYMPHOCYTES # BLD: 1.82 K/UL (ref 0.8–3.5)
LYMPHOCYTES NFR BLD: 29.2 % (ref 12–49)
MCH RBC QN AUTO: 29.2 PG (ref 26–34)
MCHC RBC AUTO-ENTMCNC: 32.2 G/DL (ref 30–36.5)
MCV RBC AUTO: 90.5 FL (ref 80–99)
MONOCYTES # BLD: 0.48 K/UL (ref 0–1)
MONOCYTES NFR BLD: 7.7 % (ref 5–13)
NEUTS SEG # BLD: 3.64 K/UL (ref 1.8–8)
NEUTS SEG NFR BLD: 58.3 % (ref 32–75)
NRBC # BLD: 0 K/UL (ref 0–0.01)
NRBC BLD-RTO: 0 PER 100 WBC
PLATELET # BLD AUTO: 192 K/UL (ref 150–400)
PMV BLD AUTO: 10.2 FL (ref 8.9–12.9)
POTASSIUM SERPL-SCNC: 4.3 MMOL/L (ref 3.5–5.1)
PROT SERPL-MCNC: 7.1 G/DL (ref 6.4–8.3)
RBC # BLD AUTO: 5.28 M/UL (ref 4.1–5.7)
SODIUM SERPL-SCNC: 139 MMOL/L (ref 136–145)
TROPONIN T SERPL HS-MCNC: 11.4 NG/L (ref 0–22)
TROPONIN T SERPL HS-MCNC: 9.1 NG/L (ref 0–22)
WBC # BLD AUTO: 6.2 K/UL (ref 4.1–11.1)

## 2025-08-08 PROCEDURE — 71275 CT ANGIOGRAPHY CHEST: CPT

## 2025-08-08 PROCEDURE — 6360000004 HC RX CONTRAST MEDICATION: Performed by: RADIOLOGY

## 2025-08-08 PROCEDURE — 36415 COLL VENOUS BLD VENIPUNCTURE: CPT

## 2025-08-08 PROCEDURE — 85025 COMPLETE CBC W/AUTO DIFF WBC: CPT

## 2025-08-08 PROCEDURE — 80053 COMPREHEN METABOLIC PANEL: CPT

## 2025-08-08 PROCEDURE — 99285 EMERGENCY DEPT VISIT HI MDM: CPT

## 2025-08-08 PROCEDURE — 84484 ASSAY OF TROPONIN QUANT: CPT

## 2025-08-08 PROCEDURE — 93005 ELECTROCARDIOGRAM TRACING: CPT | Performed by: EMERGENCY MEDICINE

## 2025-08-08 RX ORDER — HYOSCYAMINE SULFATE 0.12 MG/1
0.12 TABLET ORAL EVERY 6 HOURS PRN
Qty: 24 TABLET | Refills: 0 | Status: SHIPPED | OUTPATIENT
Start: 2025-08-08

## 2025-08-08 RX ORDER — IOPAMIDOL 755 MG/ML
100 INJECTION, SOLUTION INTRAVASCULAR
Status: COMPLETED | OUTPATIENT
Start: 2025-08-08 | End: 2025-08-08

## 2025-08-08 RX ADMIN — IOPAMIDOL 100 ML: 755 INJECTION, SOLUTION INTRAVENOUS at 14:26

## 2025-08-08 ASSESSMENT — PAIN DESCRIPTION - ORIENTATION: ORIENTATION: MID

## 2025-08-08 ASSESSMENT — PAIN SCALES - GENERAL
PAINLEVEL_OUTOF10: 0
PAINLEVEL_OUTOF10: 6
PAINLEVEL_OUTOF10: 0

## 2025-08-08 ASSESSMENT — LIFESTYLE VARIABLES
HOW OFTEN DO YOU HAVE A DRINK CONTAINING ALCOHOL: 2-4 TIMES A MONTH
HOW MANY STANDARD DRINKS CONTAINING ALCOHOL DO YOU HAVE ON A TYPICAL DAY: 1 OR 2

## 2025-08-08 ASSESSMENT — PAIN - FUNCTIONAL ASSESSMENT
PAIN_FUNCTIONAL_ASSESSMENT: 0-10
PAIN_FUNCTIONAL_ASSESSMENT: ACTIVITIES ARE NOT PREVENTED

## 2025-08-08 ASSESSMENT — PAIN DESCRIPTION - LOCATION: LOCATION: CHEST

## 2025-08-08 ASSESSMENT — PAIN DESCRIPTION - ONSET: ONSET: ON-GOING

## 2025-08-08 ASSESSMENT — PAIN DESCRIPTION - FREQUENCY: FREQUENCY: CONTINUOUS

## 2025-08-08 ASSESSMENT — HEART SCORE: ECG: NORMAL

## 2025-08-08 ASSESSMENT — PAIN DESCRIPTION - DESCRIPTORS: DESCRIPTORS: ACHING

## 2025-08-08 ASSESSMENT — PAIN DESCRIPTION - PAIN TYPE: TYPE: ACUTE PAIN

## (undated) DEVICE — DEVON™ KNEE AND BODY STRAP 60" X 3" (1.5 M X 7.6 CM): Brand: DEVON

## (undated) DEVICE — SUTURE V-LOC 180 SZ 0 L12IN ABSRB GRN L37MM GS-21 1/2 CIR VLOCL0316

## (undated) DEVICE — BLADELESS OBTURATOR: Brand: WECK VISTA

## (undated) DEVICE — SURGICAL PROCEDURE KIT GEN LAPAROSCOPY LF

## (undated) DEVICE — COLUMN DRAPE

## (undated) DEVICE — GOWN,SIRUS,NONRNF,SETINSLV,2XL,18/CS: Brand: MEDLINE

## (undated) DEVICE — SOLUTION IRRIG 1000ML H2O STRL BLT

## (undated) DEVICE — (D)PREP SKN CHLRAPRP APPL 26ML -- CONVERT TO ITEM 371833

## (undated) DEVICE — SEAL UNIV 5-8MM DISP BX/10 -- DA VINCI XI - SNGL USE

## (undated) DEVICE — APPLICATOR BNDG 1MM ADH PREMIERPRO EXOFIN

## (undated) DEVICE — VISUALIZATION SYSTEM: Brand: CLEARIFY

## (undated) DEVICE — SUTURE VCRL SZ 4-0 L27IN ABSRB UD L19MM PS-2 3/8 CIR PRIM J426H

## (undated) DEVICE — COVER,MAYO STAND,STERILE: Brand: MEDLINE

## (undated) DEVICE — SUTURE V LOC 1 L18IN NONABSORBABLE GS-21 TAPERPOINT NDL VLOCN0327

## (undated) DEVICE — BINDER ABD M/L H12IN FOR 46-62IN WHT 4 SLD PNL DSGN HOOP

## (undated) DEVICE — REM POLYHESIVE ADULT PATIENT RETURN ELECTRODE: Brand: VALLEYLAB

## (undated) DEVICE — INFECTION CONTROL KIT SYS

## (undated) DEVICE — NEEDLE HYPO 25GA L1.5IN BVL ORIENTED ECLIPSE

## (undated) DEVICE — ARM DRAPE

## (undated) DEVICE — HANDLE LT SNAP ON ULT DURABLE LENS FOR TRUMPF ALC DISPOSABLE

## (undated) DEVICE — TOWEL SURG W17XL27IN STD BLU COT NONFENESTRATED PREWASHED

## (undated) DEVICE — SUTURE SZ 0 27IN 5/8 CIR UR-6  TAPER PT VIOLET ABSRB VICRYL J603H

## (undated) DEVICE — MEGA SUTURECUT ND: Brand: ENDOWRIST

## (undated) DEVICE — KENDALL SCD EXPRESS SLEEVES, KNEE LENGTH, MEDIUM: Brand: KENDALL SCD

## (undated) DEVICE — MEGA NEEDLE DRIVER: Brand: ENDOWRIST

## (undated) DEVICE — STERILE POLYISOPRENE POWDER-FREE SURGICAL GLOVES: Brand: PROTEXIS